# Patient Record
Sex: MALE | Race: BLACK OR AFRICAN AMERICAN | Employment: OTHER | URBAN - METROPOLITAN AREA
[De-identification: names, ages, dates, MRNs, and addresses within clinical notes are randomized per-mention and may not be internally consistent; named-entity substitution may affect disease eponyms.]

---

## 2019-09-06 DIAGNOSIS — D63.1 ANEMIA IN STAGE 4 CHRONIC KIDNEY DISEASE (HCC): ICD-10-CM

## 2019-09-06 DIAGNOSIS — N18.4 ANEMIA IN STAGE 4 CHRONIC KIDNEY DISEASE (HCC): ICD-10-CM

## 2019-09-06 DIAGNOSIS — N18.4 CHRONIC KIDNEY DISEASE, STAGE IV (SEVERE) (HCC): ICD-10-CM

## 2019-09-26 ENCOUNTER — HOSPITAL ENCOUNTER (OUTPATIENT)
Dept: INFUSION THERAPY | Age: 79
Setting detail: INFUSION SERIES
Discharge: HOME | End: 2019-09-26
Payer: MEDICARE

## 2019-09-26 VITALS
RESPIRATION RATE: 17 BRPM | OXYGEN SATURATION: 98 % | TEMPERATURE: 98.1 F | SYSTOLIC BLOOD PRESSURE: 168 MMHG | HEART RATE: 52 BPM | DIASTOLIC BLOOD PRESSURE: 62 MMHG

## 2019-09-26 DIAGNOSIS — D63.1 ANEMIA IN STAGE 4 CHRONIC KIDNEY DISEASE (HCC): Primary | ICD-10-CM

## 2019-09-26 DIAGNOSIS — N18.4 CHRONIC KIDNEY DISEASE, STAGE IV (SEVERE) (HCC): ICD-10-CM

## 2019-09-26 DIAGNOSIS — N18.4 ANEMIA IN STAGE 4 CHRONIC KIDNEY DISEASE (HCC): Primary | ICD-10-CM

## 2019-09-26 LAB
FERRITIN: 127.9 NG/ML (ref 30–400)
FOLATE: 11.01 NG/ML (ref 4.78–24.2)
HCT VFR BLD CALC: 21.9 % (ref 40.5–52.5)
HEMOGLOBIN: 7.1 G/DL (ref 13.5–17.5)
IRON SATURATION: 18 % (ref 20–50)
IRON: 56 UG/DL (ref 59–158)
MCH RBC QN AUTO: 32.2 PG (ref 26–34)
MCHC RBC AUTO-ENTMCNC: 32.6 G/DL (ref 31–36)
MCV RBC AUTO: 98.9 FL (ref 80–100)
PDW BLD-RTO: 15.3 % (ref 12.4–15.4)
PLATELET # BLD: 197 K/UL (ref 135–450)
PMV BLD AUTO: 8.1 FL (ref 5–10.5)
RBC # BLD: 2.21 M/UL (ref 4.2–5.9)
TOTAL IRON BINDING CAPACITY: 303 UG/DL (ref 260–445)
VITAMIN B-12: 353 PG/ML (ref 211–911)
WBC # BLD: 5.5 K/UL (ref 4–11)

## 2019-09-26 PROCEDURE — 6360000002 HC RX W HCPCS: Performed by: INTERNAL MEDICINE

## 2019-09-26 PROCEDURE — 83550 IRON BINDING TEST: CPT

## 2019-09-26 PROCEDURE — 96372 THER/PROPH/DIAG INJ SC/IM: CPT

## 2019-09-26 PROCEDURE — 83540 ASSAY OF IRON: CPT

## 2019-09-26 PROCEDURE — 82728 ASSAY OF FERRITIN: CPT

## 2019-09-26 PROCEDURE — 82746 ASSAY OF FOLIC ACID SERUM: CPT

## 2019-09-26 PROCEDURE — 82607 VITAMIN B-12: CPT

## 2019-09-26 PROCEDURE — 85027 COMPLETE CBC AUTOMATED: CPT

## 2019-09-26 RX ORDER — LOSARTAN POTASSIUM 50 MG/1
100 TABLET ORAL DAILY
COMMUNITY
End: 2019-12-20

## 2019-09-26 RX ORDER — AMLODIPINE BESYLATE 10 MG/1
10 TABLET ORAL EVERY EVENING
COMMUNITY
End: 2019-11-08

## 2019-09-26 RX ORDER — GLIPIZIDE 5 MG/1
5 TABLET ORAL
COMMUNITY
End: 2021-08-27

## 2019-09-26 RX ORDER — ALLOPURINOL 100 MG/1
100 TABLET ORAL DAILY
Status: ON HOLD | COMMUNITY
End: 2022-05-10 | Stop reason: HOSPADM

## 2019-09-26 RX ORDER — TAMSULOSIN HYDROCHLORIDE 0.4 MG/1
0.4 CAPSULE ORAL NIGHTLY
Status: ON HOLD | COMMUNITY
End: 2022-05-10 | Stop reason: HOSPADM

## 2019-09-26 RX ORDER — VITAMIN B COMPLEX
1 CAPSULE ORAL DAILY
COMMUNITY
End: 2019-11-08

## 2019-09-26 RX ADMIN — ERYTHROPOIETIN 20000 UNITS: 20000 INJECTION, SOLUTION INTRAVENOUS; SUBCUTANEOUS at 15:14

## 2019-09-26 SDOH — HEALTH STABILITY: MENTAL HEALTH: HOW OFTEN DO YOU HAVE A DRINK CONTAINING ALCOHOL?: 2-4 TIMES A MONTH

## 2019-09-26 NOTE — PROGRESS NOTES
Outpatient [address] [facility]    Peripheral Lab Draw    NAME:  Leeann Broussard  YOB: 1940  MEDICAL RECORD NUMBER:  [id number]  Episode Date:  9/26/2019    Blood Draw Site: Location:right arm  Site cleansed with Chloroprep Scrub for 30 seconds: Yes  Site cleansed with Alcohol pads: No:   Labs drawn with: 23 gauge             [x] Butterfly    [] Needle  Labs Obtained: Yes  Number of attempts: 1    Lab Test(s) Ordered: all of the initial labs incl. TIBC plus CBC were completed. Lab Draw Site:  Redness: No  Bruising: No   Edema: No  Pain: No     Response to treatment:  Well tolerated by patient.       Electronically signed by Jarett Vu RN on 9/26/2019 at 6:45 PM

## 2019-09-26 NOTE — PROGRESS NOTES
Outpatient [address] [facility]     Epogen Visit    NAME:  Fernando Ormond OF BIRTH:  1940  MEDICAL RECORD NUMBER:  [id number]  Episode Date:  9/26/2019            Pt here for the very first injection of procrit 20,000 units per Dr. Anaya Tolbert orders. Side effect sheet from [facility] printed and reviewed before the first injection was given. Here alone today. B/P elevated x 2 then it came down to 168/62. A lot of teaching done so pt is aware that he definetly needs that med before he comes to this appt. He said that his glucose this am was 148. Past history of DM for 28 yrs. history of prostate cancer and gets Lupron injections for that in [address] [place] [address] at a Urology group. The last Lupron was given to him 3 days ago. Patient arrived to [facility]   [] per wheelchair   [x] ambulatory     Is the patient experiencing any:  Fatigue:   []   None  []   Increase over baseline but not altering normal activities  []   Moderate of causing difficulty performing some activities  [x]   Severe or loss of ability to perform some activities  []   Bedridden or disabling    Dizziness or Lightheadedness:   [x]   None  []   No Interference  []   Interferes with functioning but not activities of daily living  []   Interferes with daily activies  []   Bedridden or disabling    Shortness of Breath:   []   None   [x]   Dyspneic on exertion   []   Dyspnea with normal activities  []   Dyspnea at rest    Edema: none    Tachycardia: NO    Heart Palpitations:  NO      Chest Pain: NO     BP (!) 172/55   Pulse 52   Temp 98.1 °F (36.7 °C) (Oral)   Resp 17   SpO2 98%     Hold ARANZA dose if B/P is greater than: 180 mm/Hg / took med before coming in for appt. But it still had to be checked x 3 before it was < 080 systolic. If Hgb remains less than 10 Increase dose by 25%. Do not increase dose more frequently than once every 4 weeks.   If Hgb 10-10.5 g/dl  Continue same dose  If Hgb 10.6-11 g/dl Decrease dose by 25%. A decrease in dose can be done as frequently as every week. If Hgb is greater than 11 g/dl Hold Epogen. May resume ARANZA when Hgb is less than 10 with a 25% reduction in the dose from the last administered dose or decrease with a 25% reduction in the dose from the last administered dose or decrease  frequency. Last visit date:  Not applicable      Current Lab Data:    Recent Labs     09/26/19  1420   WBC 5.5   HGB 7.1*   HCT 21.9*   MCV 98.9        Dose will be given as ordered at 20,000 units per MD orders. Epogen dosage: 20,000 units was administered slowly subcutaneously into the RIGHT upper arm. Dose verified by: Severiano Tony RN    Response to treatment:  Well tolerated by patient. Education:    Needs reinforcement    Scheduled to return for next dose of Epogen on October 10, 2019 .   No c/o's of pain with this injection of EPO. today   Electronically signed by Katie Lira RN on 9/26/2019 at 6:41 PM

## 2019-10-03 ENCOUNTER — APPOINTMENT (OUTPATIENT)
Dept: INFUSION THERAPY | Age: 79
End: 2019-10-03
Payer: MEDICARE

## 2019-10-10 ENCOUNTER — HOSPITAL ENCOUNTER (OUTPATIENT)
Dept: INFUSION THERAPY | Age: 79
Setting detail: INFUSION SERIES
Discharge: HOME | End: 2019-10-10
Payer: MEDICARE

## 2019-10-10 VITALS
HEART RATE: 77 BPM | RESPIRATION RATE: 18 BRPM | TEMPERATURE: 97.7 F | SYSTOLIC BLOOD PRESSURE: 143 MMHG | OXYGEN SATURATION: 94 % | DIASTOLIC BLOOD PRESSURE: 65 MMHG

## 2019-10-10 DIAGNOSIS — N18.4 CHRONIC KIDNEY DISEASE, STAGE IV (SEVERE) (HCC): ICD-10-CM

## 2019-10-10 DIAGNOSIS — N18.4 ANEMIA IN STAGE 4 CHRONIC KIDNEY DISEASE (HCC): Primary | ICD-10-CM

## 2019-10-10 DIAGNOSIS — D63.1 ANEMIA IN STAGE 4 CHRONIC KIDNEY DISEASE (HCC): Primary | ICD-10-CM

## 2019-10-10 LAB — HEMOGLOBIN: 8 G/DL (ref 13.5–17.5)

## 2019-10-10 PROCEDURE — 96372 THER/PROPH/DIAG INJ SC/IM: CPT

## 2019-10-10 PROCEDURE — 85018 HEMOGLOBIN: CPT

## 2019-10-10 PROCEDURE — 6360000002 HC RX W HCPCS: Performed by: INTERNAL MEDICINE

## 2019-10-10 RX ORDER — SODIUM CHLORIDE 0.9 % (FLUSH) 0.9 %
10 SYRINGE (ML) INJECTION PRN
Status: CANCELLED | OUTPATIENT
Start: 2019-10-24

## 2019-10-10 RX ORDER — METHYLPREDNISOLONE SODIUM SUCCINATE 125 MG/2ML
125 INJECTION, POWDER, LYOPHILIZED, FOR SOLUTION INTRAMUSCULAR; INTRAVENOUS ONCE
Status: CANCELLED | OUTPATIENT
Start: 2019-10-24

## 2019-10-10 RX ORDER — DIPHENHYDRAMINE HYDROCHLORIDE 50 MG/ML
50 INJECTION INTRAMUSCULAR; INTRAVENOUS ONCE
Status: CANCELLED | OUTPATIENT
Start: 2019-10-24

## 2019-10-10 RX ORDER — EPINEPHRINE 1 MG/ML
0.3 INJECTION, SOLUTION, CONCENTRATE INTRAVENOUS PRN
Status: CANCELLED | OUTPATIENT
Start: 2019-10-24

## 2019-10-10 RX ORDER — MEPERIDINE HYDROCHLORIDE 25 MG/ML
12.5 INJECTION INTRAMUSCULAR; INTRAVENOUS; SUBCUTANEOUS ONCE
Status: CANCELLED | OUTPATIENT
Start: 2019-10-24

## 2019-10-10 RX ORDER — SODIUM CHLORIDE 9 MG/ML
INJECTION, SOLUTION INTRAVENOUS CONTINUOUS
Status: CANCELLED | OUTPATIENT
Start: 2019-10-24

## 2019-10-10 RX ADMIN — EPOETIN ALFA-EPBX 20000 UNITS: 10000 INJECTION, SOLUTION INTRAVENOUS; SUBCUTANEOUS at 14:20

## 2019-10-10 NOTE — PROGRESS NOTES
Outpatient [address] [facility]    Peripheral Lab Draw    NAME:  Lilli Maxwell OF BIRTH:  1940  MEDICAL RECORD NUMBER:  [id number]  Episode Date:  10/10/2019    Blood Draw Site: Location:right arm  Site cleansed with Chloroprep Scrub for 30 seconds: Yes  Site cleansed with Alcohol pads: Yes  Labs drawn with: 23 gauge             [x] Butterfly    [] Needle  Labs Obtained: Yes  Number of attempts: 1    Lab Test(s) Ordered: HGB    Lab Draw Site:  Redness: No  Bruising: No   Edema: No  Pain: No     Response to treatment:  Well tolerated by patient. Electronically signed by Roxanne Zavala RN on 10/10/2019 at 1:50 PM      Outpatient [address] [facility]     Epogen Visit    NAME:  Lilli Maxwell OF BIRTH:  1940  MEDICAL RECORD NUMBER:  [id number]  Episode Date:  10/10/2019    Patient arrived to [facility]   [] per wheelchair   [x] ambulatory    Patient just saw Dr. Michael Jones couple of days ago. Kidney function has been worsening. He has 2 sisters with CKD and one of them is getting ready to have a transplant. Dr. Michael Jones told him to save his left arm. Iron levels a little low: iron sat was 18, iron level 56 and ferritin 127.9. Dr. Michael Jones has ordered Venofer 300 mg X 3 doses. Awaiting insurance verification. Explained to patient he would be getting iron infusion starting at next visit.      Is the patient experiencing any:  Fatigue:   []   None  [x]   Increase over baseline but not altering normal activities , states he tires easily  []   Moderate of causing difficulty performing some activities  []   Severe or loss of ability to perform some activities  []   Bedridden or disabling    Dizziness or Lightheadedness: Patient states he had some dizziness last week when he was taking Labatolol so med was stopped and he  has not had any dizziness since then.  []   None  [x]   No Interference  []   Interferes with functioning but not activities of daily living  []   Interferes with daily activies  []   Bedridden or disabling    Shortness of Breath: Denies any  [x]   None   []   Dyspneic on exertion   []   Dyspnea with normal activities  []   Dyspnea at rest    Edema: none Location of edema: nothing    Tachycardia: No    Heart Palpitations: No      Chest Pain: No     BP (!) 143/65   Pulse 77   Temp 97.7 °F (36.5 °C) (Oral)   Resp 18   SpO2 94%     Hold ARANZA dose if B/P is greater than: 180 mm/Hg     If Hgb remains less than 10 Increase dose by 25%. Do not increase dose more frequently than once every 4 weeks. If Hgb 10-10.5 g/dl  Continue same dose  If Hgb 10.6-11 g/dl Decrease dose by 25%. A decrease in dose can be done as frequently as every week. If Hgb is greater than 11 g/dl Hold Epogen. May resume ARANZA when Hgb is less than 10 with a 25% reduction in the dose from the last administered dose or decrease with a 25% reduction in the dose from the last administered dose or decrease  frequency. Last visit date: 9/26/19  Last Hgb: 7.1 g/dl   Last dose: 20,000 units     Current Lab Data:    Recent Labs     10/10/19  1325   HGB 8.0*     Dose will be the same    Epogen dosage: 20,000 units was administered slowly subcutaneously into the right upper arm. Dose verified by: Buster Paul RN    Response to treatment:  Well tolerated by patient. Education:    Indicates understanding    Scheduled to return for next dose of Epogen on October 24, 2019 and he will also get Venofer 300 mg at that time.      Electronically signed by Rey Little RN on 10/10/2019 at 6:30 pm.

## 2019-10-24 ENCOUNTER — HOSPITAL ENCOUNTER (OUTPATIENT)
Dept: INFUSION THERAPY | Age: 79
Setting detail: INFUSION SERIES
Discharge: HOME | End: 2019-10-24
Payer: MEDICARE

## 2019-10-24 VITALS
TEMPERATURE: 98.2 F | RESPIRATION RATE: 18 BRPM | DIASTOLIC BLOOD PRESSURE: 70 MMHG | HEART RATE: 58 BPM | SYSTOLIC BLOOD PRESSURE: 142 MMHG | OXYGEN SATURATION: 96 %

## 2019-10-24 DIAGNOSIS — N18.4 ANEMIA IN STAGE 4 CHRONIC KIDNEY DISEASE (HCC): Primary | ICD-10-CM

## 2019-10-24 DIAGNOSIS — D63.1 ANEMIA IN STAGE 4 CHRONIC KIDNEY DISEASE (HCC): Primary | ICD-10-CM

## 2019-10-24 DIAGNOSIS — N18.4 CHRONIC KIDNEY DISEASE, STAGE IV (SEVERE) (HCC): ICD-10-CM

## 2019-10-24 LAB — HEMOGLOBIN: 8.6 G/DL (ref 13.5–17.5)

## 2019-10-24 PROCEDURE — 96366 THER/PROPH/DIAG IV INF ADDON: CPT

## 2019-10-24 PROCEDURE — 96365 THER/PROPH/DIAG IV INF INIT: CPT

## 2019-10-24 PROCEDURE — 96372 THER/PROPH/DIAG INJ SC/IM: CPT

## 2019-10-24 PROCEDURE — 2580000003 HC RX 258: Performed by: INTERNAL MEDICINE

## 2019-10-24 PROCEDURE — 85018 HEMOGLOBIN: CPT

## 2019-10-24 PROCEDURE — 6360000002 HC RX W HCPCS: Performed by: INTERNAL MEDICINE

## 2019-10-24 RX ORDER — SODIUM CHLORIDE 9 MG/ML
INJECTION, SOLUTION INTRAVENOUS CONTINUOUS
Status: CANCELLED | OUTPATIENT
Start: 2019-11-07

## 2019-10-24 RX ORDER — SODIUM CHLORIDE 0.9 % (FLUSH) 0.9 %
10 SYRINGE (ML) INJECTION PRN
Status: CANCELLED | OUTPATIENT
Start: 2019-11-07

## 2019-10-24 RX ORDER — SODIUM CHLORIDE 0.9 % (FLUSH) 0.9 %
10 SYRINGE (ML) INJECTION PRN
Status: DISCONTINUED | OUTPATIENT
Start: 2019-10-24 | End: 2019-10-25 | Stop reason: HOSPADM

## 2019-10-24 RX ORDER — MEPERIDINE HYDROCHLORIDE 25 MG/ML
12.5 INJECTION INTRAMUSCULAR; INTRAVENOUS; SUBCUTANEOUS ONCE
Status: CANCELLED | OUTPATIENT
Start: 2019-11-07

## 2019-10-24 RX ORDER — EPINEPHRINE 1 MG/ML
0.3 INJECTION, SOLUTION, CONCENTRATE INTRAVENOUS PRN
Status: CANCELLED | OUTPATIENT
Start: 2019-11-07

## 2019-10-24 RX ORDER — ERGOCALCIFEROL (VITAMIN D2) 1250 MCG
50000 CAPSULE ORAL WEEKLY
COMMUNITY
End: 2020-06-19

## 2019-10-24 RX ORDER — DIPHENHYDRAMINE HYDROCHLORIDE 50 MG/ML
50 INJECTION INTRAMUSCULAR; INTRAVENOUS ONCE
Status: CANCELLED | OUTPATIENT
Start: 2019-11-07

## 2019-10-24 RX ORDER — METHYLPREDNISOLONE SODIUM SUCCINATE 125 MG/2ML
125 INJECTION, POWDER, LYOPHILIZED, FOR SOLUTION INTRAMUSCULAR; INTRAVENOUS ONCE
Status: CANCELLED | OUTPATIENT
Start: 2019-11-07

## 2019-10-24 RX ADMIN — IRON SUCROSE 300 MG: 20 INJECTION, SOLUTION INTRAVENOUS at 14:00

## 2019-10-24 RX ADMIN — Medication 10 ML: at 14:00

## 2019-10-24 RX ADMIN — Medication 10 ML: at 15:48

## 2019-10-24 RX ADMIN — Medication 10 ML: at 15:56

## 2019-10-24 RX ADMIN — EPOETIN ALFA-EPBX 25000 UNITS: 40000 INJECTION, SOLUTION INTRAVENOUS; SUBCUTANEOUS at 16:05

## 2019-10-24 ASSESSMENT — PAIN SCALES - GENERAL
PAINLEVEL_OUTOF10: 0

## 2019-10-24 NOTE — PROGRESS NOTES
[address] [facility] with lab draw and Venofer Visit     NAME:  Ermias Miller OF BIRTH:  1940  MEDICAL RECORD NUMBER:  [id number]  Episode Date:  10/24/2019    Patient arrived to [place] [address]   [] per wheelchair   [x] ambulatory     Peripheral Lab Draw    Blood Draw Site: Location: right anterior lateral proximal forearm  Site cleansed with Chloroprep Scrub for 30 seconds: Yes  Labs drawn with: 22 gauge angiocath with IV start       [] Butterfly    [] Needle  Labs Obtained: Yes  Number of attempts: 1    Lab Test(s) Ordered: STAT Hemoglobin    Lab Draw Site:  Redness: No  Bruising: No   Edema: No  Pain: No     Response to treatment:  Well tolerated by patient. Venofer Visit     Has the patient had a previous problem with Venofer Infusion? No  Any current infection or illness? No   Patient on antibiotics? No   History of Hypertension? Yes - took antihypertensive medications as prescribed     BP (!) 142/70   Pulse 58   Temp 98.2 °F (36.8 °C) (Oral)   Resp 18   SpO2 96%   No data found. Is the patient experiencing any:  Fatigue:   []   None  [x]   Increase over baseline but not altering normal activities - Patient reports that he gets tired more easily and needs to take rest/naps during the day.   []   Moderate of causing difficulty performing some activities  []   Severe or loss of ability to perform some activities  []   Bedridden or disabling     Dizziness or Lightheadedness:   []   None  [x]   No Interference - only with quick position changes and states that he knows to stand up slowly  []   Interferes with functioning but not activities of daily living  []   Interferes with daily activies  []   Bedridden or disabling    Shortness of Breath:   [x]   None   []   Dyspneic on exertion   []   Dyspnea with normal activities  []   Dyspnea at rest    Edema: Trace amount of edema noted in bilateral ankles     Tachycardia: No    Heart by:  Thania Albrecht RN    Response to treatment:  Well tolerated by patient. Education:    Verbalized understanding. Verbal and written discharge instructions reviewed with patient and he verbalized understanding. Written copy given via AVS. Requested for patient to bring copy of Advance Directives and pill bottles to next appointment    Scheduled to return for next dose of Epogen on November 8, 2019 .      Electronically signed by Vargas Teixeira RN on 10/24/2019 at 6:47 PM

## 2019-11-08 ENCOUNTER — HOSPITAL ENCOUNTER (OUTPATIENT)
Dept: INFUSION THERAPY | Age: 79
Setting detail: INFUSION SERIES
Discharge: HOME | End: 2019-11-08
Payer: MEDICARE

## 2019-11-08 VITALS
OXYGEN SATURATION: 98 % | RESPIRATION RATE: 17 BRPM | SYSTOLIC BLOOD PRESSURE: 116 MMHG | DIASTOLIC BLOOD PRESSURE: 73 MMHG | HEART RATE: 81 BPM | TEMPERATURE: 97.9 F

## 2019-11-08 DIAGNOSIS — D63.1 ANEMIA IN STAGE 4 CHRONIC KIDNEY DISEASE (HCC): Primary | ICD-10-CM

## 2019-11-08 DIAGNOSIS — N18.4 CHRONIC KIDNEY DISEASE, STAGE IV (SEVERE) (HCC): ICD-10-CM

## 2019-11-08 DIAGNOSIS — N18.4 ANEMIA IN STAGE 4 CHRONIC KIDNEY DISEASE (HCC): Primary | ICD-10-CM

## 2019-11-08 LAB — HEMOGLOBIN: 8.6 G/DL (ref 13.5–17.5)

## 2019-11-08 PROCEDURE — 6360000002 HC RX W HCPCS: Performed by: INTERNAL MEDICINE

## 2019-11-08 PROCEDURE — 2580000003 HC RX 258: Performed by: INTERNAL MEDICINE

## 2019-11-08 PROCEDURE — 85018 HEMOGLOBIN: CPT

## 2019-11-08 PROCEDURE — 96372 THER/PROPH/DIAG INJ SC/IM: CPT

## 2019-11-08 PROCEDURE — 96365 THER/PROPH/DIAG IV INF INIT: CPT

## 2019-11-08 PROCEDURE — 96366 THER/PROPH/DIAG IV INF ADDON: CPT

## 2019-11-08 RX ORDER — FUROSEMIDE 40 MG/1
80 TABLET ORAL 2 TIMES DAILY
Status: ON HOLD | COMMUNITY
End: 2022-05-10 | Stop reason: HOSPADM

## 2019-11-08 RX ORDER — METHYLPREDNISOLONE SODIUM SUCCINATE 125 MG/2ML
125 INJECTION, POWDER, LYOPHILIZED, FOR SOLUTION INTRAMUSCULAR; INTRAVENOUS ONCE
Status: CANCELLED | OUTPATIENT
Start: 2019-11-21

## 2019-11-08 RX ORDER — SODIUM CHLORIDE 0.9 % (FLUSH) 0.9 %
10 SYRINGE (ML) INJECTION PRN
Status: DISCONTINUED | OUTPATIENT
Start: 2019-11-08 | End: 2019-11-09 | Stop reason: HOSPADM

## 2019-11-08 RX ORDER — DIPHENHYDRAMINE HYDROCHLORIDE 50 MG/ML
50 INJECTION INTRAMUSCULAR; INTRAVENOUS ONCE
Status: CANCELLED | OUTPATIENT
Start: 2019-11-21

## 2019-11-08 RX ORDER — EPINEPHRINE 1 MG/ML
0.3 INJECTION, SOLUTION, CONCENTRATE INTRAVENOUS PRN
Status: CANCELLED | OUTPATIENT
Start: 2019-11-21

## 2019-11-08 RX ORDER — HYDRALAZINE HYDROCHLORIDE 50 MG/1
25 TABLET, FILM COATED ORAL 2 TIMES DAILY
Status: ON HOLD | COMMUNITY
End: 2022-04-21 | Stop reason: HOSPADM

## 2019-11-08 RX ORDER — SODIUM CHLORIDE 0.9 % (FLUSH) 0.9 %
10 SYRINGE (ML) INJECTION PRN
Status: CANCELLED | OUTPATIENT
Start: 2019-11-21

## 2019-11-08 RX ORDER — MEPERIDINE HYDROCHLORIDE 25 MG/ML
12.5 INJECTION INTRAMUSCULAR; INTRAVENOUS; SUBCUTANEOUS ONCE
Status: CANCELLED | OUTPATIENT
Start: 2019-11-21

## 2019-11-08 RX ORDER — SODIUM CHLORIDE 9 MG/ML
INJECTION, SOLUTION INTRAVENOUS CONTINUOUS
Status: CANCELLED | OUTPATIENT
Start: 2019-11-21

## 2019-11-08 RX ORDER — NIFEDIPINE 60 MG/1
60 TABLET, EXTENDED RELEASE ORAL 2 TIMES DAILY
Status: ON HOLD | COMMUNITY
End: 2022-04-21 | Stop reason: HOSPADM

## 2019-11-08 RX ADMIN — IRON SUCROSE 300 MG: 20 INJECTION, SOLUTION INTRAVENOUS at 13:00

## 2019-11-08 RX ADMIN — EPOETIN ALFA-EPBX 25000 UNITS: 10000 INJECTION, SOLUTION INTRAVENOUS; SUBCUTANEOUS at 13:46

## 2019-11-08 RX ADMIN — Medication 10 ML: at 15:06

## 2019-11-08 ASSESSMENT — PAIN SCALES - GENERAL: PAINLEVEL_OUTOF10: 0

## 2019-11-08 NOTE — PROGRESS NOTES
[address] [facility]     Epogen Visit with Peripheral Lab Draw    NAME:  Kathy Gary OF BIRTH:  1940  MEDICAL RECORD NUMBER:  [id number]  Episode Date:  11/8/2019    Patient arrived to [facility]   [] per wheelchair   [x] ambulatory    Peripheral Lab Draw    Blood Draw Site: Location:right arm  Site cleansed with Chloroprep Scrub for 30 seconds: Yes  Site cleansed with Alcohol pads: Yes  Labs drawn with: 22 gauge             [] Butterfly    [x] Needle  Labs Obtained: Yes  Number of attempts: 2    Lab Test(s) Ordered: HGB    Lab Draw Site:  Redness: No  Bruising: No   Edema: No  Pain: No       Epogen Visit     Is the patient experiencing any:  Fatigue:   []   None  [x]   Increase over baseline but not altering normal activities  []   Moderate of causing difficulty performing some activities  []   Severe or loss of ability to perform some activities  []   Bedridden or disabling    Dizziness or Lightheadedness:   [x]   None  []   No Interference  []   Interferes with functioning but not activities of daily living  []   Interferes with daily activies  []   Bedridden or disabling    Shortness of Breath:   []   None   [x]   Dyspneic on exertion only with steps  []   Dyspnea with normal activities  []   Dyspnea at rest    Edema: none Location of edema: Tachycardia: No    Heart Palpitations: No      Chest Pain: No     BP (!) 150/67   Pulse 96   Temp 97.9 °F (36.6 °C) (Oral)   Resp 17   SpO2 98%     Hold ARANZA dose if B/P is greater than: 180 mm/Hg     If Hgb remains less than 10 Increase dose by 25%. Do not increase dose more frequently than once every 4 weeks. If Hgb 10-10.5 g/dl  Continue same dose  If Hgb 10.6-11 g/dl Decrease dose by 25%. A decrease in dose can be done as frequently as every week. If Hgb is greater than 11 g/dl Hold Epogen.   May resume ARANZA when Hgb is less than 10 with a 25% reduction in the dose from the last administered dose or decrease with a 25% reduction in the dose from the last administered dose or decrease  frequency. Last visit date: 10/24/19     Last Hgb: 8.6 g/dl   Last dose: 64778 units    Current Lab Data:    Recent Labs     11/08/19  1255   HGB 8.6*     Dose will be the same    Epogen dosage: 43408 units was administered slowly subcutaneously into the right upper arm. Dose verified by:  Addie Martinez RN    Response to treatment:  Well tolerated by patient. Education:    Verbalized understanding    Scheduled to return for next dose of Epogen on November 22, 2019 . Electronically signed by Alejandro Samuels RN on 11/8/2019 at [address] [place] [address] Visit    NAME:  Erick Wu OF BIRTH:  1940  MEDICAL RECORD NUMBER:  [id number]  Episode Date:  11/8/2019    Patient arrived to [facility] 58   [] per wheelchair   [x] ambulatory     Has the patient had a previous problem with Venofer Infusion? No  Any current infection or illness? No   Patient on antibiotics? No   History of Hypertension?  Yes      BP (!) 150/67   Pulse 96   Temp 97.9 °F (36.6 °C) (Oral)   Resp 17   SpO2 98%   Patient Vitals for the past 2 hrs:   BP Temp Temp src Pulse Resp SpO2   11/08/19 1245 (!) 150/67 97.9 °F (36.6 °C) Oral 96 17 98 %       Is the patient experiencing any:  Fatigue:   []   None  [x]   Increase over baseline but not altering normal activities  []   Moderate of causing difficulty performing some activities  []   Severe or loss of ability to perform some activities  []   Bedridden or disabling     Dizziness or Lightheadedness:   [x]   None  []   No Interference  []   Interferes with functioning but not activities of daily living  []   Interferes with daily activies  []   Bedridden or disabling    Shortness of Breath:   []   None   [x]   Dyspneic on exertion only with steps  []   Dyspnea with normal activities  []   Dyspnea at rest    Edema: none     Tachycardia: No    Heart Palpitations: No      Chest Pain: No      BP (!) 150/67   Pulse 96   Temp 97.9 °F (36.6 °C) (Oral)   Resp 17   SpO2 98%   Patient Vitals for the past 2 hrs:   BP Temp Temp src Pulse Resp SpO2   11/08/19 1245 (!) 150/67 97.9 °F (36.6 °C) Oral 96 17 98 %       Current Lab Data:  Hemoglobin/Hematocrit:    Lab Results   Component Value Date    HGB 8.6 11/08/2019    HCT 21.9 09/26/2019     IRON:    Lab Results   Component Value Date    IRON 56 09/26/2019     Iron Saturation:    Lab Results   Component Value Date    LABIRON 18 09/26/2019     TIBC:    Lab Results   Component Value Date    TIBC 303 09/26/2019     FERRITIN:    Lab Results   Component Value Date    FERRITIN 127.9 09/26/2019       Dose Administered: 300 mg  Todays dose is number 2 out of 5 ordered for this patient. Response to treatment:  Well tolerated by patient. Education:    Verbalized understanding     Scheduled to return for next dose of Venofer on November 22, 2019.      Electronically signed by Denny Lawson RN on 11/8/2019 at 1:52 PM

## 2019-11-22 ENCOUNTER — HOSPITAL ENCOUNTER (OUTPATIENT)
Dept: INFUSION THERAPY | Age: 79
Setting detail: INFUSION SERIES
Discharge: HOME | End: 2019-11-22
Payer: MEDICARE

## 2019-11-22 VITALS
RESPIRATION RATE: 16 BRPM | OXYGEN SATURATION: 98 % | TEMPERATURE: 97.8 F | DIASTOLIC BLOOD PRESSURE: 73 MMHG | SYSTOLIC BLOOD PRESSURE: 161 MMHG | HEART RATE: 85 BPM

## 2019-11-22 DIAGNOSIS — N18.4 CHRONIC KIDNEY DISEASE, STAGE IV (SEVERE) (HCC): ICD-10-CM

## 2019-11-22 DIAGNOSIS — D63.1 ANEMIA IN STAGE 4 CHRONIC KIDNEY DISEASE (HCC): Primary | ICD-10-CM

## 2019-11-22 DIAGNOSIS — N18.4 ANEMIA IN STAGE 4 CHRONIC KIDNEY DISEASE (HCC): Primary | ICD-10-CM

## 2019-11-22 LAB — HEMOGLOBIN: 9.8 G/DL (ref 13.5–17.5)

## 2019-11-22 PROCEDURE — 2580000003 HC RX 258: Performed by: INTERNAL MEDICINE

## 2019-11-22 PROCEDURE — 85018 HEMOGLOBIN: CPT

## 2019-11-22 PROCEDURE — 96365 THER/PROPH/DIAG IV INF INIT: CPT

## 2019-11-22 PROCEDURE — 6360000002 HC RX W HCPCS: Performed by: INTERNAL MEDICINE

## 2019-11-22 PROCEDURE — 96372 THER/PROPH/DIAG INJ SC/IM: CPT

## 2019-11-22 PROCEDURE — 96366 THER/PROPH/DIAG IV INF ADDON: CPT

## 2019-11-22 RX ORDER — SODIUM CHLORIDE 0.9 % (FLUSH) 0.9 %
10 SYRINGE (ML) INJECTION PRN
Status: CANCELLED | OUTPATIENT
Start: 2019-11-22

## 2019-11-22 RX ORDER — SODIUM CHLORIDE 0.9 % (FLUSH) 0.9 %
10 SYRINGE (ML) INJECTION PRN
Status: DISCONTINUED | OUTPATIENT
Start: 2019-11-22 | End: 2019-11-23 | Stop reason: HOSPADM

## 2019-11-22 RX ORDER — METHYLPREDNISOLONE SODIUM SUCCINATE 125 MG/2ML
125 INJECTION, POWDER, LYOPHILIZED, FOR SOLUTION INTRAMUSCULAR; INTRAVENOUS ONCE
Status: CANCELLED | OUTPATIENT
Start: 2019-11-22

## 2019-11-22 RX ORDER — MEPERIDINE HYDROCHLORIDE 25 MG/ML
12.5 INJECTION INTRAMUSCULAR; INTRAVENOUS; SUBCUTANEOUS ONCE
Status: CANCELLED | OUTPATIENT
Start: 2019-11-22

## 2019-11-22 RX ORDER — SODIUM CHLORIDE 9 MG/ML
INJECTION, SOLUTION INTRAVENOUS CONTINUOUS
Status: CANCELLED | OUTPATIENT
Start: 2019-11-22

## 2019-11-22 RX ORDER — EPINEPHRINE 1 MG/ML
0.3 INJECTION, SOLUTION, CONCENTRATE INTRAVENOUS PRN
Status: CANCELLED | OUTPATIENT
Start: 2019-11-22

## 2019-11-22 RX ORDER — DIPHENHYDRAMINE HYDROCHLORIDE 50 MG/ML
50 INJECTION INTRAMUSCULAR; INTRAVENOUS ONCE
Status: CANCELLED | OUTPATIENT
Start: 2019-11-22

## 2019-11-22 RX ADMIN — EPOETIN ALFA-EPBX 31000 UNITS: 40000 INJECTION, SOLUTION INTRAVENOUS; SUBCUTANEOUS at 14:32

## 2019-11-22 RX ADMIN — IRON SUCROSE 300 MG: 20 INJECTION, SOLUTION INTRAVENOUS at 13:11

## 2019-11-22 RX ADMIN — Medication 30 ML: at 12:50

## 2019-11-22 RX ADMIN — Medication 10 ML: at 13:08

## 2019-11-22 NOTE — PROGRESS NOTES
[address] [facility]     Epogen Visit with Peripheral Lab Draw    NAME:  Cristina Marquisor OF BIRTH:  1940  MEDICAL RECORD NUMBER:  [id number]  Episode Date:  11/22/2019    Patient arrived to [facility]   [] per wheelchair   [x] ambulatory    Alert and oriented X4, here today for lab draw, epogen (retacrit) injection and last dose of venofer. Denies any side effects from last venofer infusion    Peripheral Lab Draw    Blood Draw Site: Location:right arm  Site cleansed with Chloroprep Scrub for 30 seconds: Yes  Site cleansed with Alcohol pads: Yes  Labs drawn with: 22 gauge  Angiocath           [] Butterfly    [] Needle  Labs Obtained: Yes  Number of attempts: 1    Lab Test(s) Ordered: Stat HGB    Lab Draw Site:  Redness: No  Bruising: No   Edema: No  Pain: No       Epogen Visit     Is the patient experiencing any:  Fatigue:   []   None  [x]   Increase over baseline but not altering normal activities  []   Moderate of causing difficulty performing some activities  []   Severe or loss of ability to perform some activities  []   Bedridden or disabling    Dizziness or Lightheadedness:   [x]   None  []   No Interference  []   Interferes with functioning but not activities of daily living  []   Interferes with daily activies  []   Bedridden or disabling    Shortness of Breath:   [x]   None   []   Dyspneic on exertion   []   Dyspnea with normal activities  []   Dyspnea at rest    Edema: none Location of edema: nothing    Tachycardia: No    Heart Palpitations: No      Chest Pain: No     BP (!) 150/72   Pulse 83   Temp 97.8 °F (36.6 °C) (Oral)   Resp 16   SpO2 98%     Hold ARANZA dose if B/P is greater than: 180 mm/Hg     If Hgb remains less than 10 Increase dose by 25%. Do not increase dose more frequently than once every 4 weeks. If Hgb 10-10.5 g/dl  Continue same dose  If Hgb 10.6-11 g/dl Decrease dose by 25%.   A decrease in dose can be done as frequently as every week. If Hgb is greater than 11 g/dl Hold Epogen. May resume ARANZA when Hgb is less than 10 with a 25% reduction in the dose from the last administered dose or decrease with a 25% reduction in the dose from the last administered dose or decrease  frequency. Last visit date: 11/8/19     Last Hgb: 8.6 g/dl   Last dose: 25,000 units    Current Lab Data:    Recent Labs     11/22/19  1250   HGB 9.8*     Dose will be increased, last increase 4 weeks and 1 day ago    Epogen dosage: 83028 units was administered slowly subcutaneously into the right upper arm. Dose verified by: AUBREY Freitas RN    Response to treatment:  Well tolerated by patient. Education:    Verbalized understanding    Scheduled to return for next dose of Epogen on December 6, 2019 . Electronically signed by Alto Gilford, RN on 11/22/2019 at 1:23 PM     Venofer Visit    NAME:  Ramona Sawyer OF BIRTH:  1940  MEDICAL RECORD NUMBER:  [id number]  Episode Date:  11/22/2019      Has the patient had a previous problem with Venofer Infusion? No  Any current infection or illness? No   Patient on antibiotics? No   History of Hypertension?  No     BP (!) 150/72   Pulse 83   Temp 97.8 °F (36.6 °C) (Oral)   Resp 16   SpO2 98%   Patient Vitals for the past 2 hrs:   BP Temp Temp src Pulse Resp SpO2   11/22/19 1230 (!) 150/72 97.8 °F (36.6 °C) Oral 83 16 98 %       Is the patient experiencing any:  Fatigue:   []   None  [x]   Increase over baseline but not altering normal activities  []   Moderate of causing difficulty performing some activities  []   Severe or loss of ability to perform some activities  []   Bedridden or disabling     Dizziness or Lightheadedness:   [x]   None  []   No Interference  []   Interferes with functioning but not activities of daily living  []   Interferes with daily activies  []   Bedridden or disabling    Shortness of Breath:   [x]   None   []   Dyspneic on exertion   []   Dyspnea with normal activities  []   Dyspnea at rest    Edema: none Location of edema: nothing    Tachycardia: No    Heart Palpitations: No      Chest Pain: No      BP (!) 150/72   Pulse 83   Temp 97.8 °F (36.6 °C) (Oral)   Resp 16   SpO2 98%   Patient Vitals for the past 2 hrs:   BP Temp Temp src Pulse Resp SpO2   11/22/19 1230 (!) 150/72 97.8 °F (36.6 °C) Oral 83 16 98 %       Current Lab Data:  Hemoglobin/Hematocrit:    Lab Results   Component Value Date    HGB 9.8 11/22/2019    HCT 21.9 09/26/2019     IRON:    Lab Results   Component Value Date    IRON 56 09/26/2019     Iron Saturation:    Lab Results   Component Value Date    LABIRON 18 09/26/2019     TIBC:    Lab Results   Component Value Date    TIBC 303 09/26/2019     FERRITIN:    Lab Results   Component Value Date    FERRITIN 127.9 09/26/2019       Dose Administered: 300 mg  Todays dose is number 3 out of 3 ordered for this patient. Response to treatment:  Well tolerated by patient. Education:    Verbalized understanding     Scheduled to return for next dose of Venofer on . ., N/A, today was last dose.      Electronically signed by Genia Denson RN on 11/22/2019 at 1:23 PM

## 2019-12-06 ENCOUNTER — HOSPITAL ENCOUNTER (OUTPATIENT)
Dept: INFUSION THERAPY | Age: 79
Setting detail: INFUSION SERIES
Discharge: HOME | End: 2019-12-06
Payer: MEDICARE

## 2019-12-06 VITALS
HEART RATE: 81 BPM | SYSTOLIC BLOOD PRESSURE: 135 MMHG | RESPIRATION RATE: 16 BRPM | DIASTOLIC BLOOD PRESSURE: 73 MMHG | TEMPERATURE: 98 F | OXYGEN SATURATION: 97 %

## 2019-12-06 DIAGNOSIS — N18.4 CHRONIC KIDNEY DISEASE, STAGE IV (SEVERE) (HCC): ICD-10-CM

## 2019-12-06 DIAGNOSIS — D63.1 ANEMIA IN STAGE 4 CHRONIC KIDNEY DISEASE (HCC): Primary | ICD-10-CM

## 2019-12-06 DIAGNOSIS — N18.4 ANEMIA IN STAGE 4 CHRONIC KIDNEY DISEASE (HCC): Primary | ICD-10-CM

## 2019-12-06 LAB — HEMOGLOBIN: 10.4 G/DL (ref 13.5–17.5)

## 2019-12-06 PROCEDURE — 85018 HEMOGLOBIN: CPT

## 2019-12-06 PROCEDURE — 6360000002 HC RX W HCPCS

## 2019-12-06 PROCEDURE — 6360000002 HC RX W HCPCS: Performed by: INTERNAL MEDICINE

## 2019-12-06 PROCEDURE — 96372 THER/PROPH/DIAG INJ SC/IM: CPT

## 2019-12-06 RX ADMIN — EPOETIN ALFA-EPBX 31000 UNITS: 40000 INJECTION, SOLUTION INTRAVENOUS; SUBCUTANEOUS at 13:00

## 2019-12-06 NOTE — PROGRESS NOTES
[address] [facility]     Epogen Visit with Peripheral Lab Draw    NAME:  Cristina Marquisor OF BIRTH:  1940  MEDICAL RECORD NUMBER:  [id number]  Episode Date:  12/6/2019    Patient arrived to [facility] 58   [] per wheelchair   [x] ambulatory    Alert and oriented x 4. States felt less tired after receiving 3 doses of Venofer. Last dose on 11/22/19. States now feels he is back to where he was prior to infusions. Peripheral Lab Draw    Blood Draw Site: Location:right arm  Site cleansed with Chloroprep Scrub for 30 seconds: Yes  Site cleansed with Alcohol pads: Yes  Labs drawn with: 23 gauge             [x] Butterfly    [] Needle  Labs Obtained: Yes  Number of attempts: 1    Lab Test(s) Ordered: Stat HGB    Lab Draw Site:  Redness: No  Bruising: No   Edema: No  Pain: No       Epogen Visit     Is the patient experiencing any:  Fatigue:   []   None  [x]   Increase over baseline but not altering normal activities. States had improved some after receiving Venofer but now back to where it was before  []   Moderate of causing difficulty performing some activities  []   Severe or loss of ability to perform some activities  []   Bedridden or disabling    Dizziness or Lightheadedness:   [x]   None  []   No Interference  []   Interferes with functioning but not activities of daily living  []   Interferes with daily activies  []   Bedridden or disabling    Shortness of Breath:   []   None   [x]   Dyspneic on exertion, with stairs   []   Dyspnea with normal activities  []   Dyspnea at rest    Edema: none Location of edema: nothing    Tachycardia: No    Heart Palpitations: No      Chest Pain: No     /73   Pulse 81   Temp 98 °F (36.7 °C) (Oral)   Resp 16   SpO2 97%     Hold ARANZA dose if B/P is greater than: 180 mm/Hg     If Hgb remains less than 10 Increase dose by 25%. Do not increase dose more frequently than once every 4 weeks.   If Hgb 10-10.5 g/dl  Continue same dose  If Hgb 10.6-11 g/dl Decrease dose by 25%. A decrease in dose can be done as frequently as every week. If Hgb is greater than 11 g/dl Hold Epogen. May resume ARANZA when Hgb is less than 10 with a 25% reduction in the dose from the last administered dose or decrease with a 25% reduction in the dose from the last administered dose or decrease  frequency. Last visit date: 11/22/19    Last Hgb: 9.8 g/dl   Last dose: 31,000 units    Current Lab Data:    Recent Labs     12/06/19  1230   HGB 10.4*     Dose will remain the same per order    Epogen dosage: 34110 units was administered slowly subcutaneously into the right upper arm. Bandaid applied    Dose verified by: ANTONINA Humphries RN    Response to treatment:  Well tolerated by patient. Education:    Verbalized understanding    Scheduled to return for next dose of Epogen on December 20, 2019 .      Electronically signed by Hari Mack RN on 12/6/2019 at 12:51 PM

## 2019-12-20 ENCOUNTER — HOSPITAL ENCOUNTER (OUTPATIENT)
Dept: INFUSION THERAPY | Age: 79
Setting detail: INFUSION SERIES
Discharge: HOME | End: 2019-12-20
Payer: MEDICARE

## 2019-12-20 VITALS
HEART RATE: 95 BPM | SYSTOLIC BLOOD PRESSURE: 138 MMHG | OXYGEN SATURATION: 97 % | RESPIRATION RATE: 17 BRPM | DIASTOLIC BLOOD PRESSURE: 73 MMHG | TEMPERATURE: 97.6 F

## 2019-12-20 DIAGNOSIS — N18.4 ANEMIA IN STAGE 4 CHRONIC KIDNEY DISEASE (HCC): Primary | ICD-10-CM

## 2019-12-20 DIAGNOSIS — D63.1 ANEMIA IN STAGE 4 CHRONIC KIDNEY DISEASE (HCC): Primary | ICD-10-CM

## 2019-12-20 DIAGNOSIS — N18.4 CHRONIC KIDNEY DISEASE, STAGE IV (SEVERE) (HCC): ICD-10-CM

## 2019-12-20 LAB — HEMOGLOBIN: 10.4 G/DL (ref 13.5–17.5)

## 2019-12-20 PROCEDURE — 6360000002 HC RX W HCPCS: Performed by: INTERNAL MEDICINE

## 2019-12-20 PROCEDURE — 85018 HEMOGLOBIN: CPT

## 2019-12-20 PROCEDURE — 96372 THER/PROPH/DIAG INJ SC/IM: CPT

## 2019-12-20 RX ADMIN — EPOETIN ALFA-EPBX 31000 UNITS: 40000 INJECTION, SOLUTION INTRAVENOUS; SUBCUTANEOUS at 14:44

## 2019-12-20 NOTE — PROGRESS NOTES
[facility] [facility]      Retacrit  Visit with Peripheral Lab Draw    NAME:  Niharika Causey  YOB: 1940  MEDICAL RECORD NUMBER:  [id number]  Episode Date:  12/20/2019    Patient arrived to [facility] 58   [] per wheelchair   [x] ambulatory    Peripheral Lab Draw--done at 114pm     Blood Draw Site: Location:right arm, antecubital space   Site cleansed with Chloroprep Scrub for 30 seconds: Yes  Site cleansed with Alcohol pads: No:   Labs drawn with:  23  gauge             [x] Butterfly    [] Needle  Labs Obtained: Yes  Number of attempts: 1    Lab Test(s) Ordered: stat Hbg     Lab Draw Site:  Redness: No  Bruising: No   Edema: No  Pain: No   Pt tolerated well. DSD to site       Retacrit Visit   Color fair, . Pt is a light skinned Afro american, palms of hands are pink, and mucus membranes are pink ; Pt pleasant and cooperative, is sl. Forgetful, Pt checks his blood sugars daily and they are stable. Is the patient experiencing any:  Fatigue:  Tires easily in afternoon, but  energy level is fair per pt . Energy level is increasing.                                         []   None  [x]   Increase over baseline but not altering normal activities  []   Moderate of causing difficulty performing some activities  []   Severe or loss of ability to perform some activities  []   Bedridden or disabling    Dizziness or Lightheadedness:  Slight     []   None  []   No Interference  [x]   Interferes with functioning but not activities of daily living  []   Interferes with daily activies  []   Bedridden or disabling    Shortness of Breath:   []   None   [x]   Dyspneic on exertion going up and down steps , denies sob with walking on flat surfaces or at rest .   []   Dyspnea with normal activities  []   Dyspnea at rest      Tachycardia: No    Heart Palpitations: No      Chest Pain: No    Hold ARANZA dose if B/P is greater than:  180 mm/Hg     If Hgb remains less than 10 Increase dose by 25%. Do not increase dose more frequently than once every 4 weeks. If Hgb 10-10.5 g/dl  Continue same dose  If Hgb 10.6-11 g/dl Decrease dose by 25%. A decrease in dose can be done as frequently as every week. If Hgb is greater than 11 g/dl Hold Retacrit . May resume ARANZA when Hgb is less than 10 with a 25% reduction in the dose from the last administered dose or decrease with a 25% reduction in the dose from the last administered dose or decrease  frequency. Last visit date: 12/6/19   Last Hgb:  10.4 g/dl   Last dose:  31.000  Units of Retacrit     Had to wait approx one hour for Hemoglobin results due to computer downtime in lab. Current Lab Data:today Hemoglobin is 10.4    Dose will stay the same     Retacrit  dosage:  31,000  units was administered slowly subcutaneously into the right upper arm. DSD to site   Dose verified by: Paula Freitas RN    Response to treatment:  Well tolerated by patient. Education:  Re possible s.e. from  Retacrit and when to call MD   Indicates understanding    Scheduled to return for next dose of  Retacrit  January 6, 2020 . ( Friday)    Pt to see Dr. Yaquelin Lee in office on 1/3/2020 .    Electronically signed by Jaylen Taylor RN on 12/20/2019 at 1700

## 2020-01-06 ENCOUNTER — HOSPITAL ENCOUNTER (OUTPATIENT)
Dept: INFUSION THERAPY | Age: 80
Setting detail: INFUSION SERIES
Discharge: HOME | End: 2020-01-06
Payer: MEDICARE

## 2020-01-06 VITALS
OXYGEN SATURATION: 97 % | SYSTOLIC BLOOD PRESSURE: 127 MMHG | RESPIRATION RATE: 17 BRPM | HEART RATE: 85 BPM | TEMPERATURE: 97.5 F | DIASTOLIC BLOOD PRESSURE: 71 MMHG

## 2020-01-06 DIAGNOSIS — N18.4 CHRONIC KIDNEY DISEASE, STAGE IV (SEVERE) (HCC): ICD-10-CM

## 2020-01-06 DIAGNOSIS — N18.4 ANEMIA IN STAGE 4 CHRONIC KIDNEY DISEASE (HCC): Primary | ICD-10-CM

## 2020-01-06 DIAGNOSIS — D63.1 ANEMIA IN STAGE 4 CHRONIC KIDNEY DISEASE (HCC): Primary | ICD-10-CM

## 2020-01-06 LAB
FERRITIN: 123.7 NG/ML (ref 30–400)
HEMOGLOBIN: 10.8 G/DL (ref 13.5–17.5)
IRON SATURATION: 28 % (ref 20–50)
IRON: 82 UG/DL (ref 59–158)
TOTAL IRON BINDING CAPACITY: 293 UG/DL (ref 260–445)

## 2020-01-06 PROCEDURE — 6360000002 HC RX W HCPCS: Performed by: INTERNAL MEDICINE

## 2020-01-06 PROCEDURE — 82728 ASSAY OF FERRITIN: CPT

## 2020-01-06 PROCEDURE — 85018 HEMOGLOBIN: CPT

## 2020-01-06 PROCEDURE — 83550 IRON BINDING TEST: CPT

## 2020-01-06 PROCEDURE — 83540 ASSAY OF IRON: CPT

## 2020-01-06 PROCEDURE — 96372 THER/PROPH/DIAG INJ SC/IM: CPT

## 2020-01-06 RX ADMIN — EPOETIN ALFA-EPBX 23000 UNITS: 40000 INJECTION, SOLUTION INTRAVENOUS; SUBCUTANEOUS at 13:48

## 2020-01-06 ASSESSMENT — PAIN SCALES - GENERAL: PAINLEVEL_OUTOF10: 0

## 2020-01-20 ENCOUNTER — HOSPITAL ENCOUNTER (OUTPATIENT)
Dept: INFUSION THERAPY | Age: 80
Setting detail: INFUSION SERIES
Discharge: HOME | End: 2020-01-20
Payer: MEDICARE

## 2020-01-20 VITALS
DIASTOLIC BLOOD PRESSURE: 75 MMHG | HEART RATE: 86 BPM | TEMPERATURE: 97.4 F | SYSTOLIC BLOOD PRESSURE: 151 MMHG | RESPIRATION RATE: 16 BRPM | OXYGEN SATURATION: 96 %

## 2020-01-20 DIAGNOSIS — D63.1 ANEMIA IN STAGE 4 CHRONIC KIDNEY DISEASE (HCC): Primary | ICD-10-CM

## 2020-01-20 DIAGNOSIS — N18.4 CHRONIC KIDNEY DISEASE, STAGE IV (SEVERE) (HCC): ICD-10-CM

## 2020-01-20 DIAGNOSIS — N18.4 ANEMIA IN STAGE 4 CHRONIC KIDNEY DISEASE (HCC): Primary | ICD-10-CM

## 2020-01-20 LAB — HEMOGLOBIN: 11 G/DL (ref 13.5–17.5)

## 2020-01-20 PROCEDURE — 85018 HEMOGLOBIN: CPT

## 2020-01-20 PROCEDURE — 6360000002 HC RX W HCPCS: Performed by: INTERNAL MEDICINE

## 2020-01-20 PROCEDURE — 96372 THER/PROPH/DIAG INJ SC/IM: CPT

## 2020-01-20 RX ADMIN — EPOETIN ALFA-EPBX 17000 UNITS: 10000 INJECTION, SOLUTION INTRAVENOUS; SUBCUTANEOUS at 13:18

## 2020-01-20 NOTE — PROGRESS NOTES
[address]     Epogen Visit with Peripheral Lab Draw    NAME:  Surinder Nguyễn  YOB: 1940  MEDICAL RECORD NUMBER:  [id number]  Episode Date:  1/20/2020    Patient arrived to [facility] 58   [] per wheelchair   [x] ambulatory    Peripheral Lab Draw    Blood Draw Site: Location:right arm  Site cleansed with Chloroprep Scrub for 30 seconds: Yes  Site cleansed with Alcohol pads: Yes  Labs drawn with: 23 gauge             [] Butterfly    [x] Needle  Labs Obtained: Yes  Number of attempts: 1    Lab Test(s) Ordered: STAT HGB    Lab Draw Site:  Redness: No  Bruising: No   Edema: No  Pain: No       Epogen Visit     Is the patient experiencing any:  Fatigue:   []   None  [x]   Increase over baseline but not altering normal activities  []   Moderate of causing difficulty performing some activities  []   Severe or loss of ability to perform some activities  []   Bedridden or disabling    Dizziness or Lightheadedness:   [x]   None  []   No Interference  []   Interferes with functioning but not activities of daily living  []   Interferes with daily activies  []   Bedridden or disabling    Shortness of Breath:   []   None   [x]   Dyspneic on exertion   []   Dyspnea with normal activities  []   Dyspnea at rest    Edema: none Location of edema: nothing    Tachycardia: No    Heart Palpitations: No      Chest Pain: No     BP (!) 151/75   Pulse 86   Temp 97.4 °F (36.3 °C) (Oral)   Resp 16   SpO2 96%     Hold ARANZA dose if B/P is greater than: 180 mm/Hg     If Hgb remains less than 10 Increase dose by 25%. Do not increase dose more frequently than once every 4 weeks. If Hgb 10-10.5 g/dl  Continue same dose  If Hgb 10.6-11 g/dl Decrease dose by 25%. A decrease in dose can be done as frequently as every week. If Hgb is greater than 11 g/dl Hold Epogen.   May resume ARANZA when Hgb is less than 10 with a 25% reduction in the dose from the last administered dose or decrease with a 25% reduction in the dose from the last administered dose or decrease  frequency. Last visit date: 1/6/20     Last Hgb: 10.8 g/dl   Last dose: 23,000 units    Current Lab Data:    Recent Labs     01/20/20  1235   HGB 11.0*     Dose will be decreased by 25% per order    Epogen dosage: 38036 units was administered slowly subcutaneously into the right upper arm. Dose verified by: AUBREY Samuels RN    Response to treatment:  Well tolerated by patient. Education:    Verbalized understanding. Instructed re possibly next visit  med will be held if HGB 11.1 or greater, will still check labs as ordered    Scheduled to return for next dose of Epogen on February 3, 2020 .      Electronically signed by Hari Mack RN on 1/20/2020 at 1:00 PM

## 2020-02-03 ENCOUNTER — HOSPITAL ENCOUNTER (OUTPATIENT)
Dept: INFUSION THERAPY | Age: 80
Setting detail: INFUSION SERIES
Discharge: HOME | End: 2020-02-03
Payer: MEDICARE

## 2020-02-03 VITALS
TEMPERATURE: 98.4 F | OXYGEN SATURATION: 96 % | RESPIRATION RATE: 16 BRPM | SYSTOLIC BLOOD PRESSURE: 143 MMHG | DIASTOLIC BLOOD PRESSURE: 75 MMHG | HEART RATE: 89 BPM

## 2020-02-03 DIAGNOSIS — N18.4 CHRONIC KIDNEY DISEASE, STAGE IV (SEVERE) (HCC): ICD-10-CM

## 2020-02-03 DIAGNOSIS — D63.1 ANEMIA IN STAGE 4 CHRONIC KIDNEY DISEASE (HCC): Primary | ICD-10-CM

## 2020-02-03 DIAGNOSIS — N18.4 ANEMIA IN STAGE 4 CHRONIC KIDNEY DISEASE (HCC): Primary | ICD-10-CM

## 2020-02-03 LAB — HEMOGLOBIN: 10.7 G/DL (ref 13.5–17.5)

## 2020-02-03 PROCEDURE — 85018 HEMOGLOBIN: CPT

## 2020-02-03 PROCEDURE — 6360000002 HC RX W HCPCS: Performed by: INTERNAL MEDICINE

## 2020-02-03 PROCEDURE — 96372 THER/PROPH/DIAG INJ SC/IM: CPT

## 2020-02-03 RX ADMIN — EPOETIN ALFA-EPBX 10000 UNITS: 10000 INJECTION, SOLUTION INTRAVENOUS; SUBCUTANEOUS at 13:21

## 2020-02-03 RX ADMIN — EPOETIN ALFA-EPBX 2750 UNITS: 3000 INJECTION, SOLUTION INTRAVENOUS; SUBCUTANEOUS at 13:21

## 2020-02-03 NOTE — PROGRESS NOTES
[facility]     Epogen Visit with Peripheral Lab Draw    NAME:  Andrew Sumner OF BIRTH:  1940  MEDICAL RECORD NUMBER:  [id number]  Episode Date:  2/3/2020    Patient arrived to [facility] 58   [] per wheelchair   [x] ambulatory    Alert and oriented X 4. Denies any new s/s. States feeling stronger with higher HGB results. Peripheral Lab Draw    Blood Draw Site: Location:right antecubital  Site cleansed with Chloroprep Scrub for 30 seconds: Yes  Site cleansed with Alcohol pads: Yes  Labs drawn with: 23 gauge             [x] Butterfly    [] Needle  Labs Obtained: Yes  Number of attempts: 1    Lab Test(s) Ordered: Stat HGB    Lab Draw Site:  Redness: No  Bruising: No   Edema: No  Pain: No       Epogen Visit     Is the patient experiencing any:  Fatigue:   []   None  [x]   Increase over baseline but not altering normal activities  []   Moderate of causing difficulty performing some activities  []   Severe or loss of ability to perform some activities  []   Bedridden or disabling    Dizziness or Lightheadedness:   [x]   None  []   No Interference  []   Interferes with functioning but not activities of daily living  []   Interferes with daily activies  []   Bedridden or disabling    Shortness of Breath:   []   None   [x]   Dyspneic on exertion   []   Dyspnea with normal activities  []   Dyspnea at rest    Edema: none Location of edema: nothing    Tachycardia: No    Heart Palpitations: No      Chest Pain: No     BP (!) 143/75   Pulse 89   Temp 98.4 °F (36.9 °C) (Oral)   Resp 16   SpO2 96%     Hold ARANZA dose if B/P is greater than: 180 mm/Hg     If Hgb remains less than 10 Increase dose by 25%. Do not increase dose more frequently than once every 4 weeks. If Hgb 10-10.5 g/dl  Continue same dose  If Hgb 10.6-11 g/dl Decrease dose by 25%. A decrease in dose can be done as frequently as every week. If Hgb is greater than 11 g/dl Hold Epogen.   May resume ARANZA when Hgb is less than 10 with a 25% reduction in the dose from the last administered dose or decrease with a 25% reduction in the dose from the last administered dose or decrease  frequency. Last visit date: 1/20/20     Last Hgb: 11.0 g/dl   Last dose: 17,000 units    Current Lab Data:    Recent Labs     02/03/20  1240   HGB 10.7*     Dose will be decreased by 25 % per order    Epogen dosage: 12,750 units was administered slowly subcutaneously into the right upper arm. Dose verified by: SADE Singletary RN    Response to treatment:  Well tolerated by patient. Education:    Verbalized understanding    Scheduled to return for next dose of Epogen on February 18, 2020 . (Patient due on 2/17/20 but has an MD appointment that day across [facility])     Electronically signed by Zuri Waite RN on 2/3/2020 at 4:00 PM

## 2020-02-18 ENCOUNTER — HOSPITAL ENCOUNTER (OUTPATIENT)
Dept: INFUSION THERAPY | Age: 80
Setting detail: INFUSION SERIES
Discharge: HOME | End: 2020-02-18
Payer: MEDICARE

## 2020-02-18 VITALS
HEART RATE: 88 BPM | SYSTOLIC BLOOD PRESSURE: 156 MMHG | RESPIRATION RATE: 17 BRPM | TEMPERATURE: 97.4 F | OXYGEN SATURATION: 98 % | WEIGHT: 198 LBS | DIASTOLIC BLOOD PRESSURE: 66 MMHG

## 2020-02-18 DIAGNOSIS — D63.1 ANEMIA IN STAGE 4 CHRONIC KIDNEY DISEASE (HCC): Primary | ICD-10-CM

## 2020-02-18 DIAGNOSIS — N18.4 CHRONIC KIDNEY DISEASE, STAGE IV (SEVERE) (HCC): ICD-10-CM

## 2020-02-18 DIAGNOSIS — N18.4 ANEMIA IN STAGE 4 CHRONIC KIDNEY DISEASE (HCC): Primary | ICD-10-CM

## 2020-02-18 LAB — HEMOGLOBIN: 9.8 G/DL (ref 13.5–17.5)

## 2020-02-18 PROCEDURE — 6360000002 HC RX W HCPCS: Performed by: INTERNAL MEDICINE

## 2020-02-18 PROCEDURE — 85018 HEMOGLOBIN: CPT

## 2020-02-18 PROCEDURE — 96372 THER/PROPH/DIAG INJ SC/IM: CPT

## 2020-02-18 RX ADMIN — EPOETIN ALFA-EPBX 16000 UNITS: 10000 INJECTION, SOLUTION INTRAVENOUS; SUBCUTANEOUS at 13:55

## 2020-02-18 NOTE — PROGRESS NOTES
[address] [facility]     Retacrit   Visit with Peripheral Lab Draw    NAME:  Marti Marc OF BIRTH:  1940  MEDICAL RECORD NUMBER:  [id number]  Episode Date:  2/18/2020    Patient arrived to [facility] 58   [] per wheelchair   [x] ambulatory  Color fair, mucus membranes pink. Pt on good spiirts. Pt had lab work done Sat. 2/15/2020 per order of Dr. Linn Baldwin . Pt .does not know name of  The lab he had his blood  drawn on Sat. Pt saw Dr. Linn Baldwin yesterday in office , he was told that his kidney fx. Has improved to 18 % . Pt was told that is iron level is low and he needs a few doses of Venofer , No orders received yet . Pt denies any change in home meds. Peripheral Lab Draw    Blood Draw Site: Location:right arm, antecubital space  Site cleansed with Chloroprep Scrub for 30 seconds: Yes  Site cleansed with Alcohol pads: No:   Labs drawn with: 23 gauge             [x] Butterfly    [] Needle  Labs Obtained: Yes. Number of attempts: 1    Lab Test(s) Ordered: stat Hbg obtained    Lab Draw Site:  Redness: No  Bruising: No   Edema: No  Pain: No       Retacrit  Visit     Is the patient experiencing any:  Fatigue:  States his energy level is good, has increased  , but he tires out by evening. Pt does not take naps.               []   None  [x]   Increase over baseline but not altering normal activities  []   Moderate of causing difficulty performing some activities  []   Severe or loss of ability to perform some activities  []   Bedridden or disabling    Dizziness or Lightheadedness:   [x]   None  []   No Interference  []   Interferes with functioning but not activities of daily living  []   Interferes with daily activies  []   Bedridden or disabling    Shortness of Breath:   []   None   [x]   Dyspneic on mod.  exertion with steps and long walk   []   Dyspnea with normal activities  []   Dyspnea at rest    Edema: none     Tachycardia: No    Heart Palpitations: No      Chest Pain: No     BP (!) 156/66   Pulse 88   Temp 97.4 °F (36.3 °C) (Oral)   Resp 17   Wt 198 lb (89.8 kg)   SpO2 98%     Hold ARANZA dose if B/P is greater than: 180    mm/Hg     If Hgb remains less than 10 Increase dose by 25%. Do not increase dose more frequently than once every 4 weeks. If Hgb 10-10.5 g/dl  Continue same dose  If Hgb 10.6-11 g/dl Decrease dose by 25%. A decrease in dose can be done as frequently as every week. If Hgb is greater than 11 g/dl Hold Retacrit. May resume ARANZA when Hgb is less than 10 with a 25% reduction in the dose from the last administered dose or decrease with a 25% reduction in the dose from the last administered dose or decrease  frequency. Last visit date: 2/3/2020    Last Hgb: 10.7 g/dl   Last dose: 12,750  units    Current Lab Data:    Recent Labs     02/18/20  1313   HGB 9.8*     Dose will be increased by 25 %    Retacrit dosage: 16,000  units was administered slowly subcutaneously into the right upper arm. Dose verified by:  Padmini Ramirez RN    Response to treatment:  Well tolerated by patient. DSD to site     Education:  Re possible s.e. from Retacrit and when to call MD   Indicates understanding    Scheduled to return for next dose of Epogen on March 3, 2020 . at 115pm.    Pt has to return to Dr. Heather Erazo. s office in 2 mos , in April. T.C to Dr. Azra Escamilla office at 1645pm.  , spoke to Cristel Villeda , medical assistant . She will send over an order from MD for 3 doses of Venofer the patient will fax over a recent office note from MD visit on 2/17/2020. Marquis Arriola  Also will fax over labs done on 2/15/2020 at [facility]--- pt had Ferritin level, PTH, Vitamin D, Renal and CBC done    Electronically signed by Carlos Alberto Lamar RN on 2/18/2020 at 5:13 PM

## 2020-05-06 ENCOUNTER — TELEPHONE (OUTPATIENT)
Dept: INFUSION THERAPY | Age: 80
End: 2020-05-06

## 2020-05-08 ENCOUNTER — HOSPITAL ENCOUNTER (OUTPATIENT)
Dept: INFUSION THERAPY | Age: 80
Setting detail: INFUSION SERIES
Discharge: HOME | End: 2020-05-08
Payer: MEDICARE

## 2020-05-08 VITALS
TEMPERATURE: 98.2 F | SYSTOLIC BLOOD PRESSURE: 132 MMHG | DIASTOLIC BLOOD PRESSURE: 62 MMHG | HEART RATE: 88 BPM | OXYGEN SATURATION: 98 % | RESPIRATION RATE: 16 BRPM

## 2020-05-08 DIAGNOSIS — N18.4 ANEMIA IN STAGE 4 CHRONIC KIDNEY DISEASE (HCC): Primary | ICD-10-CM

## 2020-05-08 DIAGNOSIS — N18.4 CHRONIC KIDNEY DISEASE, STAGE IV (SEVERE) (HCC): ICD-10-CM

## 2020-05-08 DIAGNOSIS — D63.1 ANEMIA IN STAGE 4 CHRONIC KIDNEY DISEASE (HCC): Primary | ICD-10-CM

## 2020-05-08 LAB
HEMOGLOBIN: 7.6 G/DL (ref 13.5–17.5)
IRON SATURATION: 25 % (ref 20–50)
IRON: 58 UG/DL (ref 59–158)
TOTAL IRON BINDING CAPACITY: 229 UG/DL (ref 260–445)

## 2020-05-08 PROCEDURE — 6360000002 HC RX W HCPCS: Performed by: INTERNAL MEDICINE

## 2020-05-08 PROCEDURE — 83550 IRON BINDING TEST: CPT

## 2020-05-08 PROCEDURE — 83540 ASSAY OF IRON: CPT

## 2020-05-08 PROCEDURE — 96372 THER/PROPH/DIAG INJ SC/IM: CPT

## 2020-05-08 PROCEDURE — 85018 HEMOGLOBIN: CPT

## 2020-05-08 RX ADMIN — EPOETIN ALFA-EPBX 20000 UNITS: 10000 INJECTION, SOLUTION INTRAVENOUS; SUBCUTANEOUS at 13:21

## 2020-05-08 ASSESSMENT — PAIN SCALES - GENERAL: PAINLEVEL_OUTOF10: 0

## 2020-05-08 NOTE — PROGRESS NOTES
weeks.  If Hgb 10-10.5 g/dl  Continue same dose  If Hgb 10.6-11 g/dl Decrease dose by 25%. A decrease in dose can be done as frequently as every week. If Hgb is greater than 11 g/dl Hold Epogen. May resume ARANZA when Hgb is less than 10 with a 25% reduction in the dose from the last administered dose or decrease with a 25% reduction in the dose from the last administered dose or decrease  frequency. Last visit date: 2/18/20    Last Hgb: 9.8 g/dl   Last dose: 16,000 units    Current Lab Data:    Recent Labs     05/08/20  1230   HGB 7.6*     Dose will be as ordered. New order starting today. Epogen dosage: 61012 units was administered slowly subcutaneously into the right upper arm. Dose verified by:  Wander Chambers RN    Response to treatment:  Well tolerated by patient. Education:    Verbalized understanding    Scheduled to return for next dose of Epogen on May 22, 2020 .      Electronically signed by Maranda Carvalho RN on 5/8/2020 at 1:20 PM

## 2020-05-15 NOTE — PROGRESS NOTES
[facility]     Epogen Visit with Peripheral Lab Draw    NAME:  Elyssa Elias OF BIRTH:  1940  MEDICAL RECORD NUMBER:  [id number]  Episode Date:  1/6/2020    Patient arrived to [facility] 58   [] per wheelchair   [x] ambulatory    Peripheral Lab Draw    Blood Draw Site: Location: right proximal forearm  Site cleansed with Chloroprep Scrub for 30 seconds: Yes  Labs drawn with: 23 gauge  [x] Butterfly    [] Needle  Labs Obtained: Yes  Number of attempts: 1    Lab Test(s) Ordered: Iron studies, Ferritin and STAT Hemoglobin    **Results sent to Dr Paddy Herrera via in-basket at 1428**    Lab Draw Site:  Redness: No  Bruising: No   Edema: No  Pain: No       Epogen Visit     Is the patient experiencing any:  Fatigue:   []   None  [x]   Increase over baseline but not altering normal activities - states that his energy level is good during the day but that he gets tired in the evening  []   Moderate of causing difficulty performing some activities  []   Severe or loss of ability to perform some activities  []   Bedridden or disabling    Dizziness or Lightheadedness:   [x]   None  []   No Interference  []   Interferes with functioning but not activities of daily living  []   Interferes with daily activies  []   Bedridden or disabling    Shortness of Breath:   []   None   [x]   Dyspneic on exertion - climbing stairs, lifting, walking long distances, etc.  []   Dyspnea with normal activities  []   Dyspnea at rest    Edema: +1 pitting bilateral ankles to mid calf    Tachycardia: No    Heart Palpitations: No      Chest Pain: No     /71   Pulse 85   Temp 97.5 °F (36.4 °C) (Oral)   Resp 17   SpO2 97%     Hold ARANZA dose if B/P is greater than: 180 mm/Hg     If Hgb remains less than 10 Increase dose by 25%. Do not increase dose more frequently than once every 4 weeks. If Hgb 10-10.5 g/dl  Continue same dose  If Hgb 10.6-11 g/dl Decrease dose by 25%.   A decrease in dose can be done as frequently as every week. If Hgb is greater than 11 g/dl Hold Epogen. May resume ARANZA when Hgb is less than 10 with a 25% reduction in the dose from the last administered dose or decrease with a 25% reduction in the dose from the last administered dose or decrease  frequency. Last visit date: 12/20/2019     Last Hgb: 10.4 g/dl   Last dose: 31,000 units    Current Lab Data:    Recent Labs     01/06/20  1252   HGB 10.8*     Dose will be decreased by 25% per therapy plan order    Epogen dosage: 23,000 units was administered slowly subcutaneously into the right upper arm. Dose verified by:  Padmini Ramirez RN    Response to treatment:  Well tolerated by patient. Education:    Verbalized understanding. Verbal and written instructions reviewed re: peritoneal dialysis. Written copy given via AVS    Scheduled to return for next dose of Epogen on January 20, 2020 .      Electronically signed by Crystal Lorenzo RN on 1/6/2020 at 2:24 PM [place]

## 2020-05-21 ENCOUNTER — TELEPHONE (OUTPATIENT)
Dept: INFUSION THERAPY | Age: 80
End: 2020-05-21

## 2020-05-22 ENCOUNTER — HOSPITAL ENCOUNTER (OUTPATIENT)
Dept: INFUSION THERAPY | Age: 80
Setting detail: INFUSION SERIES
Discharge: HOME | End: 2020-05-22
Payer: MEDICARE

## 2020-05-22 VITALS
HEART RATE: 89 BPM | SYSTOLIC BLOOD PRESSURE: 121 MMHG | TEMPERATURE: 97.4 F | OXYGEN SATURATION: 99 % | RESPIRATION RATE: 16 BRPM | DIASTOLIC BLOOD PRESSURE: 64 MMHG

## 2020-05-22 DIAGNOSIS — N18.4 CHRONIC KIDNEY DISEASE, STAGE IV (SEVERE) (HCC): ICD-10-CM

## 2020-05-22 DIAGNOSIS — N18.4 ANEMIA IN STAGE 4 CHRONIC KIDNEY DISEASE (HCC): Primary | ICD-10-CM

## 2020-05-22 DIAGNOSIS — D63.1 ANEMIA IN STAGE 4 CHRONIC KIDNEY DISEASE (HCC): Primary | ICD-10-CM

## 2020-05-22 LAB — HEMOGLOBIN: 8.4 G/DL (ref 13.5–17.5)

## 2020-05-22 PROCEDURE — 96372 THER/PROPH/DIAG INJ SC/IM: CPT

## 2020-05-22 PROCEDURE — 6360000002 HC RX W HCPCS: Performed by: INTERNAL MEDICINE

## 2020-05-22 PROCEDURE — 85018 HEMOGLOBIN: CPT

## 2020-05-22 RX ADMIN — EPOETIN ALFA-EPBX 20000 UNITS: 10000 INJECTION, SOLUTION INTRAVENOUS; SUBCUTANEOUS at 13:35

## 2020-05-22 ASSESSMENT — PAIN SCALES - GENERAL: PAINLEVEL_OUTOF10: 0

## 2020-05-22 NOTE — PROGRESS NOTES
[address] [facility]     Epogen Visit with Peripheral Lab Draw    NAME:  Vera Sales OF BIRTH:  1940  MEDICAL RECORD NUMBER:  [id number]  Episode Date:  5/22/2020    Patient arrived to [facility] 58   [] per wheelchair   [x] ambulatory    Alert and oriented X 4. Denies any s/s of infection. Afebrile. Wearing a face mask to prevent spread of COVID-19    Peripheral Lab Draw    Blood Draw Site: Location:right arm  Site cleansed with Chloroprep Scrub for 30 seconds: Yes  Site cleansed with Alcohol pads: Yes  Labs drawn with: 23 gauge             [x] Butterfly    [] Needle  Labs Obtained: Yes  Number of attempts: 1    Lab Test(s) Ordered: Stat HGB    Lab Draw Site:  Redness: No  Bruising: No   Edema: No  Pain: No       Epogen Visit     Is the patient experiencing any:  Fatigue:   []   None  [x]   Increase over baseline but not altering normal activities  []   Moderate of causing difficulty performing some activities  []   Severe or loss of ability to perform some activities  []   Bedridden or disabling    Dizziness or Lightheadedness:   [x]   None  []   No Interference  []   Interferes with functioning but not activities of daily living  []   Interferes with daily activies  []   Bedridden or disabling    Shortness of Breath:   []   None   [x]   Dyspneic on exertion   []   Dyspnea with normal activities  []   Dyspnea at rest    Edema: none Location of edema: nothing    Tachycardia: No    Heart Palpitations: No      Chest Pain: No     /64   Pulse 89   Temp 97.4 °F (36.3 °C) (Oral)   Resp 16   SpO2 99%     Hold ARANZA dose if B/P is greater than: 180 mm/Hg     If Hgb remains less than 10 Increase dose by 25%. Do not increase dose more frequently than once every 4 weeks. If Hgb 10-10.5 g/dl  Continue same dose  If Hgb 10.6-11 g/dl Decrease dose by 25%. A decrease in dose can be done as frequently as every week.   If Hgb is greater than 11 g/dl Hold Epogen. May resume ARANZA when Hgb is less than 10 with a 25% reduction in the dose from the last administered dose or decrease with a 25% reduction in the dose from the last administered dose or decrease  frequency. Last visit date: 5/8/20     Last Hgb: 7.6g/dl   Last dose: 20,000 units    Current Lab Data:    Recent Labs     05/22/20  1240   HGB 8.4*     Dose will remain the same per order    Epogen dosage: 20,000 units was administered slowly subcutaneously into the right upper arm. Dose verified by:  Rosalva Mohan RN    Response to treatment:  Well tolerated by patient. Education:    Verbalized understanding    Scheduled to return for next dose of Epogen on June 5, 2020 .      Electronically signed by Magdalene Babcock RN on 5/22/2020 at 1:24 PM

## 2020-06-05 ENCOUNTER — HOSPITAL ENCOUNTER (OUTPATIENT)
Dept: INFUSION THERAPY | Age: 80
Setting detail: INFUSION SERIES
Discharge: HOME | End: 2020-06-05
Payer: MEDICARE

## 2020-06-05 VITALS
TEMPERATURE: 98.3 F | SYSTOLIC BLOOD PRESSURE: 105 MMHG | DIASTOLIC BLOOD PRESSURE: 63 MMHG | HEART RATE: 88 BPM | OXYGEN SATURATION: 97 % | RESPIRATION RATE: 16 BRPM

## 2020-06-05 DIAGNOSIS — D63.1 ANEMIA IN STAGE 4 CHRONIC KIDNEY DISEASE (HCC): Primary | ICD-10-CM

## 2020-06-05 DIAGNOSIS — N18.4 CHRONIC KIDNEY DISEASE, STAGE IV (SEVERE) (HCC): ICD-10-CM

## 2020-06-05 DIAGNOSIS — N18.4 ANEMIA IN STAGE 4 CHRONIC KIDNEY DISEASE (HCC): Primary | ICD-10-CM

## 2020-06-05 LAB — HEMOGLOBIN: 8.6 G/DL (ref 13.5–17.5)

## 2020-06-05 PROCEDURE — 6360000002 HC RX W HCPCS: Performed by: INTERNAL MEDICINE

## 2020-06-05 PROCEDURE — 85018 HEMOGLOBIN: CPT

## 2020-06-05 PROCEDURE — 96372 THER/PROPH/DIAG INJ SC/IM: CPT

## 2020-06-05 RX ADMIN — EPOETIN ALFA-EPBX 20000 UNITS: 10000 INJECTION, SOLUTION INTRAVENOUS; SUBCUTANEOUS at 13:27

## 2020-06-05 ASSESSMENT — PAIN SCALES - GENERAL: PAINLEVEL_OUTOF10: 0

## 2020-06-05 NOTE — PROGRESS NOTES
[address] [facility]     Epogen Visit with Peripheral Lab Draw    NAME:  Vargas Jules OF BIRTH:  1940  MEDICAL RECORD NUMBER:  [id number]  Episode Date:  6/5/2020    Patient arrived to [facility] 58   [] per wheelchair   [x] ambulatory    Alert and oriented X 4. Denies any s/s of infection, afebrile, wearing a face mask to prevent the spread of COVID-19. Denies any side effects from last injection. Peripheral Lab Draw    Blood Draw Site: Location:right arm  Site cleansed with Chloroprep Scrub for 30 seconds: Yes  Site cleansed with Alcohol pads: Yes  Labs drawn with: 23 gauge             [x] Butterfly    [] Needle  Labs Obtained: Yes  Number of attempts: 1    Lab Test(s) Ordered: stat HGB    Lab Draw Site:  Redness: No  Bruising: No   Edema: No  Pain: No       Epogen Visit     Is the patient experiencing any:  Fatigue:   []   None  [x]   Increase over baseline but not altering normal activities  []   Moderate of causing difficulty performing some activities  []   Severe or loss of ability to perform some activities  []   Bedridden or disabling    Dizziness or Lightheadedness:   [x]   None  []   No Interference  []   Interferes with functioning but not activities of daily living  []   Interferes with daily activies  []   Bedridden or disabling    Shortness of Breath:   []   None   [x]   Dyspneic on exertion stairs  []   Dyspnea with normal activities  []   Dyspnea at rest    Edema: none Location of edema: nothing    Tachycardia: No    Heart Palpitations: No      Chest Pain: No     /63   Pulse 88   Temp 98.3 °F (36.8 °C) (Oral)   Resp 16   SpO2 97%     Hold ARANZA dose if B/P is greater than: 180 mm/Hg     If Hgb remains less than 10 Increase dose by 25%. Do not increase dose more frequently than once every 4 weeks. If Hgb 10-10.5 g/dl  Continue same dose  If Hgb 10.6-11 g/dl Decrease dose by 25%.   A decrease in dose can be done as frequently as every week. If Hgb is greater than 11 g/dl Hold Epogen. May resume ARANZA when Hgb is less than 10 with a 25% reduction in the dose from the last administered dose or decrease with a 25% reduction in the dose from the last administered dose or decrease  frequency. Last visit date: 5/22/20     Last Hgb: 8.4 g/dl   Last dose: 20,000 units    Current Lab Data:    Recent Labs     06/05/20  1245   HGB 8.6*     Dose will be remain the same    Epogen dosage: 20,000 units was administered slowly subcutaneously into the right upper arm. Dose verified by: HAJA Claire RN    Response to treatment:  Well tolerated by patient. Education:    Verbalized understanding    Scheduled to return for next dose of Epogen on June 19, 2020 .      Electronically signed by Magdalene Babcock RN on 6/5/2020 at 1:39 PM

## 2020-06-19 ENCOUNTER — HOSPITAL ENCOUNTER (OUTPATIENT)
Dept: INFUSION THERAPY | Age: 80
Setting detail: INFUSION SERIES
Discharge: HOME | End: 2020-06-19
Payer: MEDICARE

## 2020-06-19 VITALS
HEART RATE: 86 BPM | TEMPERATURE: 97.9 F | OXYGEN SATURATION: 97 % | SYSTOLIC BLOOD PRESSURE: 116 MMHG | DIASTOLIC BLOOD PRESSURE: 69 MMHG | RESPIRATION RATE: 16 BRPM

## 2020-06-19 DIAGNOSIS — D63.1 ANEMIA IN STAGE 4 CHRONIC KIDNEY DISEASE (HCC): Primary | ICD-10-CM

## 2020-06-19 DIAGNOSIS — N18.4 ANEMIA IN STAGE 4 CHRONIC KIDNEY DISEASE (HCC): Primary | ICD-10-CM

## 2020-06-19 DIAGNOSIS — N18.4 CHRONIC KIDNEY DISEASE, STAGE IV (SEVERE) (HCC): ICD-10-CM

## 2020-06-19 LAB — HEMOGLOBIN: 10.1 G/DL (ref 13.5–17.5)

## 2020-06-19 PROCEDURE — 85018 HEMOGLOBIN: CPT

## 2020-06-19 PROCEDURE — 6360000002 HC RX W HCPCS: Performed by: INTERNAL MEDICINE

## 2020-06-19 PROCEDURE — 96372 THER/PROPH/DIAG INJ SC/IM: CPT

## 2020-06-19 RX ADMIN — EPOETIN ALFA-EPBX 20000 UNITS: 10000 INJECTION, SOLUTION INTRAVENOUS; SUBCUTANEOUS at 13:04

## 2020-06-19 ASSESSMENT — PAIN SCALES - GENERAL: PAINLEVEL_OUTOF10: 0

## 2020-06-19 NOTE — PROGRESS NOTES
[facility]     Epogen Visit with Peripheral Lab Draw    NAME:  Ladan Valencia OF BIRTH:  1940  MEDICAL RECORD NUMBER:  [id number]  Episode Date:  6/19/2020    Patient arrived to [facility] 58   [] per wheelchair   [x] ambulatory    Peripheral Lab Draw    Blood Draw Site: Location:right arm  Site cleansed with Chloroprep Scrub for 30 seconds: Yes  Site cleansed with Alcohol pads: Yes  Labs drawn with: 25 gauge             [x] Butterfly    [] Needle  Labs Obtained: Yes  Number of attempts: 1    Lab Test(s) Ordered: HGB    Lab Draw Site:  Redness: No  Bruising: No   Edema: No  Pain: No       Epogen Visit     Is the patient experiencing any:  Fatigue:   []   None  [x]   Increase over baseline but not altering normal activities  []   Moderate of causing difficulty performing some activities  []   Severe or loss of ability to perform some activities  []   Bedridden or disabling    Dizziness or Lightheadedness:   [x]   None  []   No Interference  []   Interferes with functioning but not activities of daily living  []   Interferes with daily activies  []   Bedridden or disabling    Shortness of Breath:   []   None   [x]   Dyspneic on exertion   []   Dyspnea with normal activities  []   Dyspnea at rest    Edema: none     Tachycardia: No    Heart Palpitations: No      Chest Pain: No     /69   Pulse 86   Temp 97.9 °F (36.6 °C) (Oral)   Resp 16   SpO2 97%     Hold ARANZA dose if B/P is greater than: 180 mm/Hg     If Hgb remains less than 10 Increase dose by 25%. Do not increase dose more frequently than once every 4 weeks. If Hgb 10-10.5 g/dl  Continue same dose  If Hgb 10.6-11 g/dl Decrease dose by 25%. A decrease in dose can be done as frequently as every week. If Hgb is greater than 11 g/dl Hold Epogen.   May resume ARANZA when Hgb is less than 10 with a 25% reduction in the dose from the last administered dose or decrease with a 25% reduction in the dose from the last administered dose or decrease  frequency. Last visit date: 6/5/20     Last Hgb: 8.6 g/dl   Last dose: 39390 units    Current Lab Data:    Recent Labs     06/19/20  1227   HGB 10.1*     Dose will be the same    Epogen dosage: [id number] units was administered slowly subcutaneously into the right upper arm. Dose verified by:  Freddy Jade RN    Response to treatment:  Well tolerated by patient. Education:    Verbalized understanding and printed AVS given to patient    Scheduled to return for next dose of Epogen on July 2, 2020 .      Electronically signed by Rhiannon Pulido RN on 6/19/2020 at 1:09 PM

## 2020-07-02 ENCOUNTER — HOSPITAL ENCOUNTER (OUTPATIENT)
Dept: INFUSION THERAPY | Age: 80
Setting detail: INFUSION SERIES
Discharge: HOME | End: 2020-07-02
Payer: MEDICARE

## 2020-07-02 VITALS
RESPIRATION RATE: 16 BRPM | SYSTOLIC BLOOD PRESSURE: 119 MMHG | OXYGEN SATURATION: 98 % | TEMPERATURE: 97.9 F | DIASTOLIC BLOOD PRESSURE: 64 MMHG | HEART RATE: 84 BPM

## 2020-07-02 DIAGNOSIS — N18.4 ANEMIA IN STAGE 4 CHRONIC KIDNEY DISEASE (HCC): Primary | ICD-10-CM

## 2020-07-02 DIAGNOSIS — D63.1 ANEMIA IN STAGE 4 CHRONIC KIDNEY DISEASE (HCC): Primary | ICD-10-CM

## 2020-07-02 DIAGNOSIS — N18.4 CHRONIC KIDNEY DISEASE, STAGE IV (SEVERE) (HCC): ICD-10-CM

## 2020-07-02 LAB — HEMOGLOBIN: 10.2 G/DL (ref 13.5–17.5)

## 2020-07-02 PROCEDURE — 96372 THER/PROPH/DIAG INJ SC/IM: CPT

## 2020-07-02 PROCEDURE — 85018 HEMOGLOBIN: CPT

## 2020-07-02 PROCEDURE — 6360000002 HC RX W HCPCS: Performed by: INTERNAL MEDICINE

## 2020-07-02 RX ADMIN — EPOETIN ALFA-EPBX 20000 UNITS: 10000 INJECTION, SOLUTION INTRAVENOUS; SUBCUTANEOUS at 13:08

## 2020-07-02 NOTE — PROGRESS NOTES
[address] [facility]     Epogen Visit with Peripheral Lab Draw    NAME:  Brissa Baxter OF BIRTH:  1940  MEDICAL RECORD NUMBER:  [id number]  Episode Date:  7/2/2020    Patient arrived to [facility] 58   [] per wheelchair   [x] ambulatory    Peripheral Lab Draw    Blood Draw Site: Location:right arm  Site cleansed with Chloroprep Scrub for 30 seconds: Yes  Site cleansed with Alcohol pads: Yes  Labs drawn with: 25 gauge             [x] Butterfly    [] Needle  Labs Obtained: Yes  Number of attempts: 1    Lab Test(s) Ordered: HGB    Lab Draw Site:  Redness: No  Bruising: No   Edema: No  Pain: No       Epogen Visit     Is the patient experiencing any:  Fatigue:   []   None  [x]   Increase over baseline but not altering normal activities  []   Moderate of causing difficulty performing some activities  []   Severe or loss of ability to perform some activities  []   Bedridden or disabling    Dizziness or Lightheadedness:   []   None  [x]   No Interference  []   Interferes with functioning but not activities of daily living  []   Interferes with daily activies  []   Bedridden or disabling    Shortness of Breath:   []   None   [x]   Dyspneic on exertion   []   Dyspnea with normal activities  []   Dyspnea at rest    Edema: none    Tachycardia: No    Heart Palpitations: No      Chest Pain: No     /64   Pulse 84   Temp 97.9 °F (36.6 °C) (Oral)   Resp 16   SpO2 98%     Hold ARANZA dose if B/P is greater than: 180 mm/Hg     If Hgb remains less than 10 Increase dose by 25%. Do not increase dose more frequently than once every 4 weeks. If Hgb 10-10.5 g/dl  Continue same dose  If Hgb 10.6-11 g/dl Decrease dose by 25%. A decrease in dose can be done as frequently as every week. If Hgb is greater than 11 g/dl Hold Epogen.   May resume ARANZA when Hgb is less than 10 with a 25% reduction in the dose from the last administered dose or decrease with a 25% reduction in the dose from the last administered dose or decrease  frequency. Last visit date: 6/19/2020    Last Hgb: 10.1 g/dl   Last dose: 20722 units    Current Lab Data:    Recent Labs     07/02/20  1234   HGB 10.2*     Dose will be the same    Epogen dosage: 51907 units was administered slowly subcutaneously into the right upper arm. Dose verified by: Erendira Spain RN    Response to treatment:  Well tolerated by patient. Education:    Verbalized understanding and printed AVS given to patient    Scheduled to return for next dose of Epogen on July 17, 2020 .      Electronically signed by Denny Lawson RN on 7/2/2020 at 12:48 PM

## 2020-07-17 ENCOUNTER — HOSPITAL ENCOUNTER (OUTPATIENT)
Dept: INFUSION THERAPY | Age: 80
Setting detail: INFUSION SERIES
Discharge: HOME | End: 2020-07-17
Payer: MEDICARE

## 2020-07-17 DIAGNOSIS — N18.4 ANEMIA IN STAGE 4 CHRONIC KIDNEY DISEASE (HCC): Primary | ICD-10-CM

## 2020-07-17 DIAGNOSIS — D63.1 ANEMIA IN STAGE 4 CHRONIC KIDNEY DISEASE (HCC): Primary | ICD-10-CM

## 2020-07-17 DIAGNOSIS — N18.4 CHRONIC KIDNEY DISEASE, STAGE IV (SEVERE) (HCC): ICD-10-CM

## 2020-07-24 ENCOUNTER — HOSPITAL ENCOUNTER (OUTPATIENT)
Dept: INFUSION THERAPY | Age: 80
Setting detail: INFUSION SERIES
Discharge: HOME | End: 2020-07-24
Payer: MEDICARE

## 2020-07-24 VITALS
OXYGEN SATURATION: 97 % | RESPIRATION RATE: 16 BRPM | DIASTOLIC BLOOD PRESSURE: 70 MMHG | SYSTOLIC BLOOD PRESSURE: 128 MMHG | TEMPERATURE: 98.9 F | HEART RATE: 80 BPM

## 2020-07-24 DIAGNOSIS — N18.4 ANEMIA IN STAGE 4 CHRONIC KIDNEY DISEASE (HCC): Primary | ICD-10-CM

## 2020-07-24 DIAGNOSIS — D63.1 ANEMIA IN STAGE 4 CHRONIC KIDNEY DISEASE (HCC): Primary | ICD-10-CM

## 2020-07-24 DIAGNOSIS — N18.4 CHRONIC KIDNEY DISEASE, STAGE IV (SEVERE) (HCC): ICD-10-CM

## 2020-07-24 PROCEDURE — 96372 THER/PROPH/DIAG INJ SC/IM: CPT

## 2020-07-24 PROCEDURE — 6360000002 HC RX W HCPCS: Performed by: INTERNAL MEDICINE

## 2020-07-24 RX ADMIN — EPOETIN ALFA-EPBX 20000 UNITS: 10000 INJECTION, SOLUTION INTRAVENOUS; SUBCUTANEOUS at 15:21

## 2020-07-24 ASSESSMENT — PAIN SCALES - GENERAL: PAINLEVEL_OUTOF10: 0

## 2020-07-24 NOTE — PROGRESS NOTES
[address] [facility]     Epogen Visit with Peripheral Lab Draw    NAME:  Varinder Slade OF BIRTH:  1940  MEDICAL RECORD NUMBER:  [id number]  Episode Date:  7/24/2020    Patient arrived to [facility] 58   [] per wheelchair   [x] ambulatory    Epogen Visit     Is the patient experiencing any:  Fatigue:   []   None  [x]   Increase over baseline but not altering normal activities - patient reports that his energy level has improved and he feels he can do most of his activities without getting too tired. []   Moderate of causing difficulty performing some activities  []   Severe or loss of ability to perform some activities  []   Bedridden or disabling    Dizziness or Lightheadedness:   []   None  [x]   No Interference  []   Interferes with functioning but not activities of daily living  []   Interferes with daily activies  []   Bedridden or disabling    Shortness of Breath:   []   None   [x]   Dyspneic on exertion - reports that the only time he feel SOB is when he has to climb stairs. []   Dyspnea with normal activities  []   Dyspnea at rest    Edema: None noted. Tachycardia: No    Heart Palpitations: No      Chest Pain: No     /70   Pulse 80   Temp 98.9 °F (37.2 °C) (Oral)   Resp 16   SpO2 97%     Hold ARANZA dose if B/P is greater than: 180 mm/Hg     If Hgb remains less than 10 Increase dose by 25%. Do not increase dose more frequently than once every 4 weeks. If Hgb 10-10.5 g/dl  Continue same dose  If Hgb 10.6-11 g/dl Decrease dose by 25%. A decrease in dose can be done as frequently as every week. If Hgb is greater than 11 g/dl Hold Epogen. May resume ARANZA when Hgb is less than 10 with a 25% reduction in the dose from the last administered dose or decrease with a 25% reduction in the dose from the last administered dose or decrease  frequency.        Last visit date: 7/2/2020     Last Hgb: 10.2 g/dl   Last dose: 20,000 units    Current Lab Data:  (drawn at [employer] in the [email or web address] store on [address] on 7/23/2020 - full report scanned in Epic)  Hemoglobin - 10.0  Hematocrit - 30.7    Dose will remain the same     Epogen dosage: 20,000 units was administered slowly subcutaneously into the right upper arm. Dose verified by:  Hiram Amaral RN    Response to treatment:  Well tolerated by patient. Education:    Verbal and written discharge instructions reviewed with patient. Verbalized understanding. Written copy given via AVS    Scheduled to return for next dose of Epogen on August 7, 2020 .      Electronically signed by Mary Kay Silva RN on 7/24/2020 at 6:48 PM

## 2020-07-30 RX ORDER — DIPHENHYDRAMINE HYDROCHLORIDE 50 MG/ML
50 INJECTION INTRAMUSCULAR; INTRAVENOUS ONCE
Status: CANCELLED | OUTPATIENT
Start: 2020-08-07

## 2020-07-30 RX ORDER — MEPERIDINE HYDROCHLORIDE 25 MG/ML
12.5 INJECTION INTRAMUSCULAR; INTRAVENOUS; SUBCUTANEOUS ONCE
Status: CANCELLED | OUTPATIENT
Start: 2020-08-07

## 2020-07-30 RX ORDER — EPINEPHRINE 1 MG/ML
0.3 INJECTION, SOLUTION, CONCENTRATE INTRAVENOUS PRN
Status: CANCELLED | OUTPATIENT
Start: 2020-08-07

## 2020-07-30 RX ORDER — METHYLPREDNISOLONE SODIUM SUCCINATE 125 MG/2ML
125 INJECTION, POWDER, LYOPHILIZED, FOR SOLUTION INTRAMUSCULAR; INTRAVENOUS ONCE
Status: CANCELLED | OUTPATIENT
Start: 2020-08-07

## 2020-07-30 RX ORDER — SODIUM CHLORIDE 9 MG/ML
INJECTION, SOLUTION INTRAVENOUS CONTINUOUS
Status: CANCELLED | OUTPATIENT
Start: 2020-08-07

## 2020-07-31 ENCOUNTER — APPOINTMENT (OUTPATIENT)
Dept: INFUSION THERAPY | Age: 80
End: 2020-07-31
Payer: MEDICARE

## 2020-08-07 ENCOUNTER — HOSPITAL ENCOUNTER (OUTPATIENT)
Dept: INFUSION THERAPY | Age: 80
Setting detail: INFUSION SERIES
Discharge: HOME | End: 2020-08-07
Payer: MEDICARE

## 2020-08-07 VITALS
SYSTOLIC BLOOD PRESSURE: 119 MMHG | RESPIRATION RATE: 16 BRPM | TEMPERATURE: 97.9 F | HEART RATE: 75 BPM | OXYGEN SATURATION: 97 % | DIASTOLIC BLOOD PRESSURE: 67 MMHG

## 2020-08-07 DIAGNOSIS — N18.4 CHRONIC KIDNEY DISEASE, STAGE IV (SEVERE) (HCC): ICD-10-CM

## 2020-08-07 DIAGNOSIS — N18.4 ANEMIA IN STAGE 4 CHRONIC KIDNEY DISEASE (HCC): Primary | ICD-10-CM

## 2020-08-07 DIAGNOSIS — D63.1 ANEMIA IN STAGE 4 CHRONIC KIDNEY DISEASE (HCC): Primary | ICD-10-CM

## 2020-08-07 LAB — HEMOGLOBIN: 9.8 G/DL (ref 13.5–17.5)

## 2020-08-07 PROCEDURE — 6360000002 HC RX W HCPCS: Performed by: INTERNAL MEDICINE

## 2020-08-07 PROCEDURE — 96372 THER/PROPH/DIAG INJ SC/IM: CPT

## 2020-08-07 PROCEDURE — 85018 HEMOGLOBIN: CPT

## 2020-08-07 PROCEDURE — 6360000002 HC RX W HCPCS

## 2020-08-07 PROCEDURE — 96374 THER/PROPH/DIAG INJ IV PUSH: CPT

## 2020-08-07 PROCEDURE — 2580000003 HC RX 258: Performed by: INTERNAL MEDICINE

## 2020-08-07 RX ORDER — METHYLPREDNISOLONE SODIUM SUCCINATE 125 MG/2ML
125 INJECTION, POWDER, LYOPHILIZED, FOR SOLUTION INTRAMUSCULAR; INTRAVENOUS ONCE
Status: CANCELLED | OUTPATIENT
Start: 2020-08-21

## 2020-08-07 RX ORDER — DIPHENHYDRAMINE HYDROCHLORIDE 50 MG/ML
50 INJECTION INTRAMUSCULAR; INTRAVENOUS ONCE
Status: CANCELLED | OUTPATIENT
Start: 2020-08-21

## 2020-08-07 RX ORDER — MEPERIDINE HYDROCHLORIDE 25 MG/ML
12.5 INJECTION INTRAMUSCULAR; INTRAVENOUS; SUBCUTANEOUS ONCE
Status: CANCELLED | OUTPATIENT
Start: 2020-08-21

## 2020-08-07 RX ORDER — SODIUM CHLORIDE 0.9 % (FLUSH) 0.9 %
10 SYRINGE (ML) INJECTION PRN
Status: CANCELLED | OUTPATIENT
Start: 2020-08-21

## 2020-08-07 RX ORDER — SODIUM CHLORIDE 9 MG/ML
INJECTION, SOLUTION INTRAVENOUS CONTINUOUS
Status: CANCELLED | OUTPATIENT
Start: 2020-08-21

## 2020-08-07 RX ORDER — EPINEPHRINE 1 MG/ML
0.3 INJECTION, SOLUTION, CONCENTRATE INTRAVENOUS PRN
Status: CANCELLED | OUTPATIENT
Start: 2020-08-21

## 2020-08-07 RX ORDER — SODIUM CHLORIDE 0.9 % (FLUSH) 0.9 %
10 SYRINGE (ML) INJECTION PRN
Status: DISCONTINUED | OUTPATIENT
Start: 2020-08-07 | End: 2020-08-08 | Stop reason: HOSPADM

## 2020-08-07 RX ADMIN — IRON SUCROSE 200 MG: 20 INJECTION, SOLUTION INTRAVENOUS at 12:43

## 2020-08-07 RX ADMIN — Medication 10 ML: at 12:28

## 2020-08-07 RX ADMIN — EPOETIN ALFA-EPBX 25000 UNITS: 40000 INJECTION, SOLUTION INTRAVENOUS; SUBCUTANEOUS at 13:03

## 2020-08-07 ASSESSMENT — PAIN SCALES - GENERAL: PAINLEVEL_OUTOF10: 0

## 2020-08-07 NOTE — PROGRESS NOTES
[facility] [facility]     Epogen Visit with Peripheral Lab Draw    NAME:  Ermias Miller OF BIRTH:  1940  MEDICAL RECORD NUMBER:  [id number]  Episode Date:  8/7/2020    Patient arrived to [facility] 58   [] per wheelchair   [x] ambulatory    Peripheral Lab Draw    Blood Draw Site: Location:right arm  Site cleansed with Chloroprep Scrub for 30 seconds: Yes  Site cleansed with Alcohol pads: Yes  Labs drawn with: 22 gauge             [] Butterfly    [x] Needle  Labs Obtained: Yes  Number of attempts: 1    Lab Test(s) Ordered:  HGB    Lab Draw Site:  Redness: No  Bruising: No   Edema: No  Pain: No       Epogen Visit     Is the patient experiencing any:  Fatigue:   []   None  [x]   Increase over baseline but not altering normal activities  []   Moderate of causing difficulty performing some activities  []   Severe or loss of ability to perform some activities  []   Bedridden or disabling    Dizziness or Lightheadedness:   [x]   None  []   No Interference  []   Interferes with functioning but not activities of daily living  []   Interferes with daily activies  []   Bedridden or disabling    Shortness of Breath:   []   None   [x]   Dyspneic on exertion   []   Dyspnea with normal activities  []   Dyspnea at rest    Edema: none     Tachycardia: No    Heart Palpitations: No      Chest Pain: No     /67   Pulse 75   Temp 97.9 °F (36.6 °C) (Oral)   Resp 16   SpO2 97%     Hold ARANZA dose if B/P is greater than: 180 mm/Hg     If Hgb remains less than 10 Increase dose by 25%. Do not increase dose more frequently than once every 4 weeks. If Hgb 10-10.5 g/dl  Continue same dose  If Hgb 10.6-11 g/dl Decrease dose by 25%. A decrease in dose can be done as frequently as every week. If Hgb is greater than 11 g/dl Hold Epogen.   May resume ARANZA when Hgb is less than 10 with a 25% reduction in the dose from the last administered dose or decrease with a 25% reduction in the dose from the last administered dose or decrease  frequency. Last visit date: 7/24/2020     Last Hgb: 10.0 g/dl   Last dose: 76576 units    Current Lab Data:    Recent Labs     08/07/20  1230   HGB 9.8*     Dose will be increased    Epogen dosage: 26955 units was administered slowly subcutaneously into the right upper arm. Dose verified by:  AUBREY May RN    Response to treatment:  Well tolerated by patient. Education:    Verbalized understanding    Scheduled to return for next dose of Epogen on August 21, 2020 . Electronically signed by Maddie Olvera RN on 8/7/2020 at 1:16 PM  Outpatient [place] [address]     Venofer Visit    NAME:  Mary Day OF BIRTH:  1940  MEDICAL RECORD NUMBER:  [id number]  Episode Date:  8/7/2020    Patient arrived to [facility] 58   [] per wheelchair    [x] ambulatory     Has the patient had a previous problem with Venofer Infusion? No  Any current infection or illness? No   Patient on antibiotics? No   History of Hypertension?  Yes      /67   Pulse 75   Temp 97.9 °F (36.6 °C) (Oral)   Resp 16   SpO2 97%   Patient Vitals for the past 2 hrs:   BP Temp Temp src Pulse Resp SpO2   08/07/20 1236 119/67 97.9 °F (36.6 °C) Oral 75 16 97 %       Is the patient experiencing any:  Fatigue:   []   None  []   Increase over baseline but not altering normal activities  [x]   Moderate of causing difficulty performing some activities  []   Severe or loss of ability to perform some activities  []   Bedridden or disabling     Dizziness or Lightheadedness:   [x]   None  []   No Interference  []   Interferes with functioning but not activities of daily living  []   Interferes with daily activies  []   Bedridden or disabling    Shortness of Breath:   []   None   [x]   Dyspneic on exertion   []   Dyspnea with normal activities  []   Dyspnea at rest    Edema: none     Tachycardia: No    Heart Palpitations: No      Chest Pain: No      /67   Pulse 75   Temp 97.9 °F (36.6 °C) (Oral)   Resp 16   SpO2 97%   Patient Vitals for the past 2 hrs:   BP Temp Temp src Pulse Resp SpO2   08/07/20 1236 119/67 97.9 °F (36.6 °C) Oral 75 16 97 %       Current Lab Data:  Hemoglobin/Hematocrit:    Lab Results   Component Value Date    HGB 9.8 08/07/2020    HCT 21.9 09/26/2019     IRON:    Lab Results   Component Value Date    IRON 58 05/08/2020     Iron Saturation:    Lab Results   Component Value Date    LABIRON 25 05/08/2020     TIBC:    Lab Results   Component Value Date    TIBC 229 05/08/2020     FERRITIN:  147 drawn on 7/23/2020 at [address] [facility]    Dose Administered: 200 mg  Todays dose is number 1 out of 3 ordered for this patient. Response to treatment:  Well tolerated by patient. Education:    Verbalized understanding and printed AVS given to patient    Scheduled to return for next dose of Venofer on August 21, 2020.      Electronically signed by Min Epps RN on 8/7/2020 at 1:16 PM

## 2020-08-21 ENCOUNTER — HOSPITAL ENCOUNTER (OUTPATIENT)
Dept: INFUSION THERAPY | Age: 80
Setting detail: INFUSION SERIES
Discharge: HOME | End: 2020-08-21
Payer: MEDICARE

## 2020-08-21 VITALS
OXYGEN SATURATION: 96 % | SYSTOLIC BLOOD PRESSURE: 138 MMHG | DIASTOLIC BLOOD PRESSURE: 66 MMHG | HEART RATE: 82 BPM | RESPIRATION RATE: 16 BRPM | TEMPERATURE: 98 F

## 2020-08-21 DIAGNOSIS — N18.4 ANEMIA IN STAGE 4 CHRONIC KIDNEY DISEASE (HCC): Primary | ICD-10-CM

## 2020-08-21 DIAGNOSIS — D63.1 ANEMIA IN STAGE 4 CHRONIC KIDNEY DISEASE (HCC): Primary | ICD-10-CM

## 2020-08-21 DIAGNOSIS — N18.4 CHRONIC KIDNEY DISEASE, STAGE IV (SEVERE) (HCC): ICD-10-CM

## 2020-08-21 LAB — HEMOGLOBIN: 10.1 G/DL (ref 13.5–17.5)

## 2020-08-21 PROCEDURE — 6360000002 HC RX W HCPCS

## 2020-08-21 PROCEDURE — 96374 THER/PROPH/DIAG INJ IV PUSH: CPT

## 2020-08-21 PROCEDURE — 96372 THER/PROPH/DIAG INJ SC/IM: CPT

## 2020-08-21 PROCEDURE — 6360000002 HC RX W HCPCS: Performed by: INTERNAL MEDICINE

## 2020-08-21 PROCEDURE — 85018 HEMOGLOBIN: CPT

## 2020-08-21 PROCEDURE — 2580000003 HC RX 258: Performed by: INTERNAL MEDICINE

## 2020-08-21 RX ORDER — DIPHENHYDRAMINE HYDROCHLORIDE 50 MG/ML
50 INJECTION INTRAMUSCULAR; INTRAVENOUS ONCE
Status: CANCELLED | OUTPATIENT
Start: 2020-09-04

## 2020-08-21 RX ORDER — SODIUM CHLORIDE 0.9 % (FLUSH) 0.9 %
10 SYRINGE (ML) INJECTION PRN
Status: CANCELLED | OUTPATIENT
Start: 2020-09-04

## 2020-08-21 RX ORDER — METHYLPREDNISOLONE SODIUM SUCCINATE 125 MG/2ML
125 INJECTION, POWDER, LYOPHILIZED, FOR SOLUTION INTRAMUSCULAR; INTRAVENOUS ONCE
Status: CANCELLED | OUTPATIENT
Start: 2020-09-04

## 2020-08-21 RX ORDER — SODIUM CHLORIDE 9 MG/ML
INJECTION, SOLUTION INTRAVENOUS CONTINUOUS
Status: CANCELLED | OUTPATIENT
Start: 2020-09-04

## 2020-08-21 RX ORDER — SODIUM CHLORIDE 0.9 % (FLUSH) 0.9 %
10 SYRINGE (ML) INJECTION PRN
Status: DISCONTINUED | OUTPATIENT
Start: 2020-08-21 | End: 2020-08-22 | Stop reason: HOSPADM

## 2020-08-21 RX ORDER — MEPERIDINE HYDROCHLORIDE 25 MG/ML
12.5 INJECTION INTRAMUSCULAR; INTRAVENOUS; SUBCUTANEOUS ONCE
Status: CANCELLED | OUTPATIENT
Start: 2020-09-04

## 2020-08-21 RX ORDER — EPINEPHRINE 1 MG/ML
0.3 INJECTION, SOLUTION, CONCENTRATE INTRAVENOUS PRN
Status: CANCELLED | OUTPATIENT
Start: 2020-09-04

## 2020-08-21 RX ADMIN — Medication 10 ML: at 12:35

## 2020-08-21 RX ADMIN — Medication 10 ML: at 12:37

## 2020-08-21 RX ADMIN — IRON SUCROSE 200 MG: 20 INJECTION, SOLUTION INTRAVENOUS at 12:50

## 2020-08-21 RX ADMIN — EPOETIN ALFA-EPBX 25000 UNITS: 10000 INJECTION, SOLUTION INTRAVENOUS; SUBCUTANEOUS at 13:18

## 2020-08-21 ASSESSMENT — PAIN SCALES - GENERAL: PAINLEVEL_OUTOF10: 0

## 2020-08-21 NOTE — PROGRESS NOTES
Outpatient [address] [facility]     Venofer Visit    NAME:  Mauricio Lewis OF BIRTH:  1940  MEDICAL RECORD NUMBER:  [id number]  Episode Date:  8/21/2020    Patient arrived to [facility] 58   [] per wheelchair   [x] ambulatory     Alert and oriented X 4. Denies any side effects from last dose of Venofer. States doesn't feel any different than prior to dose. Instructed re effectiveness. . No new s/s of anemia. Denies new s/s of infection or increase in respiratory distress. Afebrile. Wearing face mask to prevent the spread of COVID-19. Has the patient had a previous problem with Venofer Infusion? No  Any current infection or illness? No   Patient on antibiotics? No   History of Hypertension?  No     /66   Pulse 83   Temp 98 °F (36.7 °C) (Oral)   Resp 16   SpO2 96%   Patient Vitals for the past 2 hrs:   BP Temp Temp src Pulse Resp SpO2   08/21/20 1226 128/66 98 °F (36.7 °C) Oral 83 16 96 %       Is the patient experiencing any:  Fatigue:   []   None  [x]   Increase over baseline but not altering normal activities  []   Moderate of causing difficulty performing some activities  []   Severe or loss of ability to perform some activities  []   Bedridden or disabling     Dizziness or Lightheadedness:   [x]   None  []   No Interference  []   Interferes with functioning but not activities of daily living  []   Interferes with daily activies  []   Bedridden or disabling    Shortness of Breath:   [x]   None   []   Dyspneic on exertion   []   Dyspnea with normal activities  []   Dyspnea at rest    Edema: none Location of edema: nothing    Tachycardia: No    Heart Palpitations: No      Chest Pain: No      /66   Pulse 83   Temp 98 °F (36.7 °C) (Oral)   Resp 16   SpO2 96%   Patient Vitals for the past 2 hrs:   BP Temp Temp src Pulse Resp SpO2   08/21/20 1226 128/66 98 °F (36.7 °C) Oral 83 16 96 %       Current Lab Data:  Hemoglobin/Hematocrit:    Lab Results Component Value Date    HGB 10.1 08/21/2020    HCT 21.9 09/26/2019     IRON:    Lab Results   Component Value Date    IRON 58 05/08/2020     Iron Saturation:    Lab Results   Component Value Date    LABIRON 25 05/08/2020     TIBC:    Lab Results   Component Value Date    TIBC 229 05/08/2020     FERRITIN:    Lab Results   Component Value Date    FERRITIN 123.7 01/06/2020       Dose Administered: 200 mg IVP over 15 minutes  Todays dose is number 2 out of 3 ordered for this patient. (Given while awaiting lab results for epogen dose)    Response to treatment:  Well tolerated by patient. Education:    Verbalized understanding     Scheduled to return for next dose of Venofer on September 4, 2020.      Electronically signed by Genia Denson RN on 8/21/2020 at 1:10 PM    Epogen Visit with Peripheral Lab Draw    Peripheral Lab Draw    Blood Draw Site: Location:right ahand  Site cleansed with Chloroprep Scrub for 30 seconds: Yes  Site cleansed with Alcohol pads: Yes  Labs drawn with: 22 gauge    Angiocath         [] Butterfly    [] Needle  Labs Obtained: Yes  Number of attempts: 1    Lab Test(s) Ordered: Stat HGB    Lab Draw Site:  Redness: No  Bruising: No   Edema: No  Pain: No       Epogen Visit     Is the patient experiencing any:  Fatigue:   []   None  [x]   Increase over baseline but not altering normal activities  []   Moderate of causing difficulty performing some activities  []   Severe or loss of ability to perform some activities  []   Bedridden or disabling    Dizziness or Lightheadedness:   [x]   None  []   No Interference  []   Interferes with functioning but not activities of daily living  []   Interferes with daily activies  []   Bedridden or disabling    Shortness of Breath:   [x]   None   []   Dyspneic on exertion   []   Dyspnea with normal activities  []   Dyspnea at rest    Edema: none Location of edema: nothing    Tachycardia: No    Heart Palpitations: No      Chest Pain: No     /66   Pulse 83 Temp 98 °F (36.7 °C) (Oral)   Resp 16   SpO2 96%     Hold ARANZA dose if B/P is greater than: 180 mm/Hg     If Hgb remains less than 10 Increase dose by 25%. Do not increase dose more frequently than once every 4 weeks. If Hgb 10-10.5 g/dl  Continue same dose  If Hgb 10.6-11 g/dl Decrease dose by 25%. A decrease in dose can be done as frequently as every week. If Hgb is greater than 11 g/dl Hold Epogen. May resume ARANZA when Hgb is less than 10 with a 25% reduction in the dose from the last administered dose or decrease with a 25% reduction in the dose from the last administered dose or decrease  frequency. Last visit date: 8/7/20     Last Hgb: 9.8 g/dl   Last dose: 25,000 units    Current Lab Data:    Recent Labs     08/21/20  1235   HGB 10.1*     Dose will remain the same    Epogen dosage: 25,000 units was administered slowly subcutaneously into the right upper arm. Dose verified by: ANTONINA Humphries RN    Response to treatment:  Well tolerated by patient. Education:    Verbalized understanding    Scheduled to return for next dose of Epogen on September 4, 2020 .      Electronically signed by Samantha Herrmann RN on 8/21/2020 at 12:44 PM

## 2020-09-04 ENCOUNTER — HOSPITAL ENCOUNTER (OUTPATIENT)
Dept: INFUSION THERAPY | Age: 80
Setting detail: INFUSION SERIES
Discharge: HOME | End: 2020-09-04
Payer: MEDICARE

## 2020-09-04 VITALS
DIASTOLIC BLOOD PRESSURE: 68 MMHG | HEART RATE: 81 BPM | OXYGEN SATURATION: 97 % | RESPIRATION RATE: 16 BRPM | SYSTOLIC BLOOD PRESSURE: 126 MMHG | TEMPERATURE: 97.1 F

## 2020-09-04 DIAGNOSIS — N18.4 ANEMIA IN STAGE 4 CHRONIC KIDNEY DISEASE (HCC): Primary | ICD-10-CM

## 2020-09-04 DIAGNOSIS — D63.1 ANEMIA IN STAGE 4 CHRONIC KIDNEY DISEASE (HCC): Primary | ICD-10-CM

## 2020-09-04 DIAGNOSIS — N18.4 CHRONIC KIDNEY DISEASE, STAGE IV (SEVERE) (HCC): ICD-10-CM

## 2020-09-04 LAB — HEMOGLOBIN: 10.1 G/DL (ref 13.5–17.5)

## 2020-09-04 PROCEDURE — 96372 THER/PROPH/DIAG INJ SC/IM: CPT

## 2020-09-04 PROCEDURE — 85018 HEMOGLOBIN: CPT

## 2020-09-04 PROCEDURE — 6360000002 HC RX W HCPCS: Performed by: INTERNAL MEDICINE

## 2020-09-04 PROCEDURE — 96374 THER/PROPH/DIAG INJ IV PUSH: CPT

## 2020-09-04 PROCEDURE — 2580000003 HC RX 258: Performed by: INTERNAL MEDICINE

## 2020-09-04 PROCEDURE — 6360000002 HC RX W HCPCS

## 2020-09-04 RX ORDER — DIPHENHYDRAMINE HYDROCHLORIDE 50 MG/ML
50 INJECTION INTRAMUSCULAR; INTRAVENOUS ONCE
Status: CANCELLED | OUTPATIENT
Start: 2020-09-04

## 2020-09-04 RX ORDER — SODIUM CHLORIDE 0.9 % (FLUSH) 0.9 %
10 SYRINGE (ML) INJECTION PRN
Status: CANCELLED | OUTPATIENT
Start: 2020-09-04

## 2020-09-04 RX ORDER — SODIUM CHLORIDE 9 MG/ML
INJECTION, SOLUTION INTRAVENOUS CONTINUOUS
Status: CANCELLED | OUTPATIENT
Start: 2020-09-04

## 2020-09-04 RX ORDER — EPINEPHRINE 1 MG/ML
0.3 INJECTION, SOLUTION, CONCENTRATE INTRAVENOUS PRN
Status: CANCELLED | OUTPATIENT
Start: 2020-09-04

## 2020-09-04 RX ORDER — SODIUM CHLORIDE 0.9 % (FLUSH) 0.9 %
10 SYRINGE (ML) INJECTION PRN
Status: DISCONTINUED | OUTPATIENT
Start: 2020-09-04 | End: 2020-09-05 | Stop reason: HOSPADM

## 2020-09-04 RX ORDER — MEPERIDINE HYDROCHLORIDE 25 MG/ML
12.5 INJECTION INTRAMUSCULAR; INTRAVENOUS; SUBCUTANEOUS ONCE
Status: CANCELLED | OUTPATIENT
Start: 2020-09-04

## 2020-09-04 RX ORDER — METHYLPREDNISOLONE SODIUM SUCCINATE 125 MG/2ML
125 INJECTION, POWDER, LYOPHILIZED, FOR SOLUTION INTRAMUSCULAR; INTRAVENOUS ONCE
Status: CANCELLED | OUTPATIENT
Start: 2020-09-04

## 2020-09-04 RX ADMIN — SODIUM CHLORIDE, PRESERVATIVE FREE 10 ML: 5 INJECTION INTRAVENOUS at 13:00

## 2020-09-04 RX ADMIN — EPOETIN ALFA-EPBX 25000 UNITS: 40000 INJECTION, SOLUTION INTRAVENOUS; SUBCUTANEOUS at 13:20

## 2020-09-04 RX ADMIN — SODIUM CHLORIDE, PRESERVATIVE FREE 10 ML: 5 INJECTION INTRAVENOUS at 12:37

## 2020-09-04 RX ADMIN — IRON SUCROSE 200 MG: 20 INJECTION, SOLUTION INTRAVENOUS at 12:41

## 2020-09-04 ASSESSMENT — PAIN SCALES - GENERAL: PAINLEVEL_OUTOF10: 0

## 2020-09-04 NOTE — PROGRESS NOTES
[facility]     Epogen Visit with Peripheral Lab Draw    NAME:  Viviane Huang OF BIRTH:  1940  MEDICAL RECORD NUMBER:  [id number]  Episode Date:  9/4/2020    Patient arrived to [facility] 58   [] per wheelchair   [x] ambulatory    Alert and oriented X 4. Denies any side effects from last infusion. Denies any new s/s of anemia. States is feeling better than he has been. Has increased energy. Denies new s/s of infection or increase in respiratory distress. Afebrile. Wearing face mask to prevent the spread of COVID-19. Peripheral Lab Draw    Blood Draw Site: Location:right dorsal wrist  Site cleansed with Chloroprep Scrub for 30 seconds: Yes  Site cleansed with Alcohol pads: Yes  Labs drawn with: 22 gauge  Angiocath           [] Butterfly    [] Needle  Labs Obtained: Yes  Number of attempts: 2, Both per HAJA Jewell RN    Lab Test(s) Ordered: Stat HGB    Lab Draw Site:  Redness: No  Bruising: No   Edema: No  Pain: No       Epogen Visit     Is the patient experiencing any:  Fatigue: States is decreasing   []   None  [x]   Increase over baseline but not altering normal activities  []   Moderate of causing difficulty performing some activities  []   Severe or loss of ability to perform some activities  []   Bedridden or disabling    Dizziness or Lightheadedness:   [x]   None  []   No Interference  []   Interferes with functioning but not activities of daily living  []   Interferes with daily activies  []   Bedridden or disabling    Shortness of Breath:   [x]   None   []   Dyspneic on exertion   []   Dyspnea with normal activities  []   Dyspnea at rest    Edema: none Location of edema: nothing    Tachycardia: No    Heart Palpitations: No      Chest Pain: No     /68   Pulse 81   Temp 97.1 °F (36.2 °C) (Oral)   Resp 16   SpO2 97%     Hold ARANZA dose if B/P is greater than: 180 mm/Hg     If Hgb remains less than 10 Increase dose by 25%.   Do not increase dose more frequently than once every 4 weeks. If Hgb 10-10.5 g/dl  Continue same dose  If Hgb 10.6-11 g/dl Decrease dose by 25%. A decrease in dose can be done as frequently as every week. If Hgb is greater than 11 g/dl Hold Epogen. May resume ARANZA when Hgb is less than 10 with a 25% reduction in the dose from the last administered dose or decrease with a 25% reduction in the dose from the last administered dose or decrease  frequency. Last visit date: 8/21/20     Last Hgb:  10.1 g/dl   Last dose: 25,000 units    Current Lab Data:    Recent Labs     09/04/20  1238   HGB 10.1*     Dose will remain the same    Epogen dosage: 25,000 units was administered slowly subcutaneously into the right upper arm. Dose verified by: HAJA Claire RN    Response to treatment:  Well tolerated by patient. Education:    Verbalized understanding    Scheduled to return for next dose of Epogen on September 18, 2020 . Electronically signed by Marv Cabezas RN on 9/4/2020 at 3:37 PM  Outpatient [id number] [place] [address]     Venofer Visit      Has the patient had a previous problem with Venofer Infusion? No  Any current infection or illness? No   Patient on antibiotics? No   History of Hypertension? No     /68   Pulse 81   Temp 97.1 °F (36.2 °C) (Oral)   Resp 16   SpO2 97%   No data found.     Is the patient experiencing any:  Fatigue: Decreasing  []   None  [x]   Increase over baseline but not altering normal activities  []   Moderate of causing difficulty performing some activities  []   Severe or loss of ability to perform some activities  []   Bedridden or disabling     Dizziness or Lightheadedness:   [x]   None  []   No Interference  []   Interferes with functioning but not activities of daily living  []   Interferes with daily activies  []   Bedridden or disabling    Shortness of Breath:   [x]   None   []   Dyspneic on exertion   []   Dyspnea with normal activities  []   Dyspnea at rest    Edema: none Location of edema: nothing    Tachycardia: No    Heart Palpitations: No      Chest Pain: No      /68   Pulse 81   Temp 97.1 °F (36.2 °C) (Oral)   Resp 16   SpO2 97%   No data found. Current Lab Data:  Hemoglobin/Hematocrit:    Lab Results   Component Value Date    HGB 10.1 09/04/2020    HCT 21.9 09/26/2019     IRON:    Lab Results   Component Value Date    IRON 58 05/08/2020     Iron Saturation:    Lab Results   Component Value Date    LABIRON 25 05/08/2020     TIBC:    Lab Results   Component Value Date    TIBC 229 05/08/2020     FERRITIN:    Lab Results   Component Value Date    FERRITIN 123.7 01/06/2020       Dose Administered: 200 mg  Todays dose is number 3 out of 3 ordered for this patient. Given slow IVP over 10 minutes. While waiting for HGB results    Response to treatment:  Well tolerated by patient. Education:    Verbalized understanding     Scheduled to return for next dose of Venofer on . ., N/A, today is the last dose. Will return on 9/18/20 for epogen and will recheck Iron studies.  .     Electronically signed by Edward Harrell RN on 9/4/2020 at 3:37 PM

## 2020-09-18 ENCOUNTER — HOSPITAL ENCOUNTER (OUTPATIENT)
Dept: INFUSION THERAPY | Age: 80
Setting detail: INFUSION SERIES
Discharge: HOME | End: 2020-09-18
Payer: MEDICARE

## 2020-09-18 VITALS
TEMPERATURE: 98.3 F | OXYGEN SATURATION: 97 % | SYSTOLIC BLOOD PRESSURE: 135 MMHG | HEART RATE: 82 BPM | DIASTOLIC BLOOD PRESSURE: 63 MMHG | RESPIRATION RATE: 16 BRPM

## 2020-09-18 DIAGNOSIS — N18.4 ANEMIA IN STAGE 4 CHRONIC KIDNEY DISEASE (HCC): Primary | ICD-10-CM

## 2020-09-18 DIAGNOSIS — N18.4 CHRONIC KIDNEY DISEASE, STAGE IV (SEVERE) (HCC): ICD-10-CM

## 2020-09-18 DIAGNOSIS — D63.1 ANEMIA IN STAGE 4 CHRONIC KIDNEY DISEASE (HCC): Primary | ICD-10-CM

## 2020-09-18 LAB
FERRITIN: 283.7 NG/ML (ref 30–400)
HEMOGLOBIN: 10.3 G/DL (ref 13.5–17.5)
IRON SATURATION: 34 % (ref 20–50)
IRON: 69 UG/DL (ref 59–158)
TOTAL IRON BINDING CAPACITY: 203 UG/DL (ref 260–445)

## 2020-09-18 PROCEDURE — 82728 ASSAY OF FERRITIN: CPT

## 2020-09-18 PROCEDURE — 83540 ASSAY OF IRON: CPT

## 2020-09-18 PROCEDURE — 6360000002 HC RX W HCPCS

## 2020-09-18 PROCEDURE — 85018 HEMOGLOBIN: CPT

## 2020-09-18 PROCEDURE — 6360000002 HC RX W HCPCS: Performed by: INTERNAL MEDICINE

## 2020-09-18 PROCEDURE — 96372 THER/PROPH/DIAG INJ SC/IM: CPT

## 2020-09-18 PROCEDURE — 83550 IRON BINDING TEST: CPT

## 2020-09-18 RX ADMIN — EPOETIN ALFA-EPBX 25000 UNITS: 40000 INJECTION, SOLUTION INTRAVENOUS; SUBCUTANEOUS at 13:13

## 2020-09-18 NOTE — PROGRESS NOTES
[address] [facility]     Epogen Visit with Peripheral Lab Draw    NAME:  Humberto Spain OF BIRTH:  1940  MEDICAL RECORD NUMBER:  [id number]  Episode Date:  9/18/2020    Patient arrived to [facility] 58   [] per wheelchair   [x] ambulatory     Alert and oriented X 4. Denies new s/s of anemia. Denies side effects from last injection. Saw Dr. Salvador Massey last Monday and states his numbers are stable and will return in November. He states MD  re-evaluates each time if he goes above \"16\" he will start dialysis. States he has remained at 16 for several months. Denies new s/s of infection or increase in respiratory distress. Afebrile. Wearing face mask to prevent the spread of COVID-19    Peripheral Lab Draw    Blood Draw Site: Location:right arm antecubital  Site cleansed with Chloroprep Scrub for 30 seconds: Yes  Site cleansed with Alcohol pads: Yes  Labs drawn with: 23 gauge             [x] Butterfly    [] Needle  Labs Obtained: Yes  Number of attempts: 1    Lab Test(s) Ordered: Stat HGB and Iron, TIBC, Ferritin.     Lab Draw Site:  Redness: No  Bruising: No   Edema: No  Pain: No       Epogen Visit     Is the patient experiencing any:  Fatigue:  []   None  [x]   Increase over baseline but not altering normal activities  []   Moderate of causing difficulty performing some activities  []   Severe or loss of ability to perform some activities  []   Bedridden or disabling    Dizziness or Lightheadedness:   []   None  [x]   No Interference Occasional when first rises in AM or if gets up too fast from a seated position  []   Interferes with functioning but not activities of daily living  []   Interferes with daily activies  []   Bedridden or disabling    Shortness of Breath:   [x]   None   []   Dyspneic on exertion   []   Dyspnea with normal activities  []   Dyspnea at rest    Edema: none Location of edema: nothing    Tachycardia: No    Heart Palpitations: No      Chest Pain: No     /63   Pulse 82   Temp 98.3 °F (36.8 °C) (Oral)   Resp 16   SpO2 97%     Hold ARANZA dose if B/P is greater than: 180 mm/Hg     If Hgb remains less than 10 Increase dose by 25%. Do not increase dose more frequently than once every 4 weeks. If Hgb 10-10.5 g/dl  Continue same dose  If Hgb 10.6-11 g/dl Decrease dose by 25%. A decrease in dose can be done as frequently as every week. If Hgb is greater than 11 g/dl Hold Epogen. May resume ARANZA when Hgb is less than 10 with a 25% reduction in the dose from the last administered dose or decrease with a 25% reduction in the dose from the last administered dose or decrease  frequency. Last visit date: 9/4/20     Last Hgb: 10.1 g/dl   Last dose: 25,000 units    Current Lab Data:    Recent Labs     09/18/20  1254   HGB 10.3*     Dose will remain the same    Epogen dosage: 32418 units was administered slowly subcutaneously into the right upper arm. Dose verified by: SADE Singletary RN    Response to treatment:  Well tolerated by patient. Education:    Verbalized understanding    Scheduled to return for next dose of Epogen on October 2, 2020 .      Electronically signed by Hari Mack RN on 9/18/2020 at 1:08 PM

## 2020-10-02 ENCOUNTER — HOSPITAL ENCOUNTER (OUTPATIENT)
Dept: INFUSION THERAPY | Age: 80
Setting detail: INFUSION SERIES
Discharge: HOME | End: 2020-10-02
Payer: MEDICARE

## 2020-10-02 VITALS
SYSTOLIC BLOOD PRESSURE: 129 MMHG | HEART RATE: 77 BPM | RESPIRATION RATE: 16 BRPM | OXYGEN SATURATION: 97 % | TEMPERATURE: 98 F | DIASTOLIC BLOOD PRESSURE: 63 MMHG

## 2020-10-02 DIAGNOSIS — N18.4 CHRONIC KIDNEY DISEASE, STAGE IV (SEVERE) (HCC): ICD-10-CM

## 2020-10-02 DIAGNOSIS — N18.4 ANEMIA IN STAGE 4 CHRONIC KIDNEY DISEASE (HCC): Primary | ICD-10-CM

## 2020-10-02 DIAGNOSIS — D63.1 ANEMIA IN STAGE 4 CHRONIC KIDNEY DISEASE (HCC): Primary | ICD-10-CM

## 2020-10-02 LAB — HEMOGLOBIN: 10.2 G/DL (ref 13.5–17.5)

## 2020-10-02 PROCEDURE — 85018 HEMOGLOBIN: CPT

## 2020-10-02 PROCEDURE — 96372 THER/PROPH/DIAG INJ SC/IM: CPT

## 2020-10-02 PROCEDURE — 6360000002 HC RX W HCPCS: Performed by: INTERNAL MEDICINE

## 2020-10-02 RX ADMIN — EPOETIN ALFA-EPBX 25000 UNITS: 10000 INJECTION, SOLUTION INTRAVENOUS; SUBCUTANEOUS at 13:21

## 2020-10-02 NOTE — PROGRESS NOTES
[address] [facility]     Epogen Visit with Peripheral Lab Draw    NAME:  Ronni Carpenter OF BIRTH:  1940  MEDICAL RECORD NUMBER:  [id number]  Episode Date:  10/2/2020    Patient arrived to [facility] 58   [] per wheelchair   [x] ambulatory    Alert and oriented X 4. Denies any new s/s of anemia or side effects from last injection. Denies new s/s of infection or increase in respiratory distress. Afebrile. Wearing face mask to prevent the spread of COVID-19. States received his seasonal flu shot this year from [facility]    Peripheral Lab Draw    Blood Draw Site: Location:right arm  Site cleansed with Chloroprep Scrub for 30 seconds: Yes  Site cleansed with Alcohol pads: Yes  Labs drawn with: 23 gauge             [x] Butterfly    [] Needle  Labs Obtained: Yes  Number of attempts: 1    Lab Test(s) Ordered: Stat Hgb    Lab Draw Site:  Redness: No  Bruising: No   Edema: No  Pain: No       Epogen Visit     Is the patient experiencing any:  Fatigue:   []   None  [x]   Increase over baseline but not altering normal activities  []   Moderate of causing difficulty performing some activities  []   Severe or loss of ability to perform some activities  []   Bedridden or disabling    Dizziness or Lightheadedness:   [x]   None  []   No Interference  []   Interferes with functioning but not activities of daily living  []   Interferes with daily activies  []   Bedridden or disabling    Shortness of Breath:   [x]   None   []   Dyspneic on exertion   []   Dyspnea with normal activities  []   Dyspnea at rest    Edema: none Location of edema: nothing    Tachycardia: No    Heart Palpitations: No      Chest Pain: No     /63   Pulse 77   Temp 98 °F (36.7 °C) (Oral)   Resp 16   SpO2 97%     Hold ARANZA dose if B/P is greater than: 180 mm/Hg     If Hgb remains less than 10 Increase dose by 25%. Do not increase dose more frequently than once every 4 weeks.   If Hgb 10-10.5 g/dl Continue same dose  If Hgb 10.6-11 g/dl Decrease dose by 25%. A decrease in dose can be done as frequently as every week. If Hgb is greater than 11 g/dl Hold Epogen. May resume ARANZA when Hgb is less than 10 with a 25% reduction in the dose from the last administered dose or decrease with a 25% reduction in the dose from the last administered dose or decrease  frequency. Last visit date: 9/18/20     Last Hgb: 10.3 g/dl   Last dose: 25,000 units    Current Lab Data:    Recent Labs     10/02/20  1245   HGB 10.2*     Dose will remain the same    Epogen dosage: 25,000 units was administered slowly subcutaneously into the right upper arm. Dose verified by: BEBETO Hernandez RN    Response to treatment:  Well tolerated by patient. Education:    Verbalized understanding    Scheduled to return for next dose of Epogen on October 16, 2020 .      Electronically signed by Arely Zapata RN on 10/2/2020 at 1:33 PM

## 2020-10-16 ENCOUNTER — HOSPITAL ENCOUNTER (OUTPATIENT)
Dept: INFUSION THERAPY | Age: 80
Setting detail: INFUSION SERIES
Discharge: HOME | End: 2020-10-16
Payer: MEDICARE

## 2020-10-16 VITALS
OXYGEN SATURATION: 97 % | HEART RATE: 80 BPM | SYSTOLIC BLOOD PRESSURE: 144 MMHG | DIASTOLIC BLOOD PRESSURE: 73 MMHG | TEMPERATURE: 97.5 F | RESPIRATION RATE: 16 BRPM

## 2020-10-16 DIAGNOSIS — N18.4 ANEMIA IN STAGE 4 CHRONIC KIDNEY DISEASE (HCC): Primary | ICD-10-CM

## 2020-10-16 DIAGNOSIS — N18.4 CHRONIC KIDNEY DISEASE, STAGE IV (SEVERE) (HCC): ICD-10-CM

## 2020-10-16 DIAGNOSIS — D63.1 ANEMIA IN STAGE 4 CHRONIC KIDNEY DISEASE (HCC): Primary | ICD-10-CM

## 2020-10-16 LAB — HEMOGLOBIN: 10.8 G/DL (ref 13.5–17.5)

## 2020-10-16 PROCEDURE — 6360000002 HC RX W HCPCS: Performed by: INTERNAL MEDICINE

## 2020-10-16 PROCEDURE — 85018 HEMOGLOBIN: CPT

## 2020-10-16 PROCEDURE — 96372 THER/PROPH/DIAG INJ SC/IM: CPT

## 2020-10-16 RX ADMIN — EPOETIN ALFA-EPBX 19000 UNITS: 10000 INJECTION, SOLUTION INTRAVENOUS; SUBCUTANEOUS at 14:15

## 2020-10-16 ASSESSMENT — PAIN SCALES - GENERAL: PAINLEVEL_OUTOF10: 0

## 2020-10-16 NOTE — PROGRESS NOTES
[address] [facility]     Epogen Visit with Peripheral Lab Draw    NAME:  Geno Whitehead OF BIRTH:  1940  MEDICAL RECORD NUMBER:  [id number]  Episode Date:  10/16/2020    Patient arrived to [facility] 58   [] per wheelchair   [x] ambulatory    Peripheral Lab Draw    Blood Draw Site: Location:right arm  Site cleansed with Chloroprep Scrub for 30 seconds: Yes  Site cleansed with Alcohol pads: Yes  Labs drawn with: 25 gauge             [x] Butterfly    [] Needle  Labs Obtained: Yes  Number of attempts: 2    Lab Test(s) Ordered: HGB    Lab Draw Site:  Redness: No  Bruising: No   Edema: No  Pain: No       Epogen Visit     Is the patient experiencing any:  Fatigue:   [x]   None  []   Increase over baseline but not altering normal activities  []   Moderate of causing difficulty performing some activities  []   Severe or loss of ability to perform some activities  []   Bedridden or disabling    Dizziness or Lightheadedness:   [x]   None  []   No Interference  []   Interferes with functioning but not activities of daily living  []   Interferes with daily activies  []   Bedridden or disabling    Shortness of Breath:   []   None   [x]   Dyspneic on exertion up and down steps  []   Dyspnea with normal activities  []   Dyspnea at rest    Edema: none     Tachycardia: No    Heart Palpitations: No      Chest Pain: No     BP (!) 144/73   Pulse 80   Temp 97.5 °F (36.4 °C) (Oral)   Resp 16   SpO2 97%     Hold ARANZA dose if B/P is greater than: 180 mm/Hg     If Hgb remains less than 10 Increase dose by 25%. Do not increase dose more frequently than once every 4 weeks. If Hgb 10-10.5 g/dl  Continue same dose  If Hgb 10.6-11 g/dl Decrease dose by 25%. A decrease in dose can be done as frequently as every week. If Hgb is greater than 11 g/dl Hold Epogen.   May resume ARANZA when Hgb is less than 10 with a 25% reduction in the dose from the last administered dose or decrease with a 25% reduction in the dose from the last administered dose or decrease  frequency. Last visit date: 10/2/20     Last Hgb: 10.2 g/dl   Last dose: 90818 units    Current Lab Data:    Recent Labs     10/16/20  1330   HGB 10.8*     Dose will be decreased    Epogen dosage: 56003 units was administered slowly subcutaneously into the right upper arm. Dose verified by: Gilberto Vincent RN    Response to treatment:  Well tolerated by patient. Education:    Verbalized understanding    Scheduled to return for next dose of Epogen on October 30, 2020 .      Electronically signed by Rima Cummings RN on 10/16/2020 at 2:23 PM

## 2020-10-30 ENCOUNTER — HOSPITAL ENCOUNTER (OUTPATIENT)
Dept: INFUSION THERAPY | Age: 80
Setting detail: INFUSION SERIES
Discharge: HOME | End: 2020-10-30
Payer: MEDICARE

## 2020-10-30 VITALS
DIASTOLIC BLOOD PRESSURE: 64 MMHG | WEIGHT: 191.8 LBS | TEMPERATURE: 97.8 F | OXYGEN SATURATION: 97 % | RESPIRATION RATE: 18 BRPM | SYSTOLIC BLOOD PRESSURE: 135 MMHG | HEART RATE: 84 BPM

## 2020-10-30 DIAGNOSIS — N18.4 CHRONIC KIDNEY DISEASE, STAGE IV (SEVERE) (HCC): ICD-10-CM

## 2020-10-30 DIAGNOSIS — D63.1 ANEMIA IN STAGE 4 CHRONIC KIDNEY DISEASE (HCC): Primary | ICD-10-CM

## 2020-10-30 DIAGNOSIS — N18.4 ANEMIA IN STAGE 4 CHRONIC KIDNEY DISEASE (HCC): Primary | ICD-10-CM

## 2020-10-30 LAB — HEMOGLOBIN: 10.4 G/DL (ref 13.5–17.5)

## 2020-10-30 PROCEDURE — 96372 THER/PROPH/DIAG INJ SC/IM: CPT

## 2020-10-30 PROCEDURE — 6360000002 HC RX W HCPCS: Performed by: INTERNAL MEDICINE

## 2020-10-30 PROCEDURE — 85018 HEMOGLOBIN: CPT

## 2020-10-30 PROCEDURE — 6360000002 HC RX W HCPCS

## 2020-10-30 RX ADMIN — EPOETIN ALFA-EPBX 19000 UNITS: 10000 INJECTION, SOLUTION INTRAVENOUS; SUBCUTANEOUS at 13:10

## 2020-10-30 NOTE — PROGRESS NOTES
[address] \[facility]\""     Epogen Visit with Peripheral Lab Draw    NAME:  Juliana Blair  YOB: 1940  MEDICAL RECORD NUMBER:  [id number]  Episode Date:  10/30/2020    Patient arrived to [facility] 58   [] per wheelchair   [x] ambulatory    Peripheral Lab Draw    Blood Draw Site: Location:right arm  Site cleansed with Chloroprep Scrub for 30 seconds: Yes  Site cleansed with Alcohol pads: No:   Labs drawn with: 23 gauge             [x] Butterfly    [] Needle  Labs Obtained: Yes  Number of attempts: 1    Lab Test(s) Ordered: HGB    Lab Draw Site:  Redness: No  Bruising: No   Edema: No  Pain: No       Epogen Visit     Is the patient experiencing any:  Fatigue:   [x]   None  []   Increase over baseline but not altering normal activities  []   Moderate of causing difficulty performing some activities  []   Severe or loss of ability to perform some activities  []   Bedridden or disabling    Dizziness or Lightheadedness:   [x]   None  []   No Interference  []   Interferes with functioning but not activities of daily living  []   Interferes with daily activies  []   Bedridden or disabling    Shortness of Breath:   []   None   [x]   Dyspneic on exertion   []   Dyspnea with normal activities  []   Dyspnea at rest    Edema: none Location of edema: nothing    Tachycardia: No    Heart Palpitations: No      Chest Pain: No     /64   Pulse 84   Temp 97.8 °F (36.6 °C) (Oral)   Resp 18   Wt 191 lb 12.8 oz (87 kg)   SpO2 97%     Hold ARANZA dose if B/P is greater than: 180 mm/Hg     If Hgb remains less than 10 Increase dose by 25%. Do not increase dose more frequently than once every 4 weeks. If Hgb 10-10.5 g/dl  Continue same dose  If Hgb 10.6-11 g/dl Decrease dose by 25%. A decrease in dose can be done as frequently as every week. If Hgb is greater than 11 g/dl Hold Epogen.   May resume ARANZA when Hgb is less than 10 with a 25% reduction in the dose from the last administered dose or decrease with a 25% reduction in the dose from the last administered dose or decrease  frequency. Last visit date: 10/16/2020     Last Hgb: 10.8 g/dl   Last dose: 19,000 units    Current Lab Data:    Recent Labs     10/30/20  1230   HGB 10.4*     Dose will be the same as last time    Epogen dosage: 19,000 units was administered slowly subcutaneously into the right upper arm. Dose verified by: Chalino Dobbs RN    Response to treatment:  Well tolerated by patient. Education:    Indicates understanding    Scheduled to return for next dose of Epogen on November 13, 2020    Patient to see Dr. Sophy Birch .      Electronically signed by Tamika Maguire RN on 10/30/2020 at 12:50 PM

## 2020-11-20 ENCOUNTER — HOSPITAL ENCOUNTER (OUTPATIENT)
Dept: INFUSION THERAPY | Age: 80
Setting detail: INFUSION SERIES
Discharge: HOME | End: 2020-11-20
Payer: MEDICARE

## 2020-11-20 VITALS
HEIGHT: 71 IN | OXYGEN SATURATION: 97 % | WEIGHT: 192.02 LBS | HEART RATE: 91 BPM | TEMPERATURE: 97.7 F | SYSTOLIC BLOOD PRESSURE: 146 MMHG | RESPIRATION RATE: 18 BRPM | DIASTOLIC BLOOD PRESSURE: 84 MMHG | BODY MASS INDEX: 26.88 KG/M2

## 2020-11-20 DIAGNOSIS — N18.4 CHRONIC KIDNEY DISEASE, STAGE IV (SEVERE) (HCC): ICD-10-CM

## 2020-11-20 DIAGNOSIS — N18.4 ANEMIA IN STAGE 4 CHRONIC KIDNEY DISEASE (HCC): Primary | ICD-10-CM

## 2020-11-20 DIAGNOSIS — D63.1 ANEMIA IN STAGE 4 CHRONIC KIDNEY DISEASE (HCC): Primary | ICD-10-CM

## 2020-11-20 LAB — HEMOGLOBIN: 9.5 G/DL (ref 13.5–17.5)

## 2020-11-20 PROCEDURE — 85018 HEMOGLOBIN: CPT

## 2020-11-20 PROCEDURE — 96372 THER/PROPH/DIAG INJ SC/IM: CPT

## 2020-11-20 PROCEDURE — 6360000002 HC RX W HCPCS

## 2020-11-20 RX ADMIN — EPOETIN ALFA-EPBX 20000 UNITS: 10000 INJECTION, SOLUTION INTRAVENOUS; SUBCUTANEOUS at 13:50

## 2020-11-20 RX ADMIN — EPOETIN ALFA-EPBX 4000 UNITS: 4000 INJECTION, SOLUTION INTRAVENOUS; SUBCUTANEOUS at 13:51

## 2020-11-20 ASSESSMENT — PAIN SCALES - GENERAL: PAINLEVEL_OUTOF10: 0

## 2020-11-20 NOTE — PROGRESS NOTES
[address] [facility]     Epogen Visit with Peripheral Lab Draw    NAME:  Geno Whitehead OF BIRTH:  1940  MEDICAL RECORD NUMBER:  [id number]  Episode Date:  11/20/2020    Patient arrived to [facility] 58   [] per wheelchair   [x] ambulatory    Peripheral Lab Draw    Blood Draw Site: Location: right upper forearm  Site cleansed with Chloroprep Scrub for 30 seconds: Yes  Labs drawn with: 23 gauge             [x] Butterfly    [] Needle  Labs Obtained: Yes  Number of attempts: 1    Lab Test(s) Ordered: STAT Hemoglobin    Lab Draw Site:  Redness: No  Bruising: No   Edema: No  Pain: No       Epogen Visit     Is the patient experiencing any:  Fatigue:   []   None  [x]   Increase over baseline but not altering normal activities - Patient reports that he is not tired during the day but by the evening, \"I am ready for bed\"  []   Moderate of causing difficulty performing some activities  []   Severe or loss of ability to perform some activities  []   Bedridden or disabling    Dizziness or Lightheadedness:   [x]   None  []   No Interference  []   Interferes with functioning but not activities of daily living  []   Interferes with daily activies  []   Bedridden or disabling    Shortness of Breath:   []   None   [x]   Dyspneic on exertion - when climbing stairs   []   Dyspnea with normal activities  []   Dyspnea at rest    Edema: None noted    Tachycardia: No    Heart Palpitations: No      Chest Pain: No     BP (!) 146/84   Pulse 91   Temp 97.7 °F (36.5 °C) (Oral)   Resp 18   Ht 5' 11\" (1.803 m)   Wt 192 lb 0.3 oz (87.1 kg)   SpO2 97%   BMI 26.78 kg/m²     Hold ARANZA dose if B/P is greater than: 180 mm/Hg     If Hgb remains less than 10 Increase dose by 25%. Do not increase dose more frequently than once every 4 weeks. If Hgb 10-10.5 g/dl  Continue same dose  If Hgb 10.6-11 g/dl Decrease dose by 25%. A decrease in dose can be done as frequently as every week.   If Hgb is greater than 11 g/dl Hold Epogen. May resume ARANZA when Hgb is less than 10 with a 25% reduction in the dose from the last administered dose or decrease with a 25% reduction in the dose from the last administered dose or decrease  frequency. Last visit date: 10/30/2020     Last Hgb: 10.4 g/dl   Last dose: 19,000 units    Current Lab Data:    Recent Labs     11/20/20  1255   HGB 9.5*     Dose will be increased by 25% per therapy plan    Epogen dosage: 24,000 units was administered per 2 separate injections (1 injection of 20,000 units and 1 injection of 4,000 units) slowly subcutaneously into the right upper arm. Dose verified by:  Jeny Solis RN    Response to treatment:  Well tolerated by patient. Education:    Verbal and written discharge instructions reviewed with patient. Verbalized understanding. Written copy given via AVS    Scheduled to return for next dose of Epogen on December 4, 2020 .      Electronically signed by Tammy Devine RN on 11/20/2020 at 4:23 PM

## 2020-12-04 ENCOUNTER — HOSPITAL ENCOUNTER (OUTPATIENT)
Dept: INFUSION THERAPY | Age: 80
Setting detail: INFUSION SERIES
Discharge: HOME | End: 2020-12-04
Payer: MEDICARE

## 2020-12-04 VITALS
SYSTOLIC BLOOD PRESSURE: 151 MMHG | HEART RATE: 80 BPM | OXYGEN SATURATION: 98 % | RESPIRATION RATE: 18 BRPM | TEMPERATURE: 97.9 F | DIASTOLIC BLOOD PRESSURE: 67 MMHG

## 2020-12-04 DIAGNOSIS — D63.1 ANEMIA IN STAGE 4 CHRONIC KIDNEY DISEASE (HCC): Primary | ICD-10-CM

## 2020-12-04 DIAGNOSIS — N18.4 CHRONIC KIDNEY DISEASE, STAGE IV (SEVERE) (HCC): ICD-10-CM

## 2020-12-04 DIAGNOSIS — N18.4 ANEMIA IN STAGE 4 CHRONIC KIDNEY DISEASE (HCC): Primary | ICD-10-CM

## 2020-12-04 LAB
FERRITIN: 228.2 NG/ML (ref 30–400)
IRON SATURATION: 32 % (ref 20–50)
IRON: 69 UG/DL (ref 59–158)
TOTAL IRON BINDING CAPACITY: 218 UG/DL (ref 260–445)

## 2020-12-04 PROCEDURE — 96372 THER/PROPH/DIAG INJ SC/IM: CPT

## 2020-12-04 PROCEDURE — 82728 ASSAY OF FERRITIN: CPT

## 2020-12-04 PROCEDURE — 83550 IRON BINDING TEST: CPT

## 2020-12-04 PROCEDURE — 6360000002 HC RX W HCPCS: Performed by: INTERNAL MEDICINE

## 2020-12-04 PROCEDURE — 83540 ASSAY OF IRON: CPT

## 2020-12-04 RX ADMIN — EPOETIN ALFA-EPBX 4000 UNITS: 4000 INJECTION, SOLUTION INTRAVENOUS; SUBCUTANEOUS at 13:30

## 2020-12-04 RX ADMIN — EPOETIN ALFA-EPBX 10000 UNITS: 10000 INJECTION, SOLUTION INTRAVENOUS; SUBCUTANEOUS at 13:30

## 2020-12-04 NOTE — PROGRESS NOTES
Outpatient [address] [facility]     Epogen Visit with Peripheral Lab Draw    NAME:  Rocío Cummings  YOB: 1940  MEDICAL RECORD NUMBER:  [id number]  Episode Date:  12/4/2020    Patient arrived to [facility] 58   [] per wheelchair   [x] ambulatory    Peripheral Lab Draw    Blood Draw Site: Location:right arm  Site cleansed with Chloroprep Scrub for 30 seconds: Yes  Site cleansed with Alcohol pads: No:   Labs drawn with: 23 gauge             [x] Butterfly    [] Needle  Labs Obtained: Yes  Number of attempts: 1    Lab Test(s) Ordered: Iron studies, ferritin    Lab Draw Site:  Redness: No  Bruising: No   Edema: No  Pain: No       Epogen Visit     Is the patient experiencing any:  Fatigue:   []   None  [x]   Increase over baseline but not altering normal activities  []   Moderate of causing difficulty performing some activities  []   Severe or loss of ability to perform some activities  []   Bedridden or disabling    Dizziness or Lightheadedness:   [x]   None  []   No Interference  []   Interferes with functioning but not activities of daily living  []   Interferes with daily activies  []   Bedridden or disabling    Shortness of Breath:   []   None   [x]   Dyspneic on exertion   []   Dyspnea with normal activities  []   Dyspnea at rest    Edema: none Location of edema: nothing    Tachycardia: No    Heart Palpitations: No      Chest Pain: No     BP (!) 151/67   Pulse 80   Temp 97.9 °F (36.6 °C) (Oral)   Resp 18   SpO2 98%     Hold ARANZA dose if B/P is greater than: 180 mm/Hg     If Hgb remains less than 10 Increase dose by 25%. Do not increase dose more frequently than once every 4 weeks. If Hgb 10-10.5 g/dl  Continue same dose  If Hgb 10.6-11 g/dl Decrease dose by 25%. A decrease in dose can be done as frequently as every week. If Hgb is greater than 11 g/dl Hold Epogen.   May resume ARANZA when Hgb is less than 10 with a 25% reduction in the dose from the last administered dose or decrease with a 25% reduction in the dose from the last administered dose or decrease  frequency. Last visit date: 11/20/2020   Last Hgb: 9.5 g/dl   Last dose: 24,000 units units    Current Lab Data:    HGB 9.9 on 12/2/2020  Dose will be remain the same    Epogen dosage: 24,000 units divided into 2 syringes was administered slowly subcutaneously into right upper arm. Patient preferred both injections to be given in same arm. Dose verified by:  Rasta Head RN    Response to treatment:  Well tolerated by patient. Education:    Indicates understanding    Patient is seeing Dr. Paddy Herrera next week and had labs done 12/2/2020 at [facility]. Scheduled to return for next dose of Epogen on December 18, 2020 .      Electronically signed by Loraine Barron RN on 12/4/2020 at 1:15 PM

## 2020-12-18 ENCOUNTER — HOSPITAL ENCOUNTER (OUTPATIENT)
Dept: INFUSION THERAPY | Age: 80
Setting detail: INFUSION SERIES
Discharge: HOME | End: 2020-12-18
Payer: MEDICARE

## 2020-12-18 VITALS
DIASTOLIC BLOOD PRESSURE: 68 MMHG | OXYGEN SATURATION: 98 % | HEART RATE: 81 BPM | SYSTOLIC BLOOD PRESSURE: 146 MMHG | RESPIRATION RATE: 16 BRPM | TEMPERATURE: 97.5 F

## 2020-12-18 DIAGNOSIS — D63.1 ANEMIA IN STAGE 4 CHRONIC KIDNEY DISEASE (HCC): Primary | ICD-10-CM

## 2020-12-18 DIAGNOSIS — N18.4 ANEMIA IN STAGE 4 CHRONIC KIDNEY DISEASE (HCC): Primary | ICD-10-CM

## 2020-12-18 DIAGNOSIS — N18.4 CHRONIC KIDNEY DISEASE, STAGE IV (SEVERE) (HCC): ICD-10-CM

## 2020-12-18 LAB — HEMOGLOBIN: 9.7 G/DL (ref 13.5–17.5)

## 2020-12-18 PROCEDURE — 6360000002 HC RX W HCPCS: Performed by: INTERNAL MEDICINE

## 2020-12-18 PROCEDURE — 85018 HEMOGLOBIN: CPT

## 2020-12-18 PROCEDURE — 96372 THER/PROPH/DIAG INJ SC/IM: CPT

## 2020-12-18 RX ADMIN — EPOETIN ALFA-EPBX 30000 UNITS: 10000 INJECTION, SOLUTION INTRAVENOUS; SUBCUTANEOUS at 13:23

## 2020-12-18 NOTE — PROGRESS NOTES
[address] [facility]     Epogen Visit with Peripheral Lab Draw    NAME:  Vargas Jules OF BIRTH:  1940  MEDICAL RECORD NUMBER:  [id number]  Episode Date:  12/18/2020    Patient arrived to [facility] 58   [] per wheelchair   [x] ambulatory  Alert and oriented X 4. Denies any side effects from last injection. Denies any new s/s of anemia. Denies new s/s of infection or increase in respiratory distress. Afebrile. Wearing face mask to prevent the spread of COVID-19. Peripheral Lab Draw    Blood Draw Site: Location:right arm, antecubital  Site cleansed with Chloroprep Scrub for 30 seconds: Yes  Site cleansed with Alcohol pads: Yes  Labs drawn with: 23 gauge             [x] Butterfly    [] Needle  Labs Obtained: Yes  Number of attempts: 1    Lab Test(s) Ordered: stat  HGB    Lab Draw Site:  Redness: No  Bruising: No   Edema: No  Pain: No       Epogen Visit     Is the patient experiencing any:  Fatigue:   []   None  [x]   Increase over baseline but not altering normal activities  []   Moderate of causing difficulty performing some activities  []   Severe or loss of ability to perform some activities  []   Bedridden or disabling    Dizziness or Lightheadedness: states had 2 times last week in the evening had an episode of dizziness but resolved in about 5 minutes. Instructed to monitor and report to MD as can be a side effect of medications taken earlier in the day. Instructed re safety in fall prevention.    []   None  [x]   No Interference  []   Interferes with functioning but not activities of daily living  []   Interferes with daily activies  []   Bedridden or disabling    Shortness of Breath:   []   None   [x]   Dyspneic on exertion   []   Dyspnea with normal activities  []   Dyspnea at rest Edema: trace Location of edema: left leg states it is unusual for him to have it and may have bumped it. Denies pain no marks on skin noted. Instructed to monitor and to call MD if edema increases or if accompanied by pain, redness    Tachycardia: No    Heart Palpitations: No      Chest Pain: No     BP (!) 146/68   Pulse 81   Temp 97.5 °F (36.4 °C) (Infrared)   Resp 16   SpO2 98%     Hold ARANZA dose if B/P is greater than: 180 mm/Hg     If Hgb remains less than 10 Increase dose by 25%. Do not increase dose more frequently than once every 4 weeks. If Hgb 10-10.5 g/dl  Continue same dose  If Hgb 10.6-11 g/dl Decrease dose by 25%. A decrease in dose can be done as frequently as every week. If Hgb is greater than 11 g/dl Hold Epogen. May resume ARANZA when Hgb is less than 10 with a 25% reduction in the dose from the last administered dose or decrease with a 25% reduction in the dose from the last administered dose or decrease  frequency. Last visit date: 12/4/20     Last Hgb: 9.9 g/dl   Last dose: 24,000 units    Current Lab Data:    Recent Labs     12/18/20  1237   HGB 9.7*     Dose will be increased    Epogen dosage: 90907 units was divided into 2 15,000 unit doses and administered slowly subcutaneously into the right upper arm . Dose verified by: HAJA Claire RN    Response to treatment:  Well tolerated by patient. Education:    Verbalized understanding    Scheduled to return for next dose of Epogen on January 4, 2021 .      Electronically signed by Madonna Cortez RN on 12/18/2020 at 12:59 PM

## 2021-01-04 ENCOUNTER — HOSPITAL ENCOUNTER (OUTPATIENT)
Dept: INFUSION THERAPY | Age: 81
Setting detail: INFUSION SERIES
Discharge: HOME | End: 2021-01-04
Payer: MEDICARE

## 2021-01-04 VITALS
HEART RATE: 80 BPM | RESPIRATION RATE: 16 BRPM | TEMPERATURE: 97.3 F | DIASTOLIC BLOOD PRESSURE: 65 MMHG | OXYGEN SATURATION: 97 % | SYSTOLIC BLOOD PRESSURE: 147 MMHG

## 2021-01-04 DIAGNOSIS — N18.4 ANEMIA IN STAGE 4 CHRONIC KIDNEY DISEASE (HCC): Primary | ICD-10-CM

## 2021-01-04 DIAGNOSIS — D63.1 ANEMIA IN STAGE 4 CHRONIC KIDNEY DISEASE (HCC): Primary | ICD-10-CM

## 2021-01-04 DIAGNOSIS — N18.4 CHRONIC KIDNEY DISEASE, STAGE IV (SEVERE) (HCC): ICD-10-CM

## 2021-01-04 LAB — HEMOGLOBIN: 9.2 G/DL (ref 13.5–17.5)

## 2021-01-04 PROCEDURE — 96372 THER/PROPH/DIAG INJ SC/IM: CPT

## 2021-01-04 PROCEDURE — 6360000002 HC RX W HCPCS: Performed by: INTERNAL MEDICINE

## 2021-01-04 PROCEDURE — 85018 HEMOGLOBIN: CPT

## 2021-01-04 RX ADMIN — EPOETIN ALFA-EPBX 20000 UNITS: 10000 INJECTION, SOLUTION INTRAVENOUS; SUBCUTANEOUS at 13:39

## 2021-01-04 RX ADMIN — EPOETIN ALFA-EPBX 10000 UNITS: 10000 INJECTION, SOLUTION INTRAVENOUS; SUBCUTANEOUS at 13:39

## 2021-01-04 NOTE — PROGRESS NOTES
Tachycardia: No    Heart Palpitations: No      Chest Pain: No     BP (!) 147/65   Pulse 80   Temp 97.3 °F (36.3 °C) (Infrared)   Resp 16   SpO2 97%     Hold ARANZA dose if B/P is greater than: 180 mm/Hg     If Hgb remains less than 10 Increase dose by 25%. Do not increase dose more frequently than once every 4 weeks. If Hgb 10-10.5 g/dl  Continue same dose  If Hgb 10.6-11 g/dl Decrease dose by 25%. A decrease in dose can be done as frequently as every week. If Hgb is greater than 11 g/dl Hold Epogen. May resume ARANZA when Hgb is less than 10 with a 25% reduction in the dose from the last administered dose or decrease with a 25% reduction in the dose from the last administered dose or decrease  frequency. Last visit date: 12/18/20     Last Hgb: 9.7 g/dl   Last dose: 26489 units    Current Lab Data:    No results for input(s): WBC, HGB, HCT, MCV, PLT in the last 72 hours. Dose will  Remain the same    Epogen dosage: 30,000 units was administered slowly subcutaneously into the right upper arm. (divided into 2 injections of 11730 units (1.5 ml) each injection   Dose verified by:  Tresa Perez RN    Response to treatment:  Well tolerated by patient. Education:    Verbalized understanding    Scheduled to return for next dose of Epogen on January 15, 2021. Patient wants to get appointments back on Fridays and has an appointment with Dr. aPddy Herrera on 1/18/21. Patient will bring orders with him next visit to draw extra labs.      Electronically signed by Tracy Gracia RN on 1/4/2021 at 12:57 PM

## 2021-01-15 ENCOUNTER — HOSPITAL ENCOUNTER (OUTPATIENT)
Dept: INFUSION THERAPY | Age: 81
Setting detail: INFUSION SERIES
Discharge: HOME | End: 2021-01-15
Payer: MEDICARE

## 2021-01-15 VITALS
TEMPERATURE: 97.1 F | SYSTOLIC BLOOD PRESSURE: 150 MMHG | RESPIRATION RATE: 16 BRPM | HEART RATE: 59 BPM | DIASTOLIC BLOOD PRESSURE: 69 MMHG | OXYGEN SATURATION: 98 %

## 2021-01-15 DIAGNOSIS — N18.4 CHRONIC KIDNEY DISEASE, STAGE IV (SEVERE) (HCC): ICD-10-CM

## 2021-01-15 DIAGNOSIS — N18.4 ANEMIA IN STAGE 4 CHRONIC KIDNEY DISEASE (HCC): Primary | ICD-10-CM

## 2021-01-15 DIAGNOSIS — D63.1 ANEMIA IN STAGE 4 CHRONIC KIDNEY DISEASE (HCC): Primary | ICD-10-CM

## 2021-01-15 LAB
ALBUMIN SERPL-MCNC: 4.3 G/DL (ref 3.4–5)
ANION GAP SERPL CALCULATED.3IONS-SCNC: 12 MMOL/L (ref 3–16)
BUN BLDV-MCNC: 68 MG/DL (ref 7–20)
CALCIUM SERPL-MCNC: 8.2 MG/DL (ref 8.3–10.6)
CHLORIDE BLD-SCNC: 101 MMOL/L (ref 99–110)
CO2: 21 MMOL/L (ref 21–32)
CREAT SERPL-MCNC: 4.3 MG/DL (ref 0.8–1.3)
GFR AFRICAN AMERICAN: 16
GFR NON-AFRICAN AMERICAN: 13
GLUCOSE BLD-MCNC: 104 MG/DL (ref 70–99)
HCT VFR BLD CALC: 28.8 % (ref 40.5–52.5)
HEMOGLOBIN: 9.4 G/DL (ref 13.5–17.5)
MCH RBC QN AUTO: 32.9 PG (ref 26–34)
MCHC RBC AUTO-ENTMCNC: 32.6 G/DL (ref 31–36)
MCV RBC AUTO: 100.9 FL (ref 80–100)
PARATHYROID HORMONE INTACT: 341.8 PG/ML (ref 14–72)
PDW BLD-RTO: 17.5 % (ref 12.4–15.4)
PHOSPHORUS: 4.6 MG/DL (ref 2.5–4.9)
PLATELET # BLD: 205 K/UL (ref 135–450)
PMV BLD AUTO: 7.2 FL (ref 5–10.5)
POTASSIUM SERPL-SCNC: 5 MMOL/L (ref 3.5–5.1)
RBC # BLD: 2.86 M/UL (ref 4.2–5.9)
SODIUM BLD-SCNC: 134 MMOL/L (ref 136–145)
WBC # BLD: 3.8 K/UL (ref 4–11)

## 2021-01-15 PROCEDURE — 6360000002 HC RX W HCPCS: Performed by: INTERNAL MEDICINE

## 2021-01-15 PROCEDURE — 96372 THER/PROPH/DIAG INJ SC/IM: CPT

## 2021-01-15 PROCEDURE — 80069 RENAL FUNCTION PANEL: CPT

## 2021-01-15 PROCEDURE — 83970 ASSAY OF PARATHORMONE: CPT

## 2021-01-15 PROCEDURE — 85027 COMPLETE CBC AUTOMATED: CPT

## 2021-01-15 RX ADMIN — EPOETIN ALFA-EPBX 40000 UNITS: 40000 INJECTION, SOLUTION INTRAVENOUS; SUBCUTANEOUS at 13:41

## 2021-01-15 NOTE — PROGRESS NOTES
[address] [facility]     Epogen Visit with Peripheral Lab Draw    NAME:  Marti Marc OF BIRTH:  1940  MEDICAL RECORD NUMBER:  [id number]  Episode Date:  1/15/2021    Patient arrived to [facility] 58   [] per wheelchair   [x] ambulatory    Alert and oriented x 4, denies any new s/s of anemia. No side effects from last injection. Denies new s/s of infection or increase in respiratory distress. Afebrile. Wearing face mask to prevent the spread of COVID-19    Peripheral Lab Draw    Blood Draw Site: Location:right arm, antecubital  Site cleansed with Chloroprep Scrub for 30 seconds: Yes  Site cleansed with Alcohol pads: Yes  Labs drawn with: 23 gauge             [x] Butterfly    [] Needle  Labs Obtained: Yes  Number of attempts: 1    Lab Test(s) Ordered: Stat CBC, routine renal and PTH    Lab Draw Site:  Redness: No  Bruising: No   Edema: No  Pain: No       Epogen Visit     Is the patient experiencing any:  Fatigue:   []   None  [x]   Increase over baseline but not altering normal activities  []   Moderate of causing difficulty performing some activities  []   Severe or loss of ability to perform some activities  []   Bedridden or disabling    Dizziness or Lightheadedness:   [x]   None  []   No Interference  []   Interferes with functioning but not activities of daily living  []   Interferes with daily activies  []   Bedridden or disabling    Shortness of Breath:   []   None   [x]   Dyspneic on exertion with steps   []   Dyspnea with normal activities  []   Dyspnea at rest    Edema: Trace Location of edema: left leg states has improved    Tachycardia: No    Heart Palpitations: No      Chest Pain: No     BP (!) 150/69   Pulse 59   Temp 97.1 °F (36.2 °C) (Oral)   Resp 16   SpO2 98%     Hold ARANZA dose if B/P is greater than: 180 mm/Hg     If Hgb remains less than 10 Increase dose by 25%. Do not increase dose more frequently than once every 4 weeks. If Hgb 10-10.5 g/dl  Continue same dose  If Hgb 10.6-11 g/dl Decrease dose by 25%. A decrease in dose can be done as frequently as every week. If Hgb is greater than 11 g/dl Hold Epogen. May resume ARANZA when Hgb is less than 10 with a 25% reduction in the dose from the last administered dose or decrease with a 25% reduction in the dose from the last administered dose or decrease  frequency. Last visit date: 1/4/21    Last Hgb: 9.2 g/dl   Last dose:30,000 units    Current Lab Data:    Recent Labs     01/15/21  1300   WBC 3.8*   HGB 9.4*   HCT 28.8*   .9*        Dose will be increased    Epogen dosage: 40,000 units was administered slowly subcutaneously into the right upper arm. Dose verified by: HAJA Claire RN    Response to treatment:  Well tolerated by patient. Education:    Verbalized understanding    Scheduled to return for next dose of Epogen on January 29, 2021 .     Has an appointment with Dr. Hi Rico on 1/18/21     Electronically signed by Susie Haque RN on 1/15/2021 at 1:51 PM

## 2021-01-29 ENCOUNTER — HOSPITAL ENCOUNTER (OUTPATIENT)
Dept: INFUSION THERAPY | Age: 81
Setting detail: INFUSION SERIES
Discharge: HOME | End: 2021-01-29
Payer: MEDICARE

## 2021-01-29 VITALS
DIASTOLIC BLOOD PRESSURE: 61 MMHG | HEART RATE: 78 BPM | RESPIRATION RATE: 16 BRPM | OXYGEN SATURATION: 96 % | SYSTOLIC BLOOD PRESSURE: 138 MMHG | TEMPERATURE: 98.2 F

## 2021-01-29 DIAGNOSIS — D63.1 ANEMIA IN STAGE 4 CHRONIC KIDNEY DISEASE (HCC): Primary | ICD-10-CM

## 2021-01-29 DIAGNOSIS — N18.4 ANEMIA IN STAGE 4 CHRONIC KIDNEY DISEASE (HCC): Primary | ICD-10-CM

## 2021-01-29 DIAGNOSIS — N18.4 CHRONIC KIDNEY DISEASE, STAGE IV (SEVERE) (HCC): ICD-10-CM

## 2021-01-29 LAB — HEMOGLOBIN: 9.4 G/DL (ref 13.5–17.5)

## 2021-01-29 PROCEDURE — 96372 THER/PROPH/DIAG INJ SC/IM: CPT

## 2021-01-29 PROCEDURE — 85018 HEMOGLOBIN: CPT

## 2021-01-29 PROCEDURE — 6360000002 HC RX W HCPCS: Performed by: INTERNAL MEDICINE

## 2021-01-29 RX ADMIN — EPOETIN ALFA-EPBX 40000 UNITS: 40000 INJECTION, SOLUTION INTRAVENOUS; SUBCUTANEOUS at 13:16

## 2021-01-29 ASSESSMENT — PAIN SCALES - GENERAL: PAINLEVEL_OUTOF10: 0

## 2021-01-29 NOTE — PROGRESS NOTES
[address] [facility]     Epogen Visit with Peripheral Lab Draw    NAME:  Kathy Gary OF BIRTH:  1940  MEDICAL RECORD NUMBER:  [id number]  Episode Date:  1/29/2021    Patient arrived to [facility] 58   [] per wheelchair   [x] ambulatory    Peripheral Lab Draw    Blood Draw Site: Location:right arm  Site cleansed with Chloroprep Scrub for 30 seconds: Yes  Site cleansed with Alcohol pads: Yes  Labs drawn with: 25 gauge             [x] Butterfly    [] Needle  Labs Obtained: Yes  Number of attempts: 1    Lab Test(s) Ordered: HGB    Lab Draw Site:  Redness: No  Bruising: No   Edema: No  Pain: No       Epogen Visit     Patient saw Dr Demetrice Chan last week and said he was doing OK and didn't need dialysis for awhile and wants labs done before next visit in 6 weeks. Is the patient experiencing any:  Fatigue:   []   None  [x]   Increase over baseline but not altering normal activities  []   Moderate of causing difficulty performing some activities  []   Severe or loss of ability to perform some activities  []   Bedridden or disabling    Dizziness or Lightheadedness:   [x]   None  []   No Interference  []   Interferes with functioning but not activities of daily living  []   Interferes with daily activies  []   Bedridden or disabling    Shortness of Breath:   []   None   [x]   Dyspneic on exertion (just going up & down steps)  []   Dyspnea with normal activities  []   Dyspnea at rest    Edema: 1+ Location of edema: both ankles    Tachycardia: No    Heart Palpitations: No      Chest Pain: No     /61   Pulse 78   Temp 98.2 °F (36.8 °C) (Oral)   Resp 16   SpO2 96%     Hold ARANZA dose if B/P is greater than: 180 mm/Hg     If Hgb remains less than 10 Increase dose by 25%. Do not increase dose more frequently than once every 4 weeks.   If Hgb 10-10.5 g/dl  Continue same dose If Hgb 10.6-11 g/dl Decrease dose by 25%. A decrease in dose can be done as frequently as every week. If Hgb is greater than 11 g/dl Hold Epogen. May resume ARANZA when Hgb is less than 10 with a 25% reduction in the dose from the last administered dose or decrease with a 25% reduction in the dose from the last administered dose or decrease  frequency. Last visit date: 1/15/21     Last Hgb: 9.4 g/dl   Last dose: 74221 units    Current Lab Data:    Recent Labs     01/29/21  1255   HGB 9.4*     Dose will be the same    Epogen dosage: 92039 units was administered slowly subcutaneously into the right upper arm. Dose verified by:  Chhaya Campbell RN    Response to treatment:  Well tolerated by patient. Education:    Verbalized understanding and printed AVS given to patient    Scheduled to return for next dose of Epogen on February 12, 2021 .      Electronically signed by Cosmo Perry RN on 1/29/2021 at 1:23 PM

## 2021-02-12 ENCOUNTER — HOSPITAL ENCOUNTER (OUTPATIENT)
Dept: INFUSION THERAPY | Age: 81
Setting detail: INFUSION SERIES
Discharge: HOME | End: 2021-02-12
Payer: MEDICARE

## 2021-02-12 VITALS
DIASTOLIC BLOOD PRESSURE: 65 MMHG | RESPIRATION RATE: 17 BRPM | TEMPERATURE: 97.9 F | OXYGEN SATURATION: 97 % | HEART RATE: 78 BPM | SYSTOLIC BLOOD PRESSURE: 143 MMHG

## 2021-02-12 DIAGNOSIS — N18.4 ANEMIA IN STAGE 4 CHRONIC KIDNEY DISEASE (HCC): Primary | ICD-10-CM

## 2021-02-12 DIAGNOSIS — N18.4 CHRONIC KIDNEY DISEASE, STAGE IV (SEVERE) (HCC): ICD-10-CM

## 2021-02-12 DIAGNOSIS — D63.1 ANEMIA IN STAGE 4 CHRONIC KIDNEY DISEASE (HCC): Primary | ICD-10-CM

## 2021-02-12 LAB — HEMOGLOBIN: 9.5 G/DL (ref 13.5–17.5)

## 2021-02-12 PROCEDURE — 6360000002 HC RX W HCPCS: Performed by: INTERNAL MEDICINE

## 2021-02-12 PROCEDURE — 96372 THER/PROPH/DIAG INJ SC/IM: CPT

## 2021-02-12 PROCEDURE — 85018 HEMOGLOBIN: CPT

## 2021-02-12 RX ADMIN — EPOETIN ALFA-EPBX 40000 UNITS: 40000 INJECTION, SOLUTION INTRAVENOUS; SUBCUTANEOUS at 13:06

## 2021-02-12 NOTE — DISCHARGE INSTR - COC
Continuity of Care Form    Patient Name: Antonette Mustafa   :  1940  MRN:  [id number]    Admit date:  2021  Discharge date:  ***    Code Status Order: No Order   Advance Directives:     Admitting Physician:  No admitting provider for patient encounter. PCP: Darius Bobo MD    Discharging Nurse: [facility] Unit/Room#: No information available for this encounter. Discharging Unit Phone Number: ***    Emergency Contact:   Extended Emergency Contact Information  Primary Emergency Contact: Maryam Sanchez  Address: [address] Phone: [phone]  Work Phone: [phone]  Relation: Spouse    Past Surgical History:  History reviewed. No pertinent surgical history. Immunization History: There is no immunization history on file for this patient.     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    Chronic kidney disease, stage IV (severe) (HCC) N18.4       Isolation/Infection:   Isolation          No Isolation        Patient Infection Status     None to display          Nurse Assessment:  Last Vital Signs: BP (!) 143/65   Pulse 78   Temp 97.9 °F (36.6 °C) (Oral)   Resp 17   SpO2 97%     Last documented pain score (0-10 scale):    Last Weight:   Wt Readings from Last 1 Encounters:   20 192 lb 0.3 oz (87.1 kg)     Mental Status:  {IP PT MENTAL STATUS:91839}    IV Access:  { JUAN CARLOS IV ACCESS:017309353}    Nursing Mobility/ADLs:  Walking   {CHP DME ILCO:575865722}  Transfer  {CHP DME DFRJ:935168308}  Bathing  {CHP DME XGBC:708619923}  Dressing  {CHP DME BMJU:671227275}  Toileting  {CHP DME FVMC:570173951}  Feeding  {CHP DME RIFX:628678028}  Med Admin  {CHP DME WTYH:015378359}  Med Delivery   { JUAN CARLOS MED Delivery:815007200}    Wound Care Documentation and Therapy:        Elimination:  Continence:   · Bowel: {YES / PO:75055} · Bladder: {YES / AR:52934}  Urinary Catheter: {Urinary Catheter:313075066}   Colostomy/Ileostomy/Ileal Conduit: {YES / LE:29429}       Date of Last BM: ***  No intake or output data in the 24 hours ending 21 1256  No intake/output data recorded.     Safety Concerns:     [address] JUAN CARLOS Safety Concerns:595740389}    Impairments/Disabilities:      [address] [facility] Impairments/Disabilities:928128515}    Nutrition Therapy:  Current Nutrition Therapy:   [address] [facility] Diet List:218522038}    Routes of Feeding: {CHP DME Other Feedings:381321675}  Liquids: {Slp liquid thickness:94341}  Daily Fluid Restriction: {CHP DME Yes amt example:671614117}  Last Modified Barium Swallow with Video (Video Swallowing Test): {Done Not Done GGXM:491623908}    Treatments at the Time of Hospital Discharge:   Respiratory Treatments: ***  Oxygen Therapy:  {Therapy; copd oxygen:98059}  Ventilator:    { CC Vent PJAZ:661983604}    Rehab Therapies: {THERAPEUTIC INTERVENTION:2258787504}  Weight Bearing Status/Restrictions: [facility] Weight Bearin}  Other Medical Equipment (for information only, NOT a DME order):  {EQUIPMENT:615625901}  Other Treatments: ***    Patient's personal belongings (please select all that are sent with patient):  {P DME Belongings:880513220}    RN SIGNATURE:  {Esignature:509489997}    CASE MANAGEMENT/SOCIAL WORK SECTION    Inpatient Status Date: ***    Readmission Risk Assessment Score:  Readmission Risk              Risk of Unplanned Readmission:        0           Discharging to Facility/ Agency   · Name:   · Address:  · Phone:  · Fax:    Dialysis Facility (if applicable)   · Name:  · Address:  · Dialysis Schedule:  · Phone:  · Fax:    / signature: {Esignature:627727039}    PHYSICIAN SECTION    Prognosis: {Prognosis:0707416927}    Condition at Discharge: [address] Patient Condition:134755615}    Rehab Potential (if transferring to Rehab): {Prognosis:7264101890} Recommended Labs or Other Treatments After Discharge: ***    Physician Certification: I certify the above information and transfer of Cr Martinez  is necessary for the continuing treatment of the diagnosis listed and that he requires {Admit to Appropriate Level of Care:84857} for {GREATER/LESS:902447354} 30 days.      Update Admission H&P: {CHP DME Changes in KUORD:785330313}    PHYSICIAN SIGNATURE:  {Esignature:609734579}

## 2021-02-12 NOTE — PROGRESS NOTES
[address] [facility]     Epogen Visit with Peripheral Lab Draw    NAME:  Fred Vela  YOB: 1940  MEDICAL RECORD NUMBER:  [id number]  Episode Date:  2/12/2021    Patient arrived to [facility] 58   [] per wheelchair   [x] ambulatory    Peripheral Lab Draw    Blood Draw Site: Location:right arm  Site cleansed with Chloroprep Scrub for 30 seconds: Yes  Site cleansed with Alcohol pads: Yes  Labs drawn with: 25 gauge             [x] Butterfly    [] Needle  Labs Obtained: Yes  Number of attempts: 1    Lab Test(s) Ordered: HGB    Lab Draw Site:  Redness: No  Bruising: No   Edema: No  Pain: No       Epogen Visit     Is the patient experiencing any:  Fatigue:   [x]   None  []   Increase over baseline but not altering normal activities  []   Moderate of causing difficulty performing some activities  []   Severe or loss of ability to perform some activities  []   Bedridden or disabling    Dizziness or Lightheadedness:   [x]   None  []   No Interference  []   Interferes with functioning but not activities of daily living  []   Interferes with daily activies  []   Bedridden or disabling    Shortness of Breath:   [x]   None   []   Dyspneic on exertion   []   Dyspnea with normal activities  []   Dyspnea at rest    Edema: Trace Location of edema: both ankles    Tachycardia: No    Heart Palpitations: No      Chest Pain: No     BP (!) 143/65   Pulse 78   Temp 97.9 °F (36.6 °C) (Oral)   Resp 17   SpO2 97%     Hold ARANZA dose if B/P is greater than: 180 mm/Hg     If Hgb remains less than 10 Increase dose by 25%. Do not increase dose more frequently than once every 4 weeks. If Hgb 10-10.5 g/dl  Continue same dose  If Hgb 10.6-11 g/dl Decrease dose by 25%. A decrease in dose can be done as frequently as every week. If Hgb is greater than 11 g/dl Hold Epogen. May resume ARANZA when Hgb is less than 10 with a 25% reduction in the dose from the last administered dose or decrease with a 25% reduction in the dose from the last administered dose or decrease  frequency. Last visit date: 1/29/21     Last Hgb: 9.4 g/dl   Last dose: 57931 units    Current Lab Data:    Recent Labs     02/12/21  1225   HGB 9.5*     Dose will be the same. Booker Cross RN talked with DR Paddy Herrera and he said to continue same dose and draw all labs in 2 weeks including iron sutdies and he will see him on 3/9/21. Epogen dosage: 16648 units was administered slowly subcutaneously into the right upper arm. Dose verified by:  Juan Miguel Saldaña RN    Response to treatment:  Well tolerated by patient. Education:    Verbalized understanding and printed AVS given to patient    Scheduled to return for next dose of Epogen on February 26, 2021 .      Electronically signed by Jorge Mendenhall RN on 2/12/2021 at 1:09 PM

## 2021-02-26 ENCOUNTER — HOSPITAL ENCOUNTER (OUTPATIENT)
Dept: INFUSION THERAPY | Age: 81
Setting detail: INFUSION SERIES
Discharge: HOME | End: 2021-02-26
Payer: MEDICARE

## 2021-02-26 VITALS
SYSTOLIC BLOOD PRESSURE: 135 MMHG | OXYGEN SATURATION: 96 % | DIASTOLIC BLOOD PRESSURE: 74 MMHG | HEART RATE: 82 BPM | RESPIRATION RATE: 18 BRPM | TEMPERATURE: 98 F

## 2021-02-26 DIAGNOSIS — N18.4 ANEMIA IN STAGE 4 CHRONIC KIDNEY DISEASE (HCC): Primary | ICD-10-CM

## 2021-02-26 DIAGNOSIS — D63.1 ANEMIA IN STAGE 4 CHRONIC KIDNEY DISEASE (HCC): Primary | ICD-10-CM

## 2021-02-26 DIAGNOSIS — N18.4 CHRONIC KIDNEY DISEASE, STAGE IV (SEVERE) (HCC): ICD-10-CM

## 2021-02-26 LAB
ALBUMIN SERPL-MCNC: 4 G/DL (ref 3.4–5)
ANION GAP SERPL CALCULATED.3IONS-SCNC: 14 MMOL/L (ref 3–16)
BUN BLDV-MCNC: 68 MG/DL (ref 7–20)
CALCIUM SERPL-MCNC: 7.7 MG/DL (ref 8.3–10.6)
CHLORIDE BLD-SCNC: 106 MMOL/L (ref 99–110)
CO2: 19 MMOL/L (ref 21–32)
CREAT SERPL-MCNC: 4.5 MG/DL (ref 0.8–1.3)
FERRITIN: 188 NG/ML (ref 30–400)
GFR AFRICAN AMERICAN: 15
GFR NON-AFRICAN AMERICAN: 13
GLUCOSE BLD-MCNC: 138 MG/DL (ref 70–99)
HCT VFR BLD CALC: 29.3 % (ref 40.5–52.5)
HEMOGLOBIN: 9.3 G/DL (ref 13.5–17.5)
IRON SATURATION: 28 % (ref 20–50)
IRON: 62 UG/DL (ref 59–158)
MCH RBC QN AUTO: 32.4 PG (ref 26–34)
MCHC RBC AUTO-ENTMCNC: 31.7 G/DL (ref 31–36)
MCV RBC AUTO: 102.2 FL (ref 80–100)
PDW BLD-RTO: 17.6 % (ref 12.4–15.4)
PHOSPHORUS: 4.4 MG/DL (ref 2.5–4.9)
PLATELET # BLD: 195 K/UL (ref 135–450)
PMV BLD AUTO: 7.4 FL (ref 5–10.5)
POTASSIUM SERPL-SCNC: 5.1 MMOL/L (ref 3.5–5.1)
RBC # BLD: 2.87 M/UL (ref 4.2–5.9)
SODIUM BLD-SCNC: 139 MMOL/L (ref 136–145)
TOTAL IRON BINDING CAPACITY: 219 UG/DL (ref 260–445)
VITAMIN D 25-HYDROXY: 34 NG/ML
WBC # BLD: 3.6 K/UL (ref 4–11)

## 2021-02-26 PROCEDURE — 96372 THER/PROPH/DIAG INJ SC/IM: CPT

## 2021-02-26 PROCEDURE — 6360000002 HC RX W HCPCS: Performed by: INTERNAL MEDICINE

## 2021-02-26 PROCEDURE — 83550 IRON BINDING TEST: CPT

## 2021-02-26 PROCEDURE — 80069 RENAL FUNCTION PANEL: CPT

## 2021-02-26 PROCEDURE — 85027 COMPLETE CBC AUTOMATED: CPT

## 2021-02-26 PROCEDURE — 82728 ASSAY OF FERRITIN: CPT

## 2021-02-26 PROCEDURE — 83540 ASSAY OF IRON: CPT

## 2021-02-26 PROCEDURE — 82306 VITAMIN D 25 HYDROXY: CPT

## 2021-02-26 RX ADMIN — EPOETIN ALFA-EPBX 40000 UNITS: 40000 INJECTION, SOLUTION INTRAVENOUS; SUBCUTANEOUS at 13:40

## 2021-02-26 NOTE — PROGRESS NOTES
[address] [facility]     Epogen Visit with Peripheral Lab Draw    NAME:  Elizabeth Hess  YOB: 1940  MEDICAL RECORD NUMBER:  [id number]  Episode Date:  2/26/2021    Patient arrived to [facility] 58   [] per wheelchair   [x] ambulatory    Peripheral Lab Draw    Blood Draw Site: Location:right antecubital  Site cleansed with Chloroprep Scrub for 30 seconds: Yes  Labs drawn with: 23 gauge             [x] Butterfly    [] Needle  Labs Obtained: Yes  Number of attempts: 1    Lab Test(s) Ordered: CBC, RENAL, VIT D, IRON, TIBC, FERRITIN    Lab Draw Site:  Redness: No  Bruising: No   Edema: No  Pain: No       Epogen Visit     Is the patient experiencing any:  Fatigue:   []   None  [x]   Increase over baseline but not altering normal activities  []   Moderate of causing difficulty performing some activities  []   Severe or loss of ability to perform some activities  []   Bedridden or disabling    Dizziness or Lightheadedness:   []   None  [x]   No Interference  []   Interferes with functioning but not activities of daily living  []   Interferes with daily activies  []   Bedridden or disabling    Shortness of Breath:   []   None   [x]   Dyspneic on exertion   []   Dyspnea with normal activities  []   Dyspnea at rest    Edema: Trace Location of edema: left leg and right leg    Tachycardia: No    Heart Palpitations: No      Chest Pain: No     /74   Pulse 82   Temp 98 °F (36.7 °C) (Oral)   Resp 18   SpO2 96%     Hold ARANZA dose if B/P is greater than: 180 mm/Hg     If Hgb remains less than 10 Increase dose by 25%. Do not increase dose more frequently than once every 4 weeks. If Hgb 10-10.5 g/dl  Continue same dose  If Hgb 10.6-11 g/dl Decrease dose by 25%. A decrease in dose can be done as frequently as every week. If Hgb is greater than 11 g/dl Hold Epogen. May resume ARANZA when Hgb is less than 10 with a 25% reduction in the dose from the last administered dose or decrease with a 25% reduction in the dose from the last administered dose or decrease  frequency. Last visit date: 2/12/21     Last Hgb: 9.5 g/dl   Last dose: 40,000 units    Current Lab Data:    Recent Labs     02/26/21  1300   WBC 3.6*   HGB 9.3*   HCT 29.3*   .2*        Dose will be the same since his dose is up to 40,000 units. Epogen dosage: 40,000 units was administered slowly subcutaneously into the right upper arm. Dose verified by: Delfina Bradley RN    Response to treatment:  Well tolerated by patient. Education:    Indicates understanding    Scheduled to return for next dose of Epogen on March 12, 2021    Patient is scheduled to see Dr. Heather Erazo on 3/9/21 so he was given a copy of epo flow sheet to take to Dr. Heather Erazo.  .     Electronically signed by Layla Alexander RN on 2/26/2021 at 1:22 PM

## 2021-03-12 ENCOUNTER — HOSPITAL ENCOUNTER (OUTPATIENT)
Dept: INFUSION THERAPY | Age: 81
Setting detail: INFUSION SERIES
Discharge: HOME | End: 2021-03-12
Payer: MEDICARE

## 2021-03-12 VITALS
HEIGHT: 71 IN | WEIGHT: 202 LBS | TEMPERATURE: 98.1 F | HEART RATE: 77 BPM | RESPIRATION RATE: 17 BRPM | BODY MASS INDEX: 28.28 KG/M2 | OXYGEN SATURATION: 95 %

## 2021-03-12 DIAGNOSIS — N18.4 CHRONIC KIDNEY DISEASE, STAGE IV (SEVERE) (HCC): ICD-10-CM

## 2021-03-12 DIAGNOSIS — N18.4 ANEMIA IN STAGE 4 CHRONIC KIDNEY DISEASE (HCC): Primary | ICD-10-CM

## 2021-03-12 DIAGNOSIS — D63.1 ANEMIA IN STAGE 4 CHRONIC KIDNEY DISEASE (HCC): Primary | ICD-10-CM

## 2021-03-12 LAB — HEMOGLOBIN: 9.5 G/DL (ref 13.5–17.5)

## 2021-03-12 PROCEDURE — 6360000002 HC RX W HCPCS: Performed by: INTERNAL MEDICINE

## 2021-03-12 PROCEDURE — 85018 HEMOGLOBIN: CPT

## 2021-03-12 PROCEDURE — 96372 THER/PROPH/DIAG INJ SC/IM: CPT

## 2021-03-12 RX ORDER — AMLODIPINE BESYLATE 5 MG/1
5 TABLET ORAL DAILY
COMMUNITY
End: 2021-08-27

## 2021-03-12 RX ORDER — LABETALOL 200 MG/1
200 TABLET, FILM COATED ORAL DAILY
COMMUNITY
End: 2021-08-27

## 2021-03-12 RX ADMIN — EPOETIN ALFA-EPBX 40000 UNITS: 40000 INJECTION, SOLUTION INTRAVENOUS; SUBCUTANEOUS at 13:31

## 2021-03-12 NOTE — PROGRESS NOTES
[address] [facility]     Epogen Visit with Peripheral Lab Draw    NAME:  Colby Clarke OF BIRTH:  1940  MEDICAL RECORD NUMBER:  [id number]  Episode Date:  3/12/2021    Patient arrived to [facility] 58   [] per wheelchair   [x] ambulatory    Peripheral Lab Draw    Blood Draw Site: Location:right arm  Site cleansed with Chloroprep Scrub for 30 seconds: Yes  Site cleansed with Alcohol pads: Yes  Labs drawn with: 25 gauge             [x] Butterfly    [] Needle  Labs Obtained: Yes  Number of attempts: 1    Lab Test(s) Ordered: HGB    Lab Draw Site:  Redness: No  Bruising: No   Edema: No  Pain: No       Epogen Visit     Is the patient experiencing any:  Fatigue: no  [x]   None  []   Increase over baseline but not altering normal activities  []   Moderate of causing difficulty performing some activities  []   Severe or loss of ability to perform some activities  []   Bedridden or disabling    Dizziness or Lightheadedness: yes  []   None  [x]   No Interference  []   Interferes with functioning but not activities of daily living  []   Interferes with daily activies  []   Bedridden or disabling    Shortness of Breath: no  [x]   None   []   Dyspneic on exertion   []   Dyspnea with normal activities  []   Dyspnea at rest    Edema: 1+ Location of edema: left leg and right leg    Tachycardia: No    Heart Palpitations: No      Chest Pain: No     Pulse 77   Temp 98.1 °F (36.7 °C) (Oral)   Resp 17   Ht 5' 11\" (1.803 m)   Wt 202 lb (91.6 kg)   SpO2 95%   BMI 28.17 kg/m²     Hold ARANZA dose if B/P is greater than: 180 mm/Hg     If Hgb remains less than 10 Increase dose by 25%. Do not increase dose more frequently than once every 4 weeks. If Hgb 10-10.5 g/dl  Continue same dose  If Hgb 10.6-11 g/dl Decrease dose by 25%. A decrease in dose can be done as frequently as every week. If Hgb is greater than 11 g/dl Hold Epogen.   May resume ARANZA when Hgb is less than 10 with a 25% reduction in the dose from the last administered dose or decrease with a 25% reduction in the dose from the last administered dose or decrease  frequency. Last visit date: 2/26/21     Last Hgb: 9.3 g/dl   Last dose: 27828 units    Current Lab Data:hgb 9.5    No results for input(s): WBC, HGB, HCT, MCV, PLT in the last 72 hours. Dose will be same    Epogen dosage: 87972 units was administered slowly subcutaneously into the right upper arm. Dose verified by:  Remington Waddell RN    Response to treatment:  Well tolerated by patient. Education:    Indicates understanding    Scheduled to return for next dose of Epogen on March 26, 2021 .      Electronically signed by Severiano Gold RN on 3/12/2021 at 12:41 PM

## 2021-03-26 ENCOUNTER — HOSPITAL ENCOUNTER (OUTPATIENT)
Dept: INFUSION THERAPY | Age: 81
Setting detail: INFUSION SERIES
Discharge: HOME | End: 2021-03-26
Payer: MEDICARE

## 2021-03-26 VITALS
RESPIRATION RATE: 17 BRPM | SYSTOLIC BLOOD PRESSURE: 159 MMHG | WEIGHT: 198 LBS | TEMPERATURE: 97.7 F | BODY MASS INDEX: 27.62 KG/M2 | HEART RATE: 75 BPM | DIASTOLIC BLOOD PRESSURE: 71 MMHG

## 2021-03-26 DIAGNOSIS — N18.4 CHRONIC KIDNEY DISEASE, STAGE IV (SEVERE) (HCC): ICD-10-CM

## 2021-03-26 DIAGNOSIS — D63.1 ANEMIA IN STAGE 4 CHRONIC KIDNEY DISEASE (HCC): Primary | ICD-10-CM

## 2021-03-26 DIAGNOSIS — N18.4 ANEMIA IN STAGE 4 CHRONIC KIDNEY DISEASE (HCC): Primary | ICD-10-CM

## 2021-03-26 LAB — HEMOGLOBIN: 9.9 G/DL (ref 13.5–17.5)

## 2021-03-26 PROCEDURE — 96372 THER/PROPH/DIAG INJ SC/IM: CPT

## 2021-03-26 PROCEDURE — 6360000002 HC RX W HCPCS: Performed by: INTERNAL MEDICINE

## 2021-03-26 PROCEDURE — 85018 HEMOGLOBIN: CPT

## 2021-03-26 RX ADMIN — EPOETIN ALFA-EPBX 40000 UNITS: 40000 INJECTION, SOLUTION INTRAVENOUS; SUBCUTANEOUS at 13:49

## 2021-03-26 ASSESSMENT — PAIN SCALES - GENERAL: PAINLEVEL_OUTOF10: 0

## 2021-03-26 NOTE — PROGRESS NOTES
[address] [facility]     Retacrit Visit with Peripheral Lab Draw    NAME:  Humberto Spain OF BIRTH:  1940  MEDICAL RECORD NUMBER:  [id number]  Episode Date:  3/26/2021    [facility] to [facility]   [] per wheelchair   [x] ambulatory    Peripheral Lab Draw    Blood Draw Site: Location:  Right ACF  Site cleansed with Chloroprep Scrub for 30 seconds: yes. Site cleansed with Alcohol pads: no  Labs drawn with: 25 gauge             [x] Butterfly    [] Needle    Labs Obtained: yes   Number of attempts: 1    Lab Test(s) Ordered: stat hgb    Lab Draw Site:  Redness:  No  Bruising: YES   Edema: No  Pain:  Slightly from the  bruise       Is the patient experiencing any:  Fatigue:   []   None  []   Increase over baseline but not altering normal activities  [x]   Moderate of causing difficulty performing some activities  []   Severe or loss of ability to perform some activities  []   Bedridden or disabling    Dizziness or Lightheadedness:   [x]   None  []   No Interference  []   Interferes with functioning but not activities of daily living  []   Interferes with daily activies  []   Bedridden or disabling    Shortness of Breath:   []   None   [x]   Dyspneic on exertion   []   Dyspnea with normal activities  []   Dyspnea at rest    Edema: none    Tachycardia:  No.    Heart Palpitations:  No.      Chest Pain: No.     BP (!) 159/71   Pulse 75   Temp 97.7 °F (36.5 °C) (Infrared)   Resp 17   Wt 198 lb (89.8 kg)   BMI 27.62 kg/m²     Hold ARANZA dose if B/P is greater than: 180 mm/Hg     If Hgb remains less than 10 Increase dose by 25%. Do not increase dose more frequently than once every 4 weeks. If Hgb 10-10.5 g/dl  Continue same dose  If Hgb 10.6-11 g/dl Decrease dose by 25%. A decrease in dose can be done as frequently as every week. If Hgb is greater than 11 g/dl Hold Epogen.   May resume ARANZA when Hgb is less than 10 with a 25% reduction in the dose from the last administered dose or decrease with a 25% reduction in the dose from the last administered dose or decrease  frequency. Last visit date:  03/02/21    Last Hgb:  9.5  g/dl   Last dose: 40,000  Units of retacrit injection. Dose will be kept the same at 40,000 units SQ    Epogen dosage:   40,000 units was administered slowly subcutaneously into the RIGHT upper arm. Dose verified by: Wilmar Bowser RN RN    Response to treatment:  Well tolerated by patient.      Education:    Indicates understanding    Scheduled to return for next dose of Epogen on 04/09/21 at 1 pm and then 04/23/21 at 1 pm.    Electronically signed by Yared Salomon RN on 3/26/2021 at 2:52 PM

## 2021-05-07 ENCOUNTER — HOSPITAL ENCOUNTER (OUTPATIENT)
Dept: INFUSION THERAPY | Age: 81
Setting detail: INFUSION SERIES
Discharge: HOME | End: 2021-05-07
Payer: MEDICARE

## 2021-05-07 VITALS
SYSTOLIC BLOOD PRESSURE: 121 MMHG | OXYGEN SATURATION: 98 % | RESPIRATION RATE: 17 BRPM | HEART RATE: 77 BPM | TEMPERATURE: 97.5 F | DIASTOLIC BLOOD PRESSURE: 62 MMHG

## 2021-05-07 DIAGNOSIS — D63.1 ANEMIA IN STAGE 4 CHRONIC KIDNEY DISEASE (HCC): Primary | ICD-10-CM

## 2021-05-07 DIAGNOSIS — N18.4 CHRONIC KIDNEY DISEASE, STAGE IV (SEVERE) (HCC): ICD-10-CM

## 2021-05-07 DIAGNOSIS — N18.4 ANEMIA IN STAGE 4 CHRONIC KIDNEY DISEASE (HCC): Primary | ICD-10-CM

## 2021-05-07 LAB
FERRITIN: 302.7 NG/ML (ref 30–400)
HEMOGLOBIN: 8.1 G/DL (ref 13.5–17.5)
IRON SATURATION: 33 % (ref 20–50)
IRON: 71 UG/DL (ref 59–158)
TOTAL IRON BINDING CAPACITY: 217 UG/DL (ref 260–445)

## 2021-05-07 PROCEDURE — 6360000002 HC RX W HCPCS: Performed by: INTERNAL MEDICINE

## 2021-05-07 PROCEDURE — 83550 IRON BINDING TEST: CPT

## 2021-05-07 PROCEDURE — 85018 HEMOGLOBIN: CPT

## 2021-05-07 PROCEDURE — 96372 THER/PROPH/DIAG INJ SC/IM: CPT

## 2021-05-07 PROCEDURE — 83540 ASSAY OF IRON: CPT

## 2021-05-07 PROCEDURE — 82728 ASSAY OF FERRITIN: CPT

## 2021-05-07 RX ADMIN — EPOETIN ALFA-EPBX 40000 UNITS: 40000 INJECTION, SOLUTION INTRAVENOUS; SUBCUTANEOUS at 11:55

## 2021-05-07 ASSESSMENT — PAIN SCALES - GENERAL: PAINLEVEL_OUTOF10: 0

## 2021-05-07 NOTE — PROGRESS NOTES
[address] [facility]     Epogen Visit with Peripheral Lab Draw    NAME:  Barbara Duval OF BIRTH:  1940  MEDICAL RECORD NUMBER:  [id number]  Episode Date:  5/7/2021    Patient arrived to [facility] 58   [] per wheelchair   [x] ambulatory    Alert and oriented to person, place, time and person. Patient reports that he is getting a little forgetful at times. Reports that he fell 3 days ago because his blood sugar was very low. Patient states that his son called the doctor after this incident and his medication was adjusted. Patient reports that he did not have any injuries from the fall and that he has been feeling well the last few days. Patient states that he is checking his blood sugar twice daily. Today's AM result was 141. Encouraged patient to check his blood sugar 3 times each day and patient verbalized understanding. Peripheral Lab Draw    Blood Draw Site: Location: right upper anterior forearm  Site cleansed with Chloroprep Scrub for 30 seconds: Yes  Labs drawn with: 23 gauge             [x] Butterfly    [] Needle  Labs Obtained: Yes  Number of attempts: 1    Lab Test(s) Ordered: Ferritin, Iron studies and STAT Hemoglobin    Lab Draw Site:  Redness: No  Bruising: No   Edema: No  Pain: No       Epogen Visit     Is the patient experiencing any:  Fatigue:   []   None  [x]   Increase over baseline but not altering normal activities - states that he wakes up in the morning with a lot of energy but by the early evening, he gets really tired. []   Moderate of causing difficulty performing some activities  []   Severe or loss of ability to perform some activities  []   Bedridden or disabling    Dizziness or Lightheadedness:   []   None  []   No Interference  [x]   Interferes with functioning but not activities of daily living - patient states that he was very dizzy when he fell on May 4, 2021 and has had a few episodes since then.  Patient states that he is more aware of this and is getting up slower. []   Interferes with daily activies  []   Bedridden or disabling    Shortness of Breath:   []   None   [x]   Dyspneic on exertion - when walking long distance or climbing stairs  []   Dyspnea with normal activities  []   Dyspnea at rest    Edema: None noted    Tachycardia: No    Heart Palpitations: No      Chest Pain: No     /62   Pulse 77   Temp 97.5 °F (36.4 °C) (Oral)   Resp 17   SpO2 98%     Hold ARANZA dose if B/P is greater than: 180 mm/Hg     If Hgb remains less than 10 Increase dose by 25%. Do not increase dose more frequently than once every 4 weeks. If Hgb 10-10.5 g/dl  Continue same dose  If Hgb 10.6-11 g/dl Decrease dose by 25%. A decrease in dose can be done as frequently as every week. If Hgb is greater than 11 g/dl Hold Epogen. May resume ARANZA when Hgb is less than 10 with a 25% reduction in the dose from the last administered dose or decrease with a 25% reduction in the dose from the last administered dose or decrease  frequency. Last visit date: 3/26/2021     Last Hgb: 9.9 g/dl   Last dose: 40,000 units    Current Lab Data:    Recent Labs     05/07/21  1112   HGB 8.1*     Dose will remain the same due to order not to increase dose over 40,000 units and also due to the fact that patient has missed the last 2 appointments. Epogen dosage: 40,000 units was administered slowly subcutaneously into the right upper arm. Dose verified by:  Harjit Dowling RN    Response to treatment:  Well tolerated by patient. Education:  Verbal and written discharge instructions reviewed with patient and his daughter-in-law. Verbalized understanding. Written copy given via AVS    Scheduled to return for next dose of Epogen on May 21, 2021 .      Electronically signed by Dav Spencer RN on 5/7/2021 at 11:28 AM

## 2021-05-21 ENCOUNTER — HOSPITAL ENCOUNTER (OUTPATIENT)
Dept: INFUSION THERAPY | Age: 81
Setting detail: INFUSION SERIES
Discharge: HOME | End: 2021-05-21
Payer: MEDICARE

## 2021-05-21 VITALS
RESPIRATION RATE: 18 BRPM | WEIGHT: 180 LBS | DIASTOLIC BLOOD PRESSURE: 68 MMHG | HEIGHT: 71 IN | HEART RATE: 76 BPM | BODY MASS INDEX: 25.2 KG/M2 | SYSTOLIC BLOOD PRESSURE: 138 MMHG | TEMPERATURE: 98.6 F

## 2021-05-21 DIAGNOSIS — N18.4 ANEMIA IN STAGE 4 CHRONIC KIDNEY DISEASE (HCC): Primary | ICD-10-CM

## 2021-05-21 DIAGNOSIS — D63.1 ANEMIA IN STAGE 4 CHRONIC KIDNEY DISEASE (HCC): Primary | ICD-10-CM

## 2021-05-21 DIAGNOSIS — N18.4 CHRONIC KIDNEY DISEASE, STAGE IV (SEVERE) (HCC): ICD-10-CM

## 2021-05-21 LAB — HEMOGLOBIN: 8.1 G/DL (ref 13.5–17.5)

## 2021-05-21 PROCEDURE — 96372 THER/PROPH/DIAG INJ SC/IM: CPT

## 2021-05-21 PROCEDURE — 6360000002 HC RX W HCPCS: Performed by: INTERNAL MEDICINE

## 2021-05-21 PROCEDURE — 85018 HEMOGLOBIN: CPT

## 2021-05-21 RX ADMIN — EPOETIN ALFA-EPBX 40000 UNITS: 40000 INJECTION, SOLUTION INTRAVENOUS; SUBCUTANEOUS at 12:55

## 2021-05-21 NOTE — PROGRESS NOTES
[address] [facility]     Epogen Visit with Peripheral Lab Draw    NAME:  Mejia Caraballo OF BIRTH:  1940  MEDICAL RECORD NUMBER:  [id number]  Episode Date:  5/21/2021    Patient arrived to [facility] 58   [] per wheelchair   [x] ambulatory    Peripheral Lab Draw    Blood Draw Site: Location:right arm  Site cleansed with Chloroprep Scrub for 30 seconds: Yes  Site cleansed with Alcohol pads: Yes  Labs drawn with: 23 gauge             [x] Butterfly    [] Needle  Labs Obtained: Yes  Number of attempts: 1    Lab Test(s) Ordered: HGB    Lab Draw Site:  Redness: No  Bruising: No   Edema: No  Pain: No       Epogen Visit     Is the patient experiencing any:  Fatigue: yes  [x]   None  []   Increase over baseline but not altering normal activities  []   Moderate of causing difficulty performing some activities  []   Severe or loss of ability to perform some activities  []   Bedridden or [id number]  Dizziness or Lightheadedness: no  [x]   None  []   No Interference  []   Interferes with functioning but not activities of daily living  []   Interferes with daily activies  []   Bedridden or disabling    Shortness of Breath: yes  []   None   [x]   Dyspneic on exertion   []   Dyspnea with normal activities  []   Dyspnea at rest    Edema: Trace Location of edema: left leg and right leg    Tachycardia: No    Heart Palpitations: No      Chest Pain: No     /68   Pulse 76   Temp 98.6 °F (37 °C) (Oral)   Resp 18   Ht 5' 11\" (1.803 m)   Wt 180 lb (81.6 kg)   BMI 25.10 kg/m²     Hold ARANZA dose if B/P is greater than: 180 mm/Hg     If Hgb remains less than 10 Increase dose by 25%. Do not increase dose more frequently than once every 4 weeks. If Hgb 10-10.5 g/dl  Continue same dose  If Hgb 10.6-11 g/dl Decrease dose by 25%. A decrease in dose can be done as frequently as every week. If Hgb is greater than 11 g/dl Hold Epogen.   May resume ARANZA when Hgb is less than 10 with a 25% reduction in the dose from the last administered dose or decrease with a 25% reduction in the dose from the last administered dose or decrease  frequency. Last visit date: 5/7/21     Last Hgb: 8.1 g/dl   Last dose: 92708 units    Current Lab Data:HGB 8.1    No results for input(s): WBC, HGB, HCT, MCV, PLT in the last 72 hours. Dose will be same    Epogen dosage: 30453 units was administered slowly subcutaneously into the right upper arm. Dose verified by:  Jose Muhammad RN    Response to treatment:  Well tolerated by patient. Education:    Indicates understanding    Scheduled to return for next dose of Epogen on June 4, 2021 .      Electronically signed by Caterina Fry RN on 5/21/2021 at 12:44 PM

## 2021-06-04 ENCOUNTER — HOSPITAL ENCOUNTER (OUTPATIENT)
Dept: INFUSION THERAPY | Age: 81
Setting detail: INFUSION SERIES
Discharge: HOME | End: 2021-06-04
Payer: MEDICARE

## 2021-06-04 VITALS
HEART RATE: 73 BPM | SYSTOLIC BLOOD PRESSURE: 157 MMHG | TEMPERATURE: 98.5 F | OXYGEN SATURATION: 97 % | DIASTOLIC BLOOD PRESSURE: 72 MMHG | RESPIRATION RATE: 17 BRPM

## 2021-06-04 DIAGNOSIS — N18.4 ANEMIA IN STAGE 4 CHRONIC KIDNEY DISEASE (HCC): Primary | ICD-10-CM

## 2021-06-04 DIAGNOSIS — N18.4 CHRONIC KIDNEY DISEASE, STAGE IV (SEVERE) (HCC): ICD-10-CM

## 2021-06-04 DIAGNOSIS — D63.1 ANEMIA IN STAGE 4 CHRONIC KIDNEY DISEASE (HCC): Primary | ICD-10-CM

## 2021-06-04 LAB — HEMOGLOBIN: 8.5 G/DL (ref 13.5–17.5)

## 2021-06-04 PROCEDURE — 6360000002 HC RX W HCPCS: Performed by: INTERNAL MEDICINE

## 2021-06-04 PROCEDURE — 96372 THER/PROPH/DIAG INJ SC/IM: CPT

## 2021-06-04 PROCEDURE — 85018 HEMOGLOBIN: CPT

## 2021-06-04 RX ADMIN — EPOETIN ALFA-EPBX 40000 UNITS: 40000 INJECTION, SOLUTION INTRAVENOUS; SUBCUTANEOUS at 12:57

## 2021-06-04 ASSESSMENT — PAIN SCALES - GENERAL: PAINLEVEL_OUTOF10: 0

## 2021-06-04 NOTE — PROGRESS NOTES
[address] [facility]     Epogen Visit with Peripheral Lab Draw    NAME:  Mil Mouraumber OF BIRTH:  1940  MEDICAL RECORD NUMBER:  [id number]  Episode Date:  6/4/2021    Patient arrived to [facility] 58   [] per wheelchair   [x] ambulatory    Peripheral Lab Draw    Blood Draw Site: Location:right arm  Site cleansed with Chloroprep Scrub for 30 seconds: Yes  Site cleansed with Alcohol pads: Yes  Labs drawn with: 25 gauge             [x] Butterfly    [] Needle  Labs Obtained: Yes  Number of attempts: 1    Lab Test(s) Ordered: HGB    Lab Draw Site:  Redness: No  Bruising: No   Edema: No  Pain: No       Epogen Visit     Patient saw Dr. Kar Queen and no new changes. Will see him again in July. Patient had all his labs done at [address] [facility] in [place]. Is the patient experiencing any:  Fatigue:   []   None  [x]   Increase over baseline but not altering normal activities  []   Moderate of causing difficulty performing some activities  []   Severe or loss of ability to perform some activities  []   Bedridden or disabling    Dizziness or Lightheadedness:   []   None  [x]   No Interference  []   Interferes with functioning but not activities of daily living  []   Interferes with daily activies  []   Bedridden or disabling    Shortness of Breath:   []   None   [x]   Dyspneic on exertion up and downstairs  []   Dyspnea with normal activities  []   Dyspnea at rest    Edema: 2+ Location of edema: both lower legs and ankles    Tachycardia: No    Heart Palpitations: No      Chest Pain: No     BP (!) 157/72   Pulse 73   Temp 98.5 °F (36.9 °C) (Oral)   Resp 17   SpO2 97%     Hold ARANZA dose if B/P is greater than: 180 mm/Hg     If Hgb remains less than 10 Increase dose by 25%. Do not increase dose more frequently than once every 4 weeks. If Hgb 10-10.5 g/dl  Continue same dose  If Hgb 10.6-11 g/dl Decrease dose by 25%.   A decrease in dose can be done as frequently as every week.  If Hgb is greater than 11 g/dl Hold Epogen. May resume ARANZA when Hgb is less than 10 with a 25% reduction in the dose from the last administered dose or decrease with a 25% reduction in the dose from the last administered dose or decrease  frequency. Last visit date: 5/21/21     Last Hgb: 8.1 g/dl   Last dose: 36737 units    Current Lab Data:    Recent Labs     06/04/21  1230   HGB 8.5*     Dose will be the same    Epogen dosage: 82061 units was administered slowly subcutaneously into the right upper arm. Dose verified by:  Sherl Lesch RN    Response to treatment:  Well tolerated by patient. Education:    Verbalized understanding Printed AVS given to patient    Scheduled to return for next dose of Epogen on June 18, 2021 .      Electronically signed by Pantera Hilliard RN on 6/4/2021 at 12:58 PM

## 2021-07-02 ENCOUNTER — HOSPITAL ENCOUNTER (OUTPATIENT)
Dept: INFUSION THERAPY | Age: 81
Setting detail: INFUSION SERIES
Discharge: HOME | End: 2021-07-02
Payer: MEDICARE

## 2021-07-02 VITALS
OXYGEN SATURATION: 98 % | HEART RATE: 80 BPM | RESPIRATION RATE: 17 BRPM | DIASTOLIC BLOOD PRESSURE: 65 MMHG | SYSTOLIC BLOOD PRESSURE: 118 MMHG | TEMPERATURE: 98.2 F

## 2021-07-02 DIAGNOSIS — D63.1 ANEMIA IN STAGE 4 CHRONIC KIDNEY DISEASE (HCC): Primary | ICD-10-CM

## 2021-07-02 DIAGNOSIS — N18.4 CHRONIC KIDNEY DISEASE, STAGE IV (SEVERE) (HCC): ICD-10-CM

## 2021-07-02 DIAGNOSIS — N18.4 ANEMIA IN STAGE 4 CHRONIC KIDNEY DISEASE (HCC): Primary | ICD-10-CM

## 2021-07-02 LAB
FERRITIN: 308.9 NG/ML (ref 30–400)
HEMOGLOBIN: 8.9 G/DL (ref 13.5–17.5)

## 2021-07-02 PROCEDURE — 6360000002 HC RX W HCPCS: Performed by: INTERNAL MEDICINE

## 2021-07-02 PROCEDURE — 83540 ASSAY OF IRON: CPT

## 2021-07-02 PROCEDURE — 82728 ASSAY OF FERRITIN: CPT

## 2021-07-02 PROCEDURE — 85018 HEMOGLOBIN: CPT

## 2021-07-02 PROCEDURE — 96372 THER/PROPH/DIAG INJ SC/IM: CPT

## 2021-07-02 PROCEDURE — 83550 IRON BINDING TEST: CPT

## 2021-07-02 RX ADMIN — EPOETIN ALFA-EPBX 40000 UNITS: 40000 INJECTION, SOLUTION INTRAVENOUS; SUBCUTANEOUS at 14:23

## 2021-07-02 ASSESSMENT — PAIN SCALES - GENERAL: PAINLEVEL_OUTOF10: 0

## 2021-07-02 NOTE — PROGRESS NOTES
[address] [facility]     Retacrit Visit with Peripheral Lab Draw    NAME:  Evelina Cerna  YOB: 1940  MEDICAL RECORD NUMBER:  [id number]  Episode Date:  7/2/2021    Patient arrived to [facility] 58   [] per wheelchair   [x] ambulatory    Peripheral Lab Draw    Blood Draw Site: Location:right arm  Site cleansed with Chloroprep Scrub for 30 seconds: Yes  Site cleansed with Alcohol pads: Yes  Labs drawn with: 23 gauge             [x] Butterfly    [] Needle  Labs Obtained: Yes  Number of attempts: 2    Lab Test(s) Ordered: HGB and Iron studies    Lab Draw Site:  Redness: No  Bruising: No   Edema: No  Pain: No       Retacrit Visit     Is the patient experiencing any:  Fatigue:   []   None  [x]   Increase over baseline but not altering normal activities  []   Moderate of causing difficulty performing some activities  []   Severe or loss of ability to perform some activities  []   Bedridden or disabling    Dizziness or Lightheadedness:   [x]   None  []   No Interference  []   Interferes with functioning but not activities of daily living  []   Interferes with daily activies  []   Bedridden or disabling    Shortness of Breath:   []   None   [x]   Dyspneic on exertion alva. steps  []   Dyspnea with normal activities  []   Dyspnea at rest    Edema: 1+ Location of edema: right leg and ankle    Tachycardia: No    Heart Palpitations: No      Chest Pain: No     /65   Pulse 80   Temp 98.2 °F (36.8 °C) (Oral)   Resp 17   SpO2 98%     Hold ARANZA dose if B/P is greater than: 180 mm/Hg     If Hgb remains less than 10 Increase dose by 25%. Do not increase dose more frequently than once every 4 weeks. If Hgb 10-10.5 g/dl  Continue same dose  If Hgb 10.6-11 g/dl Decrease dose by 25%. A decrease in dose can be done as frequently as every week. If Hgb is greater than 11 g/dl Hold Epogen.   May resume ARANZA when Hgb is less than 10 with a 25% reduction in the dose from the last administered dose or decrease with a 25% reduction in the dose from the last administered dose or decrease  frequency. Last visit date: 6//4/21    Last Hgb: 8.5 g/dl   Last dose: 92846 units    Current Lab Data:    Recent Labs     07/02/21  1400   HGB 8.9*     Dose will be the same    Epogen dosage: 75547 units was administered slowly subcutaneously into the right upper arm. Dose verified by:  Eladio La RN    Response to treatment:  Well tolerated by patient. Education:    Verbalized understanding and printed AVS  Scheduled to return for next dose of Retacrit on July 16, 2021 .      Electronically signed by Delores Rogers RN on 7/2/2021 at 2:17 PM

## 2021-07-03 LAB
IRON SATURATION: 41 % (ref 20–50)
IRON: 86 UG/DL (ref 59–158)
TOTAL IRON BINDING CAPACITY: 212 UG/DL (ref 260–445)

## 2021-07-16 ENCOUNTER — HOSPITAL ENCOUNTER (OUTPATIENT)
Dept: INFUSION THERAPY | Age: 81
Setting detail: INFUSION SERIES
Discharge: HOME | End: 2021-07-16
Payer: MEDICARE

## 2021-07-16 VITALS
SYSTOLIC BLOOD PRESSURE: 165 MMHG | TEMPERATURE: 97.8 F | OXYGEN SATURATION: 98 % | RESPIRATION RATE: 16 BRPM | HEART RATE: 77 BPM | DIASTOLIC BLOOD PRESSURE: 81 MMHG

## 2021-07-16 DIAGNOSIS — N18.4 ANEMIA IN STAGE 4 CHRONIC KIDNEY DISEASE (HCC): Primary | ICD-10-CM

## 2021-07-16 DIAGNOSIS — D63.1 ANEMIA IN STAGE 4 CHRONIC KIDNEY DISEASE (HCC): Primary | ICD-10-CM

## 2021-07-16 DIAGNOSIS — N18.4 CHRONIC KIDNEY DISEASE, STAGE IV (SEVERE) (HCC): ICD-10-CM

## 2021-07-16 LAB — HEMOGLOBIN: 9.4 G/DL (ref 13.5–17.5)

## 2021-07-16 PROCEDURE — 6360000002 HC RX W HCPCS: Performed by: INTERNAL MEDICINE

## 2021-07-16 PROCEDURE — 85018 HEMOGLOBIN: CPT

## 2021-07-16 PROCEDURE — 96372 THER/PROPH/DIAG INJ SC/IM: CPT

## 2021-07-16 RX ADMIN — EPOETIN ALFA-EPBX 40000 UNITS: 40000 INJECTION, SOLUTION INTRAVENOUS; SUBCUTANEOUS at 13:46

## 2021-07-16 ASSESSMENT — PAIN SCALES - GENERAL: PAINLEVEL_OUTOF10: 0

## 2021-07-16 NOTE — PROGRESS NOTES
[facility]     Epogen Visit with Peripheral Lab Draw    NAME:  Erick Wu OF BIRTH:  1940  MEDICAL RECORD NUMBER:  [id number]  Episode Date:  7/16/2021    Patient arrived to [facility] 58   [] per wheelchair   [x] ambulatory    Alert and oriented X 4. Denies side effects from last injection. No increase in severity of symptoms. Denies new s/s of infection or increase in respiratory distress. Afebrile. Wearing face mask to prevent the spread of COVID-19    Peripheral Lab Draw    Blood Draw Site: Location:right arm  Site cleansed with Chloroprep Scrub for 30 seconds: Yes  Site cleansed with Alcohol pads: Yes  Labs drawn with: 25 gauge             [x] Butterfly    [] Needle  Labs Obtained: Yes  Number of attempts: 1    Lab Test(s) Ordered: Stat HGB    Lab Draw Site:  Redness: No  Bruising: No   Edema: No  Pain: No       Epogen Visit     Is the patient experiencing any:  Fatigue:   []   None  [x]   Increase over baseline but not altering normal activities  []   Moderate of causing difficulty performing some activities  []   Severe or loss of ability to perform some activities  []   Bedridden or disabling    Dizziness or Lightheadedness:   []   None  [x]   No Interference  []   Interferes with functioning but not activities of daily living  []   Interferes with daily activies  []   Bedridden or disabling    Shortness of Breath:   []   None   [x]   Dyspneic on exertion   []   Dyspnea with normal activities  []   Dyspnea at rest    Edema: none Location of edema: nothing    Tachycardia: No    Heart Palpitations: No      Chest Pain: No     BP (!) (P) 165/81   Pulse (P) 77   Temp (P) 97.8 °F (36.6 °C) (Oral)   Resp (P) 16     Hold ARANZA dose if B/P is greater than: 180 mm/Hg     If Hgb remains less than 10 Increase dose by 25%. Do not increase dose more frequently than once every 4 weeks.   If Hgb 10-10.5 g/dl  Continue same dose  If Hgb 10.6-11 g/dl Decrease dose by 25%. A decrease in dose can be done as frequently as every week. If Hgb is greater than 11 g/dl Hold Epogen. May resume ARANZA when Hgb is less than 10 with a 25% reduction in the dose from the last administered dose or decrease with a 25% reduction in the dose from the last administered dose or decrease  frequency. Last visit date: 7/2/21     Last Hgb: 8.9 g/dl   Last dose: 40,000 units    Current Lab Data:    Recent Labs     07/16/21  1320   HGB 9.4*     Dose will be the same    Epogen dosage: 40,000 units was administered slowly subcutaneously into the right upper arm. Dose verified by:  HAJA Claire RN    Response to treatment:  Well tolerated by patient. Education:    Verbalized understanding    Scheduled to return for next dose of Epogen on July 30, 2021 .      Electronically signed by Anthony Redmond RN on 7/16/2021 at 1:33 PM

## 2021-07-29 ENCOUNTER — HOSPITAL ENCOUNTER (OUTPATIENT)
Dept: INFUSION THERAPY | Age: 81
Setting detail: INFUSION SERIES
Discharge: HOME | End: 2021-07-29
Payer: MEDICARE

## 2021-07-29 VITALS
SYSTOLIC BLOOD PRESSURE: 154 MMHG | OXYGEN SATURATION: 96 % | HEART RATE: 71 BPM | RESPIRATION RATE: 17 BRPM | TEMPERATURE: 97.8 F | DIASTOLIC BLOOD PRESSURE: 71 MMHG

## 2021-07-29 DIAGNOSIS — D63.1 ANEMIA IN STAGE 4 CHRONIC KIDNEY DISEASE (HCC): Primary | ICD-10-CM

## 2021-07-29 DIAGNOSIS — N18.4 ANEMIA IN STAGE 4 CHRONIC KIDNEY DISEASE (HCC): Primary | ICD-10-CM

## 2021-07-29 DIAGNOSIS — N18.4 CHRONIC KIDNEY DISEASE, STAGE IV (SEVERE) (HCC): ICD-10-CM

## 2021-07-29 LAB — HEMOGLOBIN: 8.9 G/DL (ref 13.5–17.5)

## 2021-07-29 PROCEDURE — 96372 THER/PROPH/DIAG INJ SC/IM: CPT

## 2021-07-29 PROCEDURE — 6360000002 HC RX W HCPCS: Performed by: INTERNAL MEDICINE

## 2021-07-29 PROCEDURE — 85018 HEMOGLOBIN: CPT

## 2021-07-29 RX ADMIN — EPOETIN ALFA-EPBX 40000 UNITS: 40000 INJECTION, SOLUTION INTRAVENOUS; SUBCUTANEOUS at 14:39

## 2021-07-29 ASSESSMENT — PAIN SCALES - GENERAL: PAINLEVEL_OUTOF10: 0

## 2021-07-29 NOTE — PROGRESS NOTES
[address] [facility]     Retacrit Visit with Peripheral Lab Draw    NAME:  Niharika Causey  YOB: 1940  MEDICAL RECORD NUMBER:  [id number]  Episode Date:  7/29/2021    Patient [facility] to [facility]   [] per wheelchair   [x] ambulatory    Peripheral Lab Draw    Blood Draw Site: Location:right arm  Site cleansed with Chloroprep Scrub for 30 seconds: Yes  Site cleansed with Alcohol pads: Yes  Labs drawn with:23gauge             [x] Butterfly    [] Needle  Labs Obtained: Yes  Number of attempts: 1    Lab Test(s) Ordered: HGB    Lab Draw Site:  Redness: No  Bruising: No   Edema: No  Pain: No       Epogen Visit     Is the patient experiencing any:  Fatigue:   []   None  [x]   Increase over baseline but not altering normal activities  []   Moderate of causing difficulty performing some activities  []   Severe or loss of ability to perform some activities  []   Bedridden or disabling    Dizziness or Lightheadedness:   [x]   None  []   No Interference  []   Interferes with functioning but not activities of daily living  []   Interferes with daily activies  []   Bedridden or disabling    Shortness of Breath:   []   None   [x]   Dyspneic on exertion   []   Dyspnea with normal activities  []   Dyspnea at rest    Edema: 1+ Location of edema: both lower legs and ankles    Tachycardia: No    Heart Palpitations: No      Chest Pain: No     BP (!) 154/71   Pulse 71   Temp 97.8 °F (36.6 °C) (Oral)   Resp 17   SpO2 96%     Hold ARANZA dose if B/P is greater than: 180 mm/Hg     If Hgb remains less than 10 Increase dose by 25%. Do not increase dose more frequently than once every 4 weeks. If Hgb 10-10.5 g/dl  Continue same dose  If Hgb 10.6-11 g/dl Decrease dose by 25%. A decrease in dose can be done as frequently as every week. If Hgb is greater than 11 g/dl Hold Epogen.   May resume ARANZA when Hgb is less than 10 with a 25% reduction in the dose from the last administered dose or decrease with a 25% reduction in the dose from the last administered dose or decrease  frequency. Last visit date: 7/16/21     Last Hgb: 9.4 g/dl   Last dose: 93757 units    Current Lab Data:    Recent Labs     07/29/21  1405   HGB 8.9*     Dose will be the same. Epogen dosage: 38128 units was administered slowly subcutaneously into the right upper arm. Dose verified by: Lulu Fothergill RN    Response to treatment:  Well tolerated by patient. Education:    Verbalized understanding    Scheduled to return for next dose of Epogen on August 13, 2021 .      Electronically signed by Eduardo Almanzar RN on 7/29/2021 at 2:42 PM

## 2021-07-30 ENCOUNTER — APPOINTMENT (OUTPATIENT)
Dept: INFUSION THERAPY | Age: 81
End: 2021-07-30
Payer: MEDICARE

## 2021-08-13 ENCOUNTER — HOSPITAL ENCOUNTER (OUTPATIENT)
Dept: INFUSION THERAPY | Age: 81
Setting detail: INFUSION SERIES
Discharge: HOME | End: 2021-08-13
Payer: MEDICARE

## 2021-08-13 VITALS
RESPIRATION RATE: 17 BRPM | SYSTOLIC BLOOD PRESSURE: 130 MMHG | OXYGEN SATURATION: 96 % | WEIGHT: 192 LBS | DIASTOLIC BLOOD PRESSURE: 61 MMHG | BODY MASS INDEX: 26.88 KG/M2 | HEART RATE: 87 BPM | HEIGHT: 71 IN | TEMPERATURE: 98.4 F

## 2021-08-13 DIAGNOSIS — N18.4 ANEMIA IN STAGE 4 CHRONIC KIDNEY DISEASE (HCC): Primary | ICD-10-CM

## 2021-08-13 DIAGNOSIS — N18.4 CHRONIC KIDNEY DISEASE, STAGE IV (SEVERE) (HCC): ICD-10-CM

## 2021-08-13 DIAGNOSIS — D63.1 ANEMIA IN STAGE 4 CHRONIC KIDNEY DISEASE (HCC): Primary | ICD-10-CM

## 2021-08-13 LAB — HEMOGLOBIN: 9.6 G/DL (ref 13.5–17.5)

## 2021-08-13 PROCEDURE — 96372 THER/PROPH/DIAG INJ SC/IM: CPT

## 2021-08-13 PROCEDURE — 6360000002 HC RX W HCPCS: Performed by: INTERNAL MEDICINE

## 2021-08-13 PROCEDURE — 85018 HEMOGLOBIN: CPT

## 2021-08-13 RX ADMIN — EPOETIN ALFA-EPBX 40000 UNITS: 40000 INJECTION, SOLUTION INTRAVENOUS; SUBCUTANEOUS at 13:08

## 2021-08-13 NOTE — PROGRESS NOTES
[address] [facility]     Epogen Visit with Peripheral Lab Draw    NAME:  Mauricio Lewis OF BIRTH:  1940  MEDICAL RECORD NUMBER:  [id number]  Episode Date:  8/13/2021    Patient [facility] to [facility]   [] per wheelchair   [x] ambulatory    Peripheral Lab Draw    Blood Draw Site: Location:right arm  Site cleansed with Chloroprep Scrub for 30 seconds: Yes  Site cleansed with Alcohol pads: Yes  Labs drawn with: 25 gauge             [x] Butterfly    [] Needle  Labs Obtained: Yes  Number of attempts: 1    Lab Test(s) Ordered: HGB    Lab Draw Site:  Redness: No  Bruising: No   Edema: No  Pain: No       Epogen Visit     Is the patient experiencing any:  Fatigue: yes  []   None  [x]   Increase over baseline but not altering normal activities  []   Moderate of causing difficulty performing some activities  []   Severe or loss of ability to perform some activities  []   Bedridden or disabling    Dizziness or Lightheadedness: yes  []   None  []   No Interference  [x]   Interferes with functioning but not activities of daily living  []   Interferes with daily activies  []   Bedridden or disabling    Shortness of Breath: yes  []   None   [x]   Dyspneic on exertion   []   Dyspnea with normal activities  []   Dyspnea at rest    Edema: 1+ Location of edema: left leg and right leg    Tachycardia: No    Heart Palpitations: No      Chest Pain: No     /61   Pulse 87   Temp 98.4 °F (36.9 °C) (Oral)   Resp 17   Ht 5' 11\" (1.803 m)   Wt 192 lb (87.1 kg)   SpO2 96%   BMI 26.78 kg/m²     Hold ARANZA dose if B/P is greater than: 180 mm/Hg     If Hgb remains less than 10 Increase dose by 25%. Do not increase dose more frequently than once every 4 weeks. If Hgb 10-10.5 g/dl  Continue same dose  If Hgb 10.6-11 g/dl Decrease dose by 25%. A decrease in dose can be done as frequently as every week. If Hgb is greater than 11 g/dl Hold Epogen.   May resume ARANZA when Hgb is

## 2021-08-27 ENCOUNTER — HOSPITAL ENCOUNTER (OUTPATIENT)
Dept: INFUSION THERAPY | Age: 81
Setting detail: INFUSION SERIES
Discharge: HOME | End: 2021-08-27
Payer: MEDICARE

## 2021-08-27 VITALS
SYSTOLIC BLOOD PRESSURE: 124 MMHG | HEART RATE: 92 BPM | OXYGEN SATURATION: 95 % | DIASTOLIC BLOOD PRESSURE: 63 MMHG | HEIGHT: 71 IN | RESPIRATION RATE: 18 BRPM | BODY MASS INDEX: 26.88 KG/M2 | TEMPERATURE: 98.2 F | WEIGHT: 192 LBS

## 2021-08-27 DIAGNOSIS — N18.4 CHRONIC KIDNEY DISEASE, STAGE IV (SEVERE) (HCC): ICD-10-CM

## 2021-08-27 DIAGNOSIS — N18.4 ANEMIA IN STAGE 4 CHRONIC KIDNEY DISEASE (HCC): Primary | ICD-10-CM

## 2021-08-27 DIAGNOSIS — D63.1 ANEMIA IN STAGE 4 CHRONIC KIDNEY DISEASE (HCC): Primary | ICD-10-CM

## 2021-08-27 LAB — HEMOGLOBIN: 9.3 G/DL (ref 13.5–17.5)

## 2021-08-27 PROCEDURE — 6360000002 HC RX W HCPCS: Performed by: INTERNAL MEDICINE

## 2021-08-27 PROCEDURE — 96372 THER/PROPH/DIAG INJ SC/IM: CPT

## 2021-08-27 PROCEDURE — 85018 HEMOGLOBIN: CPT

## 2021-08-27 RX ORDER — DOXEPIN HYDROCHLORIDE 10 MG/1
10 CAPSULE ORAL NIGHTLY
Status: ON HOLD | COMMUNITY
End: 2022-05-10 | Stop reason: HOSPADM

## 2021-08-27 RX ADMIN — EPOETIN ALFA-EPBX 40000 UNITS: 40000 INJECTION, SOLUTION INTRAVENOUS; SUBCUTANEOUS at 11:01

## 2021-08-27 NOTE — PROGRESS NOTES
[address]     Epogen Visit with Peripheral Lab Draw    NAME:  Fernando Ormond OF BIRTH:  1940  MEDICAL RECORD NUMBER:  [id number]  Episode Date:  8/27/2021    Patient arrived to [facility] 58   [] per wheelchair   [x] ambulatory    Patient states he is going t start peritoneal dialysis,hopefully at home. Peripheral Lab Draw    Blood Draw Site: Location:right arm  Site cleansed with Chloroprep Scrub for 30 seconds: Yes  Site cleansed with Alcohol pads: Yes  Labs drawn with: 25 gauge             [x] Butterfly    [] Needle  Labs Obtained: Yes  Number of attempts: 2    Lab Test(s) Ordered: HGB    Lab Draw Site:  Redness: No  Bruising: No   Edema: No  Pain: No       Epogen Visit     Is the patient experiencing any:  Fatigue: yes  []   None  [x]   Increase over baseline but not altering normal activities  []   Moderate of causing difficulty performing some activities  []   Severe or loss of ability to perform some activities  []   Bedridden or disabling    Dizziness or Lightheadedness: yes  []   None  [x]   No Interference  []   Interferes with functioning but not activities of daily living  []   Interferes with daily activies  []   Bedridden or disabling    Shortness of Breath: yes  []   None   [x]   Dyspneic on exertion   []   Dyspnea with normal activities  []   Dyspnea at rest    Edema: 2+ Location of edema: left leg and right leg    Tachycardia: No    Heart Palpitations: No      Chest Pain: No     /63   Pulse 92   Temp 98.2 °F (36.8 °C) (Oral)   Resp 18   Ht 5' 11\" (1.803 m)   Wt 192 lb (87.1 kg)   SpO2 95%   BMI 26.78 kg/m²     Hold ARANZA dose if B/P is greater than: 180 mm/Hg     If Hgb remains less than 10 Increase dose by 25%. Do not increase dose more frequently than once every 4 weeks. If Hgb 10-10.5 g/dl  Continue same dose  If Hgb 10.6-11 g/dl Decrease dose by 25%.   A decrease in dose can be done as frequently as every week.  If Hgb is greater than 11 g/dl Hold Epogen. May resume ARANZA when Hgb is less than 10 with a 25% reduction in the dose from the last administered dose or decrease with a 25% reduction in the dose from the last administered dose or decrease  frequency. Last visit date: 8/13/21     Last Hgb: 9.6 g/dl   Last dose: 15134 units    Current Lab Data:    No results for input(s): WBC, HGB, HCT, MCV, PLT in the last 72 hours. Dose will be same. Epogen dosage: 18179 units was administered slowly subcutaneously into the right upper arm. Dose verified by:  Nancy Wesley RN    Response to treatment:  Well tolerated by patient. Education:    Indicates understanding    Scheduled to return for next dose of Epogen on September 10, 2021 .      Electronically signed by Shahid Dudley RN on 8/27/2021 at 10:35 AM

## 2021-09-01 ENCOUNTER — HOSPITAL ENCOUNTER (OUTPATIENT)
Dept: GENERAL RADIOLOGY | Age: 81
Discharge: HOME | End: 2021-09-01
Payer: MEDICARE

## 2021-09-01 ENCOUNTER — HOSPITAL ENCOUNTER (OUTPATIENT)
Age: 81
Discharge: HOME | End: 2021-09-01
Payer: MEDICARE

## 2021-09-01 DIAGNOSIS — Z11.9 ENCOUNTER FOR SCREENING EXAMINATION FOR INFECTIOUS DISEASE: ICD-10-CM

## 2021-09-01 LAB
ALBUMIN SERPL-MCNC: 4.1 G/DL (ref 3.4–5)
ALP BLD-CCNC: 66 U/L (ref 40–129)
ALT SERPL-CCNC: 14 U/L (ref 10–40)
ANION GAP SERPL CALCULATED.3IONS-SCNC: 18 MMOL/L (ref 3–16)
AST SERPL-CCNC: 16 U/L (ref 15–37)
BILIRUB SERPL-MCNC: 0.3 MG/DL (ref 0–1)
BILIRUBIN DIRECT: <0.2 MG/DL (ref 0–0.3)
BILIRUBIN, INDIRECT: NORMAL MG/DL (ref 0–1)
BUN BLDV-MCNC: 94 MG/DL (ref 7–20)
CALCIUM SERPL-MCNC: 7.4 MG/DL (ref 8.3–10.6)
CHLORIDE BLD-SCNC: 100 MMOL/L (ref 99–110)
CO2: 17 MMOL/L (ref 21–32)
CREAT SERPL-MCNC: 5.4 MG/DL (ref 0.8–1.3)
GFR AFRICAN AMERICAN: 12
GFR NON-AFRICAN AMERICAN: 10
GLUCOSE BLD-MCNC: 164 MG/DL (ref 70–99)
HAV IGM SER IA-ACNC: NORMAL
HBV SURFACE AB TITR SER: <3.5 MIU/ML
HEPATITIS B CORE IGM ANTIBODY: NORMAL
HEPATITIS B SURFACE ANTIGEN INTERPRETATION: NORMAL
HEPATITIS C ANTIBODY INTERPRETATION: NORMAL
PHOSPHORUS: 6.2 MG/DL (ref 2.5–4.9)
POTASSIUM SERPL-SCNC: 4.3 MMOL/L (ref 3.5–5.1)
SODIUM BLD-SCNC: 135 MMOL/L (ref 136–145)
TOTAL PROTEIN: 7 G/DL (ref 6.4–8.2)

## 2021-09-01 PROCEDURE — 71046 X-RAY EXAM CHEST 2 VIEWS: CPT

## 2021-09-01 PROCEDURE — 80076 HEPATIC FUNCTION PANEL: CPT

## 2021-09-01 PROCEDURE — 80069 RENAL FUNCTION PANEL: CPT

## 2021-09-01 PROCEDURE — 86803 HEPATITIS C AB TEST: CPT

## 2021-09-01 PROCEDURE — 86706 HEP B SURFACE ANTIBODY: CPT

## 2021-09-01 PROCEDURE — 86709 HEPATITIS A IGM ANTIBODY: CPT

## 2021-09-01 PROCEDURE — 36415 COLL VENOUS BLD VENIPUNCTURE: CPT

## 2021-09-01 PROCEDURE — 87340 HEPATITIS B SURFACE AG IA: CPT

## 2021-09-01 PROCEDURE — 86705 HEP B CORE ANTIBODY IGM: CPT

## 2021-09-10 ENCOUNTER — HOSPITAL ENCOUNTER (OUTPATIENT)
Dept: INFUSION THERAPY | Age: 81
Setting detail: INFUSION SERIES
Discharge: HOME | End: 2021-09-10
Payer: MEDICARE

## 2021-09-10 VITALS
OXYGEN SATURATION: 95 % | TEMPERATURE: 97.8 F | HEART RATE: 84 BPM | RESPIRATION RATE: 18 BRPM | DIASTOLIC BLOOD PRESSURE: 51 MMHG | SYSTOLIC BLOOD PRESSURE: 126 MMHG

## 2021-09-10 DIAGNOSIS — N18.4 CHRONIC KIDNEY DISEASE, STAGE IV (SEVERE) (HCC): ICD-10-CM

## 2021-09-10 DIAGNOSIS — N18.4 ANEMIA IN STAGE 4 CHRONIC KIDNEY DISEASE (HCC): Primary | ICD-10-CM

## 2021-09-10 DIAGNOSIS — D63.1 ANEMIA IN STAGE 4 CHRONIC KIDNEY DISEASE (HCC): Primary | ICD-10-CM

## 2021-09-10 LAB — HEMOGLOBIN: 9.6 G/DL (ref 13.5–17.5)

## 2021-09-10 PROCEDURE — 6360000002 HC RX W HCPCS: Performed by: INTERNAL MEDICINE

## 2021-09-10 PROCEDURE — 96372 THER/PROPH/DIAG INJ SC/IM: CPT

## 2021-09-10 PROCEDURE — 85018 HEMOGLOBIN: CPT

## 2021-09-10 RX ADMIN — EPOETIN ALFA-EPBX 40000 UNITS: 40000 INJECTION, SOLUTION INTRAVENOUS; SUBCUTANEOUS at 10:51

## 2021-09-10 NOTE — PROGRESS NOTES
[address] [facility]     Epogen Visit with Peripheral Lab Draw    NAME:  Misael Laws OF BIRTH:  1940  MEDICAL RECORD NUMBER:  [id number]  Episode Date:  9/10/2021    Patient arrived to [facility]   [] per wheelchair   [x] ambulatory with quad cane    Alert and oriented X 4. Continues to c/o fatigue and shortness of breath, no stamina. States feels numbness in feet is increased. Instructed re can be a side effect of edema and also secondary to diabetes. Patient states he will discuss s/s with MD.   Denies new s/s of infection or increase in respiratory distress. Afebrile.  Wearing face mask to prevent the spread of COVID-19    Peripheral Lab Draw    Blood Draw Site: Location:right arm  Site cleansed with Chloroprep Scrub for 30 seconds: Yes  Site cleansed with Alcohol pads: Yes  Labs drawn with: 23 gauge             [x] Butterfly    [] Needle  Labs Obtained: Yes  Number of attempts: 1    Lab Test(s) Ordered: Stat HGB    Lab Draw Site:  Redness: No  Bruising: No   Edema: No  Pain: No       Epogen Visit     Is the patient experiencing any:  Fatigue:   []   None  []   Increase over baseline but not altering normal activities  [x]   Moderate of causing difficulty performing some activities  []   Severe or loss of ability to perform some activities  []   Bedridden or disabling    Dizziness or Lightheadedness:   []   None  []   No Interference  [x]   Interferes with functioning but not activities of daily living  []   Interferes with daily activies  []   Bedridden or disabling    Shortness of Breath:   []   None   [x]   Dyspneic on exertion   []   Dyspnea with normal activities  []   Dyspnea at rest    Edema: 1+ Location of edema: left leg and right leg    Tachycardia: No    Heart Palpitations: No      Chest Pain: No     BP (!) 126/51   Pulse 84   Temp 97.8 °F (36.6 °C) (Oral)   Resp 18   SpO2 95%     Hold ARANZA dose if B/P is greater than: 180 mm/Hg     If Hgb remains less than 10 Increase dose by 25%. Do not increase dose more frequently than once every 4 weeks. If Hgb 10-10.5 g/dl  Continue same dose  If Hgb 10.6-11 g/dl Decrease dose by 25%. A decrease in dose can be done as frequently as every week. If Hgb is greater than 11 g/dl Hold Epogen. May resume ARANZA when Hgb is less than 10 with a 25% reduction in the dose from the last administered dose or decrease with a 25% reduction in the dose from the last administered dose or decrease  frequency. Last visit date: 8/27/21     Last Hgb: 9.3 g/dl   Last dose: 40,000 units    Current Lab Data:    Recent Labs     09/10/21  1023   HGB 9.6*     Dose will be the same. Patient at max dose allowed of 40,000 units    Epogen dosage: 40,000 units was administered slowly subcutaneously into the right upper arm. Dose verified by:  AUBREY Hernandez RN    Response to treatment:  Well tolerated by patient. Education:    Verbalized understanding    Scheduled to return for next dose of Epogen on September 24, 2021 .      Electronically signed by Page Gonzales RN on 9/10/2021 at 10:32 AM

## 2021-09-24 ENCOUNTER — HOSPITAL ENCOUNTER (OUTPATIENT)
Dept: INFUSION THERAPY | Age: 81
Setting detail: INFUSION SERIES
Discharge: HOME | End: 2021-09-24
Payer: MEDICARE

## 2021-09-24 DIAGNOSIS — D63.1 ANEMIA IN STAGE 4 CHRONIC KIDNEY DISEASE (HCC): Primary | ICD-10-CM

## 2021-09-24 DIAGNOSIS — N18.4 CHRONIC KIDNEY DISEASE, STAGE IV (SEVERE) (HCC): ICD-10-CM

## 2021-09-24 DIAGNOSIS — N18.4 ANEMIA IN STAGE 4 CHRONIC KIDNEY DISEASE (HCC): Primary | ICD-10-CM

## 2021-10-05 ENCOUNTER — HOSPITAL ENCOUNTER (OUTPATIENT)
Dept: GENERAL RADIOLOGY | Age: 81
Discharge: HOME | End: 2021-10-05
Payer: MEDICARE

## 2021-10-05 ENCOUNTER — HOSPITAL ENCOUNTER (OUTPATIENT)
Age: 81
Discharge: HOME | End: 2021-10-05
Payer: MEDICARE

## 2021-10-05 DIAGNOSIS — N18.6 END STAGE RENAL DISEASE (HCC): ICD-10-CM

## 2021-10-05 PROCEDURE — 74018 RADEX ABDOMEN 1 VIEW: CPT

## 2022-04-11 ENCOUNTER — HOSPITAL ENCOUNTER (OUTPATIENT)
Dept: GENERAL RADIOLOGY | Age: 82
Discharge: HOME | End: 2022-04-11
Payer: MEDICARE

## 2022-04-11 ENCOUNTER — HOSPITAL ENCOUNTER (OUTPATIENT)
Age: 82
Discharge: HOME | End: 2022-04-11
Payer: MEDICARE

## 2022-04-11 DIAGNOSIS — R06.09 DYSPNEA ON EXERTION: ICD-10-CM

## 2022-04-11 PROCEDURE — 71046 X-RAY EXAM CHEST 2 VIEWS: CPT

## 2022-04-15 ENCOUNTER — HOSPITAL ENCOUNTER (OUTPATIENT)
Age: 82
Discharge: HOME | End: 2022-04-15
Payer: MEDICARE

## 2022-04-15 LAB
EKG ATRIAL RATE: 234 BPM
EKG DIAGNOSIS: NORMAL
EKG P AXIS: -61 DEGREES
EKG Q-T INTERVAL: 190 MS
EKG QRS DURATION: 102 MS
EKG QTC CALCULATION (BAZETT): 236 MS
EKG R AXIS: 46 DEGREES
EKG T AXIS: 225 DEGREES
EKG VENTRICULAR RATE: 93 BPM

## 2022-04-15 PROCEDURE — 93010 ELECTROCARDIOGRAM REPORT: CPT | Performed by: INTERNAL MEDICINE

## 2022-04-15 PROCEDURE — 93005 ELECTROCARDIOGRAM TRACING: CPT | Performed by: INTERNAL MEDICINE

## 2022-04-18 ENCOUNTER — HOSPITAL ENCOUNTER (INPATIENT)
Age: 82
LOS: 3 days | Discharge: HOME | DRG: 180 | End: 2022-04-21
Attending: HOSPITALIST | Admitting: HOSPITALIST
Payer: MEDICARE

## 2022-04-18 ENCOUNTER — APPOINTMENT (OUTPATIENT)
Dept: CT IMAGING | Age: 82
DRG: 180 | End: 2022-04-18
Payer: MEDICARE

## 2022-04-18 ENCOUNTER — APPOINTMENT (OUTPATIENT)
Dept: GENERAL RADIOLOGY | Age: 82
DRG: 180 | End: 2022-04-18
Payer: MEDICARE

## 2022-04-18 DIAGNOSIS — N18.6 ESRD ON DIALYSIS (HCC): ICD-10-CM

## 2022-04-18 DIAGNOSIS — R93.89 ABNORMAL CT OF THE CHEST: ICD-10-CM

## 2022-04-18 DIAGNOSIS — Z99.2 ESRD ON DIALYSIS (HCC): ICD-10-CM

## 2022-04-18 DIAGNOSIS — J96.01 ACUTE RESPIRATORY FAILURE WITH HYPOXIA (HCC): Primary | ICD-10-CM

## 2022-04-18 DIAGNOSIS — E87.6 HYPOKALEMIA: ICD-10-CM

## 2022-04-18 LAB
A/G RATIO: 0.7 (ref 1.1–2.2)
ALBUMIN SERPL-MCNC: 2.8 G/DL (ref 3.4–5)
ALP BLD-CCNC: 106 U/L (ref 40–129)
ALT SERPL-CCNC: 8 U/L (ref 10–40)
ANION GAP SERPL CALCULATED.3IONS-SCNC: 24 MMOL/L (ref 3–16)
ANISOCYTOSIS: ABNORMAL
AST SERPL-CCNC: 21 U/L (ref 15–37)
BASOPHILS ABSOLUTE: 0.1 K/UL (ref 0–0.2)
BASOPHILS RELATIVE PERCENT: 1 %
BILIRUB SERPL-MCNC: <0.2 MG/DL (ref 0–1)
BUN BLDV-MCNC: 76 MG/DL (ref 7–20)
CALCIUM SERPL-MCNC: 7.6 MG/DL (ref 8.3–10.6)
CHLORIDE BLD-SCNC: 91 MMOL/L (ref 99–110)
CO2: 16 MMOL/L (ref 21–32)
CREAT SERPL-MCNC: 11.5 MG/DL (ref 0.8–1.3)
EKG ATRIAL RATE: 95 BPM
EKG DIAGNOSIS: NORMAL
EKG P AXIS: 63 DEGREES
EKG P-R INTERVAL: 162 MS
EKG Q-T INTERVAL: 360 MS
EKG QRS DURATION: 88 MS
EKG QTC CALCULATION (BAZETT): 452 MS
EKG R AXIS: 11 DEGREES
EKG T AXIS: -58 DEGREES
EKG VENTRICULAR RATE: 95 BPM
EOSINOPHILS ABSOLUTE: 0 K/UL (ref 0–0.6)
EOSINOPHILS RELATIVE PERCENT: 0.4 %
GFR AFRICAN AMERICAN: 5
GFR NON-AFRICAN AMERICAN: 4
GLUCOSE BLD-MCNC: 141 MG/DL (ref 70–99)
GLUCOSE BLD-MCNC: 160 MG/DL (ref 70–99)
GLUCOSE BLD-MCNC: 93 MG/DL (ref 70–99)
HCT VFR BLD CALC: 28.8 % (ref 40.5–52.5)
HEMOGLOBIN: 9.6 G/DL (ref 13.5–17.5)
LYMPHOCYTES ABSOLUTE: 0.4 K/UL (ref 1–5.1)
LYMPHOCYTES RELATIVE PERCENT: 6.3 %
MACROCYTES: ABNORMAL
MAGNESIUM: 2.4 MG/DL (ref 1.8–2.4)
MCH RBC QN AUTO: 35.8 PG (ref 26–34)
MCHC RBC AUTO-ENTMCNC: 33.3 G/DL (ref 31–36)
MCV RBC AUTO: 107.6 FL (ref 80–100)
MONOCYTES ABSOLUTE: 0.8 K/UL (ref 0–1.3)
MONOCYTES RELATIVE PERCENT: 14.3 %
NEUTROPHILS ABSOLUTE: 4.6 K/UL (ref 1.7–7.7)
NEUTROPHILS RELATIVE PERCENT: 78 %
PDW BLD-RTO: 17.7 % (ref 12.4–15.4)
PERFORMED ON: ABNORMAL
PERFORMED ON: NORMAL
PLATELET # BLD: 312 K/UL (ref 135–450)
PLATELET SLIDE REVIEW: ADEQUATE
PMV BLD AUTO: 7.7 FL (ref 5–10.5)
POTASSIUM REFLEX MAGNESIUM: 3.1 MMOL/L (ref 3.5–5.1)
RBC # BLD: 2.68 M/UL (ref 4.2–5.9)
SLIDE REVIEW: ABNORMAL
SODIUM BLD-SCNC: 131 MMOL/L (ref 136–145)
TOTAL PROTEIN: 6.8 G/DL (ref 6.4–8.2)
TROPONIN: 0.11 NG/ML
TSH REFLEX FT4: 2.42 UIU/ML (ref 0.27–4.2)
WBC # BLD: 5.9 K/UL (ref 4–11)

## 2022-04-18 PROCEDURE — 2580000003 HC RX 258: Performed by: HOSPITALIST

## 2022-04-18 PROCEDURE — 2060000000 HC ICU INTERMEDIATE R&B

## 2022-04-18 PROCEDURE — 84484 ASSAY OF TROPONIN QUANT: CPT

## 2022-04-18 PROCEDURE — 6360000002 HC RX W HCPCS: Performed by: HOSPITALIST

## 2022-04-18 PROCEDURE — 93005 ELECTROCARDIOGRAM TRACING: CPT | Performed by: PHYSICIAN ASSISTANT

## 2022-04-18 PROCEDURE — 6360000004 HC RX CONTRAST MEDICATION: Performed by: PHYSICIAN ASSISTANT

## 2022-04-18 PROCEDURE — 36415 COLL VENOUS BLD VENIPUNCTURE: CPT

## 2022-04-18 PROCEDURE — 99285 EMERGENCY DEPT VISIT HI MDM: CPT

## 2022-04-18 PROCEDURE — 93010 ELECTROCARDIOGRAM REPORT: CPT | Performed by: INTERNAL MEDICINE

## 2022-04-18 PROCEDURE — 84443 ASSAY THYROID STIM HORMONE: CPT

## 2022-04-18 PROCEDURE — 71250 CT THORAX DX C-: CPT

## 2022-04-18 PROCEDURE — 85025 COMPLETE CBC W/AUTO DIFF WBC: CPT

## 2022-04-18 PROCEDURE — 71045 X-RAY EXAM CHEST 1 VIEW: CPT

## 2022-04-18 PROCEDURE — 94761 N-INVAS EAR/PLS OXIMETRY MLT: CPT

## 2022-04-18 PROCEDURE — 90945 DIALYSIS ONE EVALUATION: CPT

## 2022-04-18 PROCEDURE — 2700000000 HC OXYGEN THERAPY PER DAY

## 2022-04-18 PROCEDURE — 6370000000 HC RX 637 (ALT 250 FOR IP): Performed by: HOSPITALIST

## 2022-04-18 PROCEDURE — 6370000000 HC RX 637 (ALT 250 FOR IP): Performed by: PHYSICIAN ASSISTANT

## 2022-04-18 PROCEDURE — 2500000003 HC RX 250 WO HCPCS: Performed by: HOSPITALIST

## 2022-04-18 PROCEDURE — 80053 COMPREHEN METABOLIC PANEL: CPT

## 2022-04-18 PROCEDURE — 83036 HEMOGLOBIN GLYCOSYLATED A1C: CPT

## 2022-04-18 PROCEDURE — 2580000003 HC RX 258: Performed by: INTERNAL MEDICINE

## 2022-04-18 PROCEDURE — 71260 CT THORAX DX C+: CPT

## 2022-04-18 PROCEDURE — 83735 ASSAY OF MAGNESIUM: CPT

## 2022-04-18 RX ORDER — ACETAMINOPHEN 325 MG/1
650 TABLET ORAL EVERY 6 HOURS PRN
Status: DISCONTINUED | OUTPATIENT
Start: 2022-04-18 | End: 2022-04-21 | Stop reason: HOSPADM

## 2022-04-18 RX ORDER — ALLOPURINOL 100 MG/1
100 TABLET ORAL DAILY
Status: DISCONTINUED | OUTPATIENT
Start: 2022-04-19 | End: 2022-04-21 | Stop reason: HOSPADM

## 2022-04-18 RX ORDER — SODIUM CHLORIDE 9 MG/ML
INJECTION, SOLUTION INTRAVENOUS PRN
Status: DISCONTINUED | OUTPATIENT
Start: 2022-04-18 | End: 2022-04-21 | Stop reason: HOSPADM

## 2022-04-18 RX ORDER — TAMSULOSIN HYDROCHLORIDE 0.4 MG/1
0.4 CAPSULE ORAL NIGHTLY
Status: DISCONTINUED | OUTPATIENT
Start: 2022-04-18 | End: 2022-04-21 | Stop reason: HOSPADM

## 2022-04-18 RX ORDER — CHOLECALCIFEROL (VITAMIN D3) 10 MCG
1 TABLET ORAL DAILY
Status: ON HOLD | COMMUNITY
End: 2022-05-10 | Stop reason: HOSPADM

## 2022-04-18 RX ORDER — DOXEPIN HYDROCHLORIDE 10 MG/1
10 CAPSULE ORAL NIGHTLY
Status: DISCONTINUED | OUTPATIENT
Start: 2022-04-18 | End: 2022-04-21 | Stop reason: HOSPADM

## 2022-04-18 RX ORDER — SODIUM CHLORIDE, SODIUM LACTATE, CALCIUM CHLORIDE, MAGNESIUM CHLORIDE AND DEXTROSE 2.5; 538; 448; 18.3; 5.08 G/100ML; MG/100ML; MG/100ML; MG/100ML; MG/100ML
4000 INJECTION, SOLUTION INTRAPERITONEAL NIGHTLY
Status: DISCONTINUED | OUTPATIENT
Start: 2022-04-18 | End: 2022-04-18

## 2022-04-18 RX ORDER — ONDANSETRON 2 MG/ML
4 INJECTION INTRAMUSCULAR; INTRAVENOUS EVERY 6 HOURS PRN
Status: DISCONTINUED | OUTPATIENT
Start: 2022-04-18 | End: 2022-04-21 | Stop reason: HOSPADM

## 2022-04-18 RX ORDER — CALCIUM ACETATE 667 MG/1
2 TABLET ORAL
Status: ON HOLD | COMMUNITY
End: 2022-05-10 | Stop reason: HOSPADM

## 2022-04-18 RX ORDER — NICOTINE POLACRILEX 4 MG
15 LOZENGE BUCCAL PRN
Status: DISCONTINUED | OUTPATIENT
Start: 2022-04-18 | End: 2022-04-21 | Stop reason: HOSPADM

## 2022-04-18 RX ORDER — SODIUM CHLORIDE, SODIUM LACTATE, CALCIUM CHLORIDE, MAGNESIUM CHLORIDE AND DEXTROSE 2.5; 538; 448; 18.3; 5.08 G/100ML; MG/100ML; MG/100ML; MG/100ML; MG/100ML
6000 INJECTION, SOLUTION INTRAPERITONEAL NIGHTLY
Status: DISCONTINUED | OUTPATIENT
Start: 2022-04-18 | End: 2022-04-18

## 2022-04-18 RX ORDER — NIFEDIPINE 60 MG/1
60 TABLET, EXTENDED RELEASE ORAL 2 TIMES DAILY
Status: DISCONTINUED | OUTPATIENT
Start: 2022-04-18 | End: 2022-04-19

## 2022-04-18 RX ORDER — VITAMIN B COMPLEX
2000 TABLET ORAL DAILY
Status: DISCONTINUED | OUTPATIENT
Start: 2022-04-19 | End: 2022-04-21 | Stop reason: HOSPADM

## 2022-04-18 RX ORDER — POTASSIUM CHLORIDE 20 MEQ/1
40 TABLET, EXTENDED RELEASE ORAL ONCE
Status: COMPLETED | OUTPATIENT
Start: 2022-04-18 | End: 2022-04-18

## 2022-04-18 RX ORDER — DEXTROSE MONOHYDRATE 50 MG/ML
100 INJECTION, SOLUTION INTRAVENOUS PRN
Status: DISCONTINUED | OUTPATIENT
Start: 2022-04-18 | End: 2022-04-21 | Stop reason: HOSPADM

## 2022-04-18 RX ORDER — SODIUM CHLORIDE 0.9 % (FLUSH) 0.9 %
5-40 SYRINGE (ML) INJECTION EVERY 12 HOURS SCHEDULED
Status: DISCONTINUED | OUTPATIENT
Start: 2022-04-18 | End: 2022-04-21 | Stop reason: HOSPADM

## 2022-04-18 RX ORDER — ONDANSETRON 4 MG/1
4 TABLET, ORALLY DISINTEGRATING ORAL EVERY 8 HOURS PRN
Status: DISCONTINUED | OUTPATIENT
Start: 2022-04-18 | End: 2022-04-21 | Stop reason: HOSPADM

## 2022-04-18 RX ORDER — 0.9 % SODIUM CHLORIDE 0.9 %
500 INTRAVENOUS SOLUTION INTRAVENOUS ONCE
Status: DISCONTINUED | OUTPATIENT
Start: 2022-04-18 | End: 2022-04-18

## 2022-04-18 RX ORDER — CALCIUM ACETATE 667 MG/1
1334 CAPSULE ORAL
Status: DISCONTINUED | OUTPATIENT
Start: 2022-04-18 | End: 2022-04-21 | Stop reason: HOSPADM

## 2022-04-18 RX ORDER — HEPARIN SODIUM 5000 [USP'U]/ML
5000 INJECTION, SOLUTION INTRAVENOUS; SUBCUTANEOUS EVERY 8 HOURS SCHEDULED
Status: DISCONTINUED | OUTPATIENT
Start: 2022-04-18 | End: 2022-04-21 | Stop reason: HOSPADM

## 2022-04-18 RX ORDER — POLYETHYLENE GLYCOL 3350 17 G/17G
17 POWDER, FOR SOLUTION ORAL DAILY PRN
Status: DISCONTINUED | OUTPATIENT
Start: 2022-04-18 | End: 2022-04-21 | Stop reason: HOSPADM

## 2022-04-18 RX ORDER — DEXTROSE MONOHYDRATE 25 G/50ML
12.5 INJECTION, SOLUTION INTRAVENOUS PRN
Status: DISCONTINUED | OUTPATIENT
Start: 2022-04-18 | End: 2022-04-21 | Stop reason: HOSPADM

## 2022-04-18 RX ORDER — SODIUM CHLORIDE, SODIUM LACTATE, CALCIUM CHLORIDE, MAGNESIUM CHLORIDE AND DEXTROSE 2.5; 538; 448; 18.3; 5.08 G/100ML; MG/100ML; MG/100ML; MG/100ML; MG/100ML
4000 INJECTION, SOLUTION INTRAPERITONEAL NIGHTLY
Status: DISCONTINUED | OUTPATIENT
Start: 2022-04-18 | End: 2022-04-19

## 2022-04-18 RX ORDER — FUROSEMIDE 40 MG/1
80 TABLET ORAL 2 TIMES DAILY
Status: DISCONTINUED | OUTPATIENT
Start: 2022-04-18 | End: 2022-04-21 | Stop reason: HOSPADM

## 2022-04-18 RX ORDER — SODIUM CHLORIDE, SODIUM LACTATE, CALCIUM CHLORIDE, MAGNESIUM CHLORIDE AND DEXTROSE 2.5; 538; 448; 18.3; 5.08 G/100ML; MG/100ML; MG/100ML; MG/100ML; MG/100ML
6000 INJECTION, SOLUTION INTRAPERITONEAL NIGHTLY
Status: DISCONTINUED | OUTPATIENT
Start: 2022-04-18 | End: 2022-04-19

## 2022-04-18 RX ORDER — SODIUM CHLORIDE 0.9 % (FLUSH) 0.9 %
5-40 SYRINGE (ML) INJECTION PRN
Status: DISCONTINUED | OUTPATIENT
Start: 2022-04-18 | End: 2022-04-21 | Stop reason: HOSPADM

## 2022-04-18 RX ORDER — HYDRALAZINE HYDROCHLORIDE 25 MG/1
25 TABLET, FILM COATED ORAL 2 TIMES DAILY
Status: DISCONTINUED | OUTPATIENT
Start: 2022-04-18 | End: 2022-04-21

## 2022-04-18 RX ORDER — ACETAMINOPHEN 650 MG/1
650 SUPPOSITORY RECTAL EVERY 6 HOURS PRN
Status: DISCONTINUED | OUTPATIENT
Start: 2022-04-18 | End: 2022-04-21 | Stop reason: HOSPADM

## 2022-04-18 RX ORDER — CHOLECALCIFEROL (VITAMIN D3) 10 MCG
1 TABLET ORAL DAILY
Status: DISCONTINUED | OUTPATIENT
Start: 2022-04-19 | End: 2022-04-21 | Stop reason: HOSPADM

## 2022-04-18 RX ADMIN — HEPARIN SODIUM 5000 UNITS: 5000 INJECTION INTRAVENOUS; SUBCUTANEOUS at 21:19

## 2022-04-18 RX ADMIN — CALCIUM ACETATE 1334 MG: 667 CAPSULE ORAL at 18:19

## 2022-04-18 RX ADMIN — NIFEDIPINE 60 MG: 60 TABLET, EXTENDED RELEASE ORAL at 21:19

## 2022-04-18 RX ADMIN — INSULIN LISPRO 1 UNITS: 100 INJECTION, SOLUTION INTRAVENOUS; SUBCUTANEOUS at 21:20

## 2022-04-18 RX ADMIN — SODIUM CHLORIDE, PRESERVATIVE FREE 10 ML: 5 INJECTION INTRAVENOUS at 21:20

## 2022-04-18 RX ADMIN — SODIUM CHLORIDE, SODIUM LACTATE, CALCIUM CHLORIDE, MAGNESIUM CHLORIDE AND DEXTROSE 6000 ML: 2.5; 538; 448; 18.3; 5.08 INJECTION, SOLUTION INTRAPERITONEAL at 22:51

## 2022-04-18 RX ADMIN — SODIUM CHLORIDE, SODIUM LACTATE, CALCIUM CHLORIDE, MAGNESIUM CHLORIDE AND DEXTROSE 4000 ML: 2.5; 538; 448; 18.3; 5.08 INJECTION, SOLUTION INTRAPERITONEAL at 22:49

## 2022-04-18 RX ADMIN — DOXEPIN HYDROCHLORIDE 10 MG: 10 CAPSULE ORAL at 21:19

## 2022-04-18 RX ADMIN — HYDRALAZINE HYDROCHLORIDE 25 MG: 25 TABLET, FILM COATED ORAL at 21:19

## 2022-04-18 RX ADMIN — POTASSIUM CHLORIDE 40 MEQ: 20 TABLET, EXTENDED RELEASE ORAL at 15:34

## 2022-04-18 RX ADMIN — FUROSEMIDE 80 MG: 40 TABLET ORAL at 18:19

## 2022-04-18 RX ADMIN — TAMSULOSIN HYDROCHLORIDE 0.4 MG: 0.4 CAPSULE ORAL at 21:19

## 2022-04-18 RX ADMIN — IOPAMIDOL 75 ML: 755 INJECTION, SOLUTION INTRAVENOUS at 14:08

## 2022-04-18 ASSESSMENT — ENCOUNTER SYMPTOMS
ABDOMINAL PAIN: 0
NAUSEA: 0
CHEST TIGHTNESS: 0
VOMITING: 0
SHORTNESS OF BREATH: 1
EYES NEGATIVE: 1

## 2022-04-18 ASSESSMENT — PAIN SCALES - GENERAL
PAINLEVEL_OUTOF10: 0
PAINLEVEL_OUTOF10: 0

## 2022-04-18 NOTE — CARE COORDINATION
INITIAL CASE MANAGEMENT ASSESSMENT    Reviewed chart, met with patient and his daughter-in-law Liz Lopez at bedside to assess possible discharge needs. Patient was very sleepy during conversation so a majority of the information was obtained by speaking with his daughter-in-law Rosana Harvey. Explained Case Management role/services. Living Situation: Patient lives with his wife, son, and two adult grandsons in a single family home. There are two steps to enter the home. Patient's daughter-in-law stated that patient has had difficulty getting up the steps of late and has needed family to assist him. ADLs: Independent. DME: Four prong cane and RW.     PT/OT Recs: TBD. Active Services: None. SW intern gave patient and his daughter-in-law Rosana Harvey a list of [place] [id number] agencies and SNFs. Transportation: Patient has not been driving of late. His daughter-in-law or son will transport him at discharge. Medications: [facility] on [address]. Patient's daughter-in-law is unsure if he has difficulty affording or obtaining medications at this time. PCP: Beatriz Campbell MD.       HD/PD: PD-runs throughout the night. PD supplies are through [employer]. PLAN/COMMENTS: Patient is currently on oxygen in ED, and is not normally on oxygen. Please watch for O2 needs at discharge. Patient's family wishes for him to return home. The Plan for Transition of Care is related to the following treatment goals: return home. The Patient and patient representative Rosana Harvey was provided with a choice of provider and agrees   with the discharge plan. [x] Yes [] No    Freedom of choice list was provided with basic dialogue that supports the patient's individualized plan of care/goals, treatment preferences and shares the quality data associated with the providers. [x] Yes [] No    SW/CM provided contact information for patient or family to call with any questions. SW/CM will follow and assist as needed.

## 2022-04-18 NOTE — ED PROVIDER NOTES
[address]  [address] [address]  Dept: [phone]  Loc: [phone]  eMERGENCYdEPARTMENT eNCOUnter      Pt Name: Norma Gonzáles  MRN: [id number]  Alyssia 1940  Date of evaluation: 4/18/2022  Provider:Tanya Dill PA-C    CHIEF COMPLAINT       Chief Complaint   Patient presents with    Other     sent in by kidney doctor for possible issues, has multiple issues, decreased urination, sob when he lays down. CRITICAL CARE TIME   Total Critical Care time was 32 minutes, excluding separately reportable procedures. There was a high probability of clinically significant/life threatening deterioration in the patient's condition which required my urgentintervention. HISTORY OF PRESENT ILLNESS  (Location/Symptom, Timing/Onset, Context/Setting, Quality, Duration,Modifying Factors, Severity.)   Norma Gonzáles is a 78 y.o. male with past medical history of ESRD, hypertension, type 2 DM, HDL, prostate cancer, anemia who presents to the emergency department by private vehicle for abnormal labs. Patient has a history of CKD and is on peritoneal dialysis nightly. Outpatient labs were drawn on 4/15 and showed worsening renal function. Family was contacted and told to bring patient to the ED for evaluation. Patient reports generalized fatigue, decreased urination, mild abdominal swelling and orthopnea. Denies any recent illness, chest pain, sob at rest, pain, fever or chills. Nursing Notes were reviewedand agreed with or any disagreements were addressed in the HPI. REVIEW OF SYSTEMS    (2-9 systems for level 4, 10 or more for level 5)     Review of Systems   Constitutional: Positive for fatigue. Negative for chills and fever. HENT: Negative. Eyes: Negative. Respiratory: Positive for shortness of breath (orthopnea). Negative for chest tightness. Cardiovascular: Negative for chest pain. Gastrointestinal: Negative for abdominal pain, nausea and vomiting. Genitourinary: Positive for decreased urine volume. Musculoskeletal: Negative for arthralgias and myalgias. Skin: Negative. Neurological: Negative. Psychiatric/Behavioral: Negative for behavioral problems and confusion. Except as noted above the remainder of the review of systems was reviewed and negative. PAST MEDICAL HISTORY         Diagnosis Date    Anemia in stage 4 chronic kidney disease ([facility] [address] 9/6/2019    Chronic kidney disease (CKD)     Chronic kidney [facility], stage IV (severe) ([email or web address] 9/6/2019    Diabetic nephropathy ([email or web address]     Hypertension        SURGICAL HISTORY           Procedure Laterality Date    COLONOSCOPY      JOINT REPLACEMENT         CURRENT MEDICATIONS     [unfilled]    ALLERGIES     Advil [ibuprofen], Naproxen, Pcn [penicillins], and Simvastatin    FAMILY HISTORY     History reviewed. No pertinent family history. Family Status   Relation Name Status    Sister  Alive    Sister  Alive        SOCIAL HISTORY      reports that he has quit smoking. He has quit using smokeless tobacco. He reports previous alcohol use. PHYSICAL EXAM    (up to 7 for level 4, 8 or more for level 5)     ED Triage Vitals [04/18/22 1105]   Enc Vitals Group      /65      Pulse 96      Resp 16      Temp 96.2 °F (35.7 °C)      Temp Source Tympanic      SpO2 91 %      Weight 170 lb (77.1 kg)      Height 5' 11\" (1.803 m)      Head Circumference       Peak Flow       Pain Score       Pain Loc       Pain Edu? Excl. in [address]? Physical Exam  Vitals reviewed. Constitutional:       Appearance: Normal appearance. HENT:      Head: Normocephalic and atraumatic. Cardiovascular:      Rate and Rhythm: Normal rate and regular rhythm. Pulmonary:      Effort: Pulmonary effort is normal. No respiratory distress. Breath sounds: No stridor.       Comments: Diminished on right  Abdominal:      Palpations: Abdomen is soft.      Tenderness: There is no abdominal tenderness. Musculoskeletal:         General: Normal range of motion. Cervical back: Normal range of motion and neck supple. Skin:     General: Skin is warm. Neurological:      General: No focal deficit present. Mental Status: He is alert and oriented to person, place, and time. Psychiatric:         Mood and Affect: Mood normal.         Behavior: Behavior normal.           DIAGNOSTIC RESULTS     EKG: All EKG's are interpreted by the Emergency Department Physician who either signs or Co-signs this chart in the absence of a cardiologist.    RADIOLOGY:   Non-plain film images such as CT, Ultrasound and MRI are read by the radiologist. Plain radiographic images are visualized and preliminarilyinterpreted by the emergency physician with the below findings:    Interpretation per the Radiologist below,if available at the time of this note:    CT CHEST PULMONARY EMBOLISM W CONTRAST   Final Result   1. No evidence of pulmonary embolic disease. 2. Redemonstration of dense right upper lobe consolidation anteriorly. Pathologic mediastinal adenopathy. The findings are concerning for   underlying malignancy. Consider pulmonary consultation with possible   sampling. 3. Complex right pleural effusion concerning for malignant effusion. Consider sampling. CT CHEST WO CONTRAST   Final Result   Heterogeneous consolidative change is seen in the right upper lobe and right   middle lobe, which could be due to underlying lung carcinoma given the marked   abnormal mediastinal adenopathy. Heterogeneous areas of nodularity along the   pleura posteriorly on the right raise the question of concomitant pleural   implants. RECOMMENDATIONS:   Unavailable         XR CHEST PORTABLE   Final Result   Increased moderate to large right pleural effusion with basilar volume loss.    Increased right perihilar opacification, likely infiltrate although   underlying mass is not excluded. Close imaging surveillance with   consideration of follow-up chest CT is recommended.                LABS:  Labs Reviewed   CBC WITH AUTO DIFFERENTIAL - Abnormal; Notable for the following components:       Result Value    RBC 2.68 (*)     Hemoglobin 9.6 (*)     Hematocrit 28.8 (*)     .6 (*)     MCH 35.8 (*)     RDW 17.7 (*)     Lymphocytes Absolute 0.4 (*)     Anisocytosis 1+ (*)     Macrocytes 1+ (*)     All other components within normal limits   COMPREHENSIVE METABOLIC PANEL W/ REFLEX TO MG FOR LOW K - Abnormal; Notable for the following components:    Sodium 131 (*)     Potassium reflex Magnesium 3.1 (*)     Chloride 91 (*)     CO2 16 (*)     Anion Gap 24 (*)     Glucose 160 (*)     BUN 76 (*)     CREATININE 11.5 (*)     GFR Non- 4 (*)     GFR  5 (*)     Calcium 7.6 (*)     Albumin 2.8 (*)     Albumin/Globulin Ratio 0.7 (*)     ALT 8 (*)     All other components within normal limits    Narrative:     CALL  Abhilash JACOBSON tel. [phone],  Chemistry results called to and read back by Dunia Silva RN, 04/18/2022  11:57, by April Anguiano   TROPONIN - Abnormal; Notable for the following components:    Troponin 0.11 (*)     All other components within normal limits   TROPONIN - Abnormal; Notable for the following components:    Troponin 0.11 (*)     All other components within normal limits   TROPONIN - Abnormal; Notable for the following components:    Troponin 0.11 (*)     All other components within normal limits   POCT GLUCOSE - Abnormal; Notable for the following components:    POC Glucose 141 (*)     All other components within normal limits   MAGNESIUM    Narrative:     Virgilio Neal tel. [phone],  Chemistry results called to and read back by Dunia Silva RN, 04/18/2022  11:57, by April Anguiano   TSH WITH REFLEX TO FT4    Narrative:     Collection has been rescheduled by TOUCH at 04/18/2022 18:18 Reason: Add    on   HEMOGLOBIN G3K   [facility] TO MG FOR LOW K   HEPATIC FUNCTION PANEL   CBC WITH AUTO DIFFERENTIAL   PHOSPHORUS   POCT GLUCOSE   POCT GLUCOSE   POCT GLUCOSE   POCT GLUCOSE       All other labs were within normal range or not returned as of this dictation. EMERGENCY DEPARTMENT COURSE and DIFFERENTIAL DIAGNOSIS/MDM:   Vitals:    Vitals:    04/18/22 1739 04/18/22 1753 04/18/22 1955 04/18/22 2257   BP:   (!) 145/65 136/61   Pulse:   93 92   Resp:  21 20 20   Temp:   97.6 °F (36.4 °C) 97.7 °F (36.5 °C)   TempSrc:   Oral Oral   SpO2:  96% 100% 99%   Weight: 164 lb 7.4 oz (74.6 kg)      Height:           MDM     Patient presents to the ED with the HPI noted above. Patient with worsening renal function. K 3.1. Consulation made to nephrology. Dr. Kyle Comer kindly spoke with me and will follow/arrange dialysis. Oral potassium replacement given for hypokalemia. CXR increased right pleural effusion. CT chest obtain for further evaluation. CT chest found lung mass and abnormal mediastinal adenopathy in addition to pleural effusion. Concern for malignancy discussed with patient/family. Patient with new O2 requirement of 4L in ED due to hypoxia. CT PE showed no evidence of PE. Troponin elevated at . 11. EKG showed no acute ischemic changes. Patient denies CP. No further emergent cardiac workup indicated. Consultation made to hospitalist regarding admission. Dr. Isabel Jolly kindly spoke with me and admitted patient. Patient seen independently in ED with my attending available for consultation as needed. CONSULTS:  IP CONSULT TO NEPHROLOGY  IP CONSULT TO CARDIOLOGY  IP CONSULT TO PULMONOLOGY  IP CONSULT TO NEPHROLOGY  IP CONSULT TO DIETITIAN    PROCEDURES:  Procedures    FINAL IMPRESSION      1. Acute respiratory failure with hypoxia ([address]    2. Abnormal CT of the chest    3. ESRD on dialysis ([address]    4. Hypokalemia          DISPOSITION/PLAN   [unfilled]    PATIENT REFERRED TO:  No follow-up provider specified.     DISCHARGE MEDICATIONS:  Current Discharge Medication List          (Please note that portions of this note were completed with a voice recognition program.  Efforts were made to edit the dictations but occasionally words are mis-transcribed.)    [address] [facility]          [address] [place], [place]  04/19/22 2036

## 2022-04-18 NOTE — ED NOTES
Pt resting in bed with family at the bedside. Denies any needs at this time.  Awaiting admission bed     Jaylan Cummings RN  04/18/22 9433

## 2022-04-18 NOTE — PROGRESS NOTES
Pt arrived to 5110. Alert and oriented x4. Family at bedside. Pt on 4L O2 nasal canula. Pt resting comfortably in bed. Call light w/in reach.

## 2022-04-18 NOTE — PROGRESS NOTES
Medication Reconciliation    List of medications patient is currently taking is complete. Source of information: 1. Conversation with patient at bedside                                       2. EPIC records      Allergies  Advil [ibuprofen], Naproxen, Pcn [penicillins], and Simvastatin     Notes regarding home medications:   1. Patient did not receive any of his home medications prior to arrival to the emergency department today.   2. Patient's current Lasix dose is 80 mg po BID    Mirna Valencia [facility], PharmD, BCPS  4/18/2022 11:59 AM

## 2022-04-18 NOTE — CONSULTS
[facility]  Phone: [phone]  Fax: [phone]  [email or web address]         Reason for Consult:  ESRD  Requesting Physician:  Dr. Vicky Caraballo    Chief Complaint:  Weakness SOB  History Obtained From:  patient, electronic medical record    History of Present Ilness:  77 y/o AAM well known to me with h/o ESRD on CCPD seen by his PCP Dr Flaquita Peguero today for increased SOB orthopnea weakness and decreased appetite  CR showed R pleural effusion and R hilar fullness  CT  Scan in ER with contrast showed Dense R Upper lobe conslidation mediastinal adenopathy concerning for malignancy Complex R pleural effusion  Concerning for malignant ascites  He reports decreased appetite, wt loss and Decreased UOP recently  O2 sat noted to be 83%placed on O2 Cr noted to be 12 mg      Past Medical History:        Diagnosis Date    Anemia in stage 4 chronic kidney disease ([facility] [address] 9/6/2019    Chronic kidney disease (CKD)     Chronic kidney disease, stage IV (severe) ([facility] [address] 9/6/2019    Diabetic nephropathy ([facility] [address]     Hypertension        Past Surgical History:    History reviewed. No pertinent surgical history. Home Medications:    No current facility-administered medications on file prior to encounter.      Current Outpatient Medications on File Prior to Encounter   Medication Sig Dispense Refill    calcium acetate 667 MG TABS Take 2 capsules by mouth 3 times daily (with meals)       b complex-C-folic acid (NEPHROCAPS) 1 MG capsule Take 1 capsule by mouth daily      vitamin D (CHOLECALCIFEROL) 25 MCG (1000 UT) TABS tablet Take 2,000 Units by mouth daily      doxepin (SINEQUAN) 10 MG capsule Take 10 mg by mouth nightly      aspirin 325 MG EC tablet Take 325 mg by mouth daily      furosemide (LASIX) 40 MG tablet Take 80 mg by mouth 2 times daily       NIFEdipine (PROCARDIA XL) 60 MG extended release tablet Take 60 mg by mouth 2 times daily      hydrALAZINE (APRESOLINE) 50 MG tablet Take 25 mg by mouth 2 times daily  tamsulosin (FLOMAX) 0.4 MG capsule Take 0.4 mg by mouth nightly      allopurinol (ZYLOPRIM) 100 MG tablet Take 100 mg by mouth daily      linagliptin (TRADJENTA) 5 MG tablet Take 5 mg by mouth daily         Allergies:  Advil [ibuprofen], Naproxen, Pcn [penicillins], and Simvastatin    Social History:    Social History     Socioeconomic History    Marital status:      Spouse name: Not on file    Number of children: Not on file    Years of education: Not on file    Highest education level: Not on file   Occupational History    Not on file   Tobacco Use    Smoking status: Former Smoker    Smokeless tobacco: Former User   Vaping Use    Vaping Use: Never used   Substance and Sexual Activity    Alcohol use: Yes     Comment: occasional    Drug use: Never    Sexual activity: Not on file   Other Topics Concern    Not on file   Social History Narrative    Not on file     Social Determinants of Health     Financial Resource Strain:     Difficulty of Paying Living Expenses: Not on file   Food Insecurity:     Worried About Running Out of Food in the Last Year: Not on file    Simón of Food in the Last Year: Not on file   Transportation Needs:     Lack of Transportation (Medical): Not on file    Lack of Transportation (Non-Medical):  Not on file   Physical Activity:     Days of Exercise per Week: Not on file    Minutes of Exercise per Session: Not on file   Stress:     Feeling of Stress : Not on file   Social Connections:     Frequency of Communication with Friends and Family: Not on file    Frequency of Social Gatherings with Friends and Family: Not on file    Attends Rastafarian Services: Not on file    Active Member of Clubs or Organizations: Not on file    Attends Club or Organization Meetings: Not on file    Marital Status: Not on file   Intimate Partner Violence:     Fear of Current or Ex-Partner: Not on file    Emotionally Abused: Not on file    Physically Abused: Not on file   [facility] Sexually Abused: Not on file   Housing Stability:     Unable to Pay for Housing in the Last Year: Not on file    Number of Places Lived in the Last Year: Not on file    Unstable Housing in the Last Year: Not on file       Family History:   History reviewed. No pertinent family history. Review of Systems:   Pertinent positives stated above in HPI. All other systems were reviewed and were negative.     Physical exam:   Constitutional:  VITALS:  BP (!) 148/68   Pulse 97   Temp 97.7 °F (36.5 °C) (Oral)   Resp 24   Ht 5' 11\" (1.803 m)   Wt 170 lb (77.1 kg)   SpO2 98%   BMI 23.71 kg/m²   Gen: alert, awake, nad ill appearing   Skin: no rash, turgor wnl  Heent:  eomi, mmm  Neck: no bruits or jvd noted, thyroid normal  Cardiovascular:  S1, S2 without m/r/g  Respiratory: crackles L base diminished BS R lung   Abdomen:  +bs, soft, nt, nd, no hepatosplenomegaly PD catheter L abdomen  Ext: no lower extremity edema  Psychiatric: mood and affect appropriate; judgement and insight intact  Musculoskeletal:  Rom, muscular strength intact; digits, nails normal    Data/  CBC:   Lab Results   Component Value Date    WBC 5.9 04/18/2022    RBC 2.68 04/18/2022    HGB 9.6 04/18/2022    HCT 28.8 04/18/2022    .6 04/18/2022    MCH 35.8 04/18/2022    MCHC 33.3 04/18/2022    RDW 17.7 04/18/2022     04/18/2022    MPV 7.7 04/18/2022     BMP:    Lab Results   Component Value Date     04/18/2022    K 3.1 04/18/2022    CL 91 04/18/2022    CO2 16 04/18/2022    BUN 76 04/18/2022    LABALBU 2.8 04/18/2022    CREATININE 11.5 04/18/2022    CALCIUM 7.6 04/18/2022    GFRAA 5 04/18/2022    LABGLOM 4 04/18/2022    GLUCOSE 160 04/18/2022         Assessment/  1-ESRD on CCPD Cr noted to be elevated above baseline level  2-R lung consolidation and pleural effusion concerning for malignancy  3-Hypokalemia  4 Hyponatremia  5 Anemia ESRD    Plan/  1-Continue CCPD use 2.5 % Dianeal  2-Consider pulmonary consult   3-Replace K+   4 Place on FR  5 Hold ARANZA at this time with suspicion for malignancy    Thank you for the consultation. Please do not hesitate to call with questions.     Michelle Montaño MD, [address]

## 2022-04-18 NOTE — ED NOTES
Pt reports that he is on home peritoneal dialysis and has not been feeling well for the last few week. Complains of nausea and SOB. Pt's O2 saturation was 83 % on room air and he was placed on 4 L of O2 NC.       Tegan Ace RN  04/18/22 9025

## 2022-04-18 NOTE — H&P
Hospital Medicine History & Physical      PCP: Kirk Ashley MD    Date of Admission: 4/18/2022    Date of Service: Pt seen/examined on 4/18/22 and Admitted to Inpatient with expected LOS greater than two midnights due to medical therapy. Chief Complaint:  SOB    History Of Present Illness:      78 y.o. male with ESRD on CAPD, presents with progressive shortness of breath over the past 3 months. Past outpatient chest xrays have demonstrated a R peffusion. He endorses CAPD compliance with CAPD. He recently had outpatient labs/cxr showing progression of renal disease and pleural effusion prompting ER consultation today. Patient denies chest pain, fever, chills, palpitations, abd pain, n/v/d. He denies home O2 dependence, and presents hypoxemic requiring 4 lpm NC O2. 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 No pertinent surgical history. Medications Prior to Admission:      Prior to Admission medications    Medication Sig Start Date End Date Taking?  Authorizing Provider   calcium acetate 667 MG TABS Take 2 capsules by mouth 3 times daily (with meals)    Yes Historical Provider, MD   b complex-C-folic acid (NEPHROCAPS) 1 MG capsule Take 1 capsule by mouth daily   Yes Historical Provider, MD   vitamin D (CHOLECALCIFEROL) 25 MCG (1000 UT) TABS tablet Take 2,000 Units by mouth daily   Yes Historical Provider, MD   doxepin (SINEQUAN) 10 MG capsule Take 10 mg by mouth nightly    Historical Provider, MD   aspirin 325 MG EC tablet Take 325 mg by mouth daily    Historical Provider, MD   furosemide (LASIX) 40 MG tablet Take 80 mg by mouth 2 times daily     Historical Provider, MD   NIFEdipine (PROCARDIA XL) 60 MG extended release tablet Take 60 mg by mouth 2 times daily    Historical Provider, MD   hydrALAZINE (APRESOLINE) 50 MG tablet Take 25 mg by mouth 2 times daily     Historical Provider, MD   tamsulosin (FLOMAX) 0.4 MG capsule Take 0.4 mg by mouth nightly    Historical Provider, MD   allopurinol (ZYLOPRIM) 100 MG tablet Take 100 mg by mouth daily    Historical Provider, MD   linagliptin (TRADJENTA) 5 MG tablet Take 5 mg by mouth daily    Historical Provider, MD       Allergies:  Advil [ibuprofen], Naproxen, Pcn [penicillins], and Simvastatin    Social History:           TOBACCO:   reports that he has quit smoking. He has quit using smokeless tobacco.  ETOH:   reports current alcohol use. Family History:           History reviewed. No pertinent family history. REVIEW OF SYSTEMS:   Pertinent positives as noted in the HPI. All other systems reviewed and negative. PHYSICAL EXAM PERFORMED:    BP (!) 148/68   Pulse 97   Temp 97.7 °F (36.5 °C) (Oral)   Resp 21   Ht 5' 11\" (1.803 m)   Wt 164 lb 7.4 oz (74.6 kg)   SpO2 96%   BMI 22.94 kg/m²     General appearance:  No apparent distress, appears stated age and cooperative. HEENT:  Normal cephalic, atraumatic  Pupils equal, round, and reactive to light. Extra ocular muscles intact. Conjunctivae/corneas clear. Neck: Supple, with full range of motion. No jugular venous distention. Trachea midline. Respiratory:  Normal respiratory effort.  No use of accessory muscles, no intercostal retractions  Cardiovascular:  Regular rate and rhythm, no ectopy  Abdomen: Soft, non-tender, non-distended    Musculoskeletal:  No clubbing, cyanosi   Neurologic:   grossly non-focal.  Psychiatric:  Alert and oriented, thought content appropriate, normal insight         Labs:     Recent Labs     04/18/22  1118   WBC 5.9   HGB 9.6*   HCT 28.8*        Recent Labs     04/18/22  1118   *   K 3.1*   CL 91*   CO2 16*   BUN 76*   CREATININE 11.5*   CALCIUM 7.6*     Recent Labs     04/18/22  1118   AST 21   ALT 8* BILITOT <0.2   ALKPHOS 106     No results for input(s): INR in the last 72 hours. Recent Labs     04/18/22  1118   TROPONINI 0.11*       Urinalysis:    No results found for: Светлана Bacca, BACTERIA, RBCUA, BLOODU, SPECGRAV, GLUCOSEU    Radiology:          CT CHEST PULMONARY EMBOLISM W CONTRAST   Preliminary Result   1. No evidence of pulmonary embolic disease. 2. Redemonstration of dense right upper lobe consolidation anteriorly. Pathologic mediastinal adenopathy. The findings are concerning for   underlying malignancy. Consider pulmonary consultation with possible   sampling. 3. Complex right pleural effusion concerning for malignant effusion. Consider sampling. CT CHEST WO CONTRAST   Final Result   Heterogeneous consolidative change is seen in the right upper lobe and right   middle lobe, which could be due to underlying lung carcinoma given the marked   abnormal mediastinal adenopathy. Heterogeneous areas of nodularity along the   pleura posteriorly on the right raise the question of concomitant pleural   implants. RECOMMENDATIONS:   Unavailable         XR CHEST PORTABLE   Final Result   Increased moderate to large right pleural effusion with basilar volume loss. Increased right perihilar opacification, likely infiltrate although   underlying mass is not excluded. Close imaging surveillance with   consideration of follow-up chest CT is recommended.              ASSESSMENT:    Active Hospital Problems    Diagnosis Date Noted    Acute hypoxemic respiratory failure ([facility] [address] [J96.01] 04/18/2022         PLAN:    1) Lung Mass / pl effusion  - Pulmo consult, incidental finding on ct chest today    2) Hypoxemia  - neg CTPA for PE    3) New onset aflutter  - Card consult  - echocardiogram  - tsh, trend trop    4) Elev trop  - denies chest pain, ekg without acute ischemic/injury changes  - type II, renal ds    5) ESRD  - Nephrology consultation  - continues to make urine, continuing lasix    DVT Prophylaxis: hep   Diet: ADULT DIET; Regular; 4 carb choices (60 gm/meal); Low Fat/Low Chol/High Fiber/ROME; Low Sodium (2 gm); 1500 ml  Code Status: Full Aram Vicente MD    Thank you Estefany King MD for the opportunity to be involved in this patient's care. If you have any questions or concerns please feel free to contact me at [phone].

## 2022-04-19 ENCOUNTER — APPOINTMENT (OUTPATIENT)
Dept: CT IMAGING | Age: 82
DRG: 180 | End: 2022-04-19
Payer: MEDICARE

## 2022-04-19 ENCOUNTER — APPOINTMENT (OUTPATIENT)
Dept: ULTRASOUND IMAGING | Age: 82
DRG: 180 | End: 2022-04-19
Payer: MEDICARE

## 2022-04-19 ENCOUNTER — APPOINTMENT (OUTPATIENT)
Dept: GENERAL RADIOLOGY | Age: 82
DRG: 180 | End: 2022-04-19
Payer: MEDICARE

## 2022-04-19 LAB
ALBUMIN SERPL-MCNC: 2.9 G/DL (ref 3.4–5)
ALP BLD-CCNC: 106 U/L (ref 40–129)
ALT SERPL-CCNC: 9 U/L (ref 10–40)
ANION GAP SERPL CALCULATED.3IONS-SCNC: 27 MMOL/L (ref 3–16)
APPEARANCE FLUID: NORMAL
AST SERPL-CCNC: 23 U/L (ref 15–37)
BASOPHILS ABSOLUTE: 0 K/UL (ref 0–0.2)
BASOPHILS RELATIVE PERCENT: 0.4 %
BILIRUB SERPL-MCNC: <0.2 MG/DL (ref 0–1)
BILIRUBIN DIRECT: <0.2 MG/DL (ref 0–0.3)
BILIRUBIN, INDIRECT: ABNORMAL MG/DL (ref 0–1)
BUN BLDV-MCNC: 80 MG/DL (ref 7–20)
CALCIUM SERPL-MCNC: 7.5 MG/DL (ref 8.3–10.6)
CELL COUNT FLUID TYPE: NORMAL
CHLORIDE BLD-SCNC: 94 MMOL/L (ref 99–110)
CLOT EVALUATION: NORMAL
CO2: 16 MMOL/L (ref 21–32)
COLOR FLUID: NORMAL
CREAT SERPL-MCNC: 11.6 MG/DL (ref 0.8–1.3)
EOSINOPHILS ABSOLUTE: 0 K/UL (ref 0–0.6)
EOSINOPHILS RELATIVE PERCENT: 0.4 %
ESTIMATED AVERAGE GLUCOSE: 105.4 MG/DL
FLUID TYPE: NORMAL
GFR AFRICAN AMERICAN: 5
GFR NON-AFRICAN AMERICAN: 4
GLUCOSE BLD-MCNC: 133 MG/DL (ref 70–99)
GLUCOSE BLD-MCNC: 139 MG/DL (ref 70–99)
GLUCOSE BLD-MCNC: 181 MG/DL (ref 70–99)
GLUCOSE BLD-MCNC: 196 MG/DL (ref 70–99)
GLUCOSE BLD-MCNC: 200 MG/DL (ref 70–99)
HBA1C MFR BLD: 5.3 %
HCT VFR BLD CALC: 28.3 % (ref 40.5–52.5)
HCT VFR BLD CALC: 29.1 % (ref 40.5–52.5)
HEMOGLOBIN: 9.2 G/DL (ref 13.5–17.5)
HEMOGLOBIN: 9.5 G/DL (ref 13.5–17.5)
INR BLD: 0.97 (ref 0.88–1.12)
LD, FLUID: >2500 U/L
LV EF: 53 %
LVEF MODALITY: NORMAL
LYMPHOCYTES ABSOLUTE: 0.3 K/UL (ref 1–5.1)
LYMPHOCYTES RELATIVE PERCENT: 4.4 %
LYMPHOCYTES, BODY FLUID: 34 %
MAGNESIUM: 2.5 MG/DL (ref 1.8–2.4)
MCH RBC QN AUTO: 35.4 PG (ref 26–34)
MCHC RBC AUTO-ENTMCNC: 32.4 G/DL (ref 31–36)
MCV RBC AUTO: 109.5 FL (ref 80–100)
MONOCYTE, FLUID: 4 %
MONOCYTES ABSOLUTE: 0.7 K/UL (ref 0–1.3)
MONOCYTES RELATIVE PERCENT: 11.5 %
NEUTROPHIL, FLUID: 62 %
NEUTROPHILS ABSOLUTE: 5 K/UL (ref 1.7–7.7)
NEUTROPHILS RELATIVE PERCENT: 83.3 %
NUCLEATED CELLS FLUID: 1670 /CUMM
NUMBER OF CELLS COUNTED FLUID: 100
PDW BLD-RTO: 18.1 % (ref 12.4–15.4)
PERFORMED ON: ABNORMAL
PHOSPHORUS: 8.7 MG/DL (ref 2.5–4.9)
PLATELET # BLD: 305 K/UL (ref 135–450)
PMV BLD AUTO: 7.2 FL (ref 5–10.5)
POTASSIUM REFLEX MAGNESIUM: 3.3 MMOL/L (ref 3.5–5.1)
PROTEIN FLUID: 5.4 G/DL
PROTHROMBIN TIME: 11 SEC (ref 9.9–12.7)
RBC # BLD: 2.59 M/UL (ref 4.2–5.9)
RBC FLUID: NORMAL /CUMM
SODIUM BLD-SCNC: 137 MMOL/L (ref 136–145)
TOTAL PROTEIN: 6.6 G/DL (ref 6.4–8.2)
WBC # BLD: 6 K/UL (ref 4–11)

## 2022-04-19 PROCEDURE — 6370000000 HC RX 637 (ALT 250 FOR IP): Performed by: INTERNAL MEDICINE

## 2022-04-19 PROCEDURE — 2500000003 HC RX 250 WO HCPCS: Performed by: HOSPITALIST

## 2022-04-19 PROCEDURE — 94761 N-INVAS EAR/PLS OXIMETRY MLT: CPT

## 2022-04-19 PROCEDURE — 0W993ZZ DRAINAGE OF RIGHT PLEURAL CAVITY, PERCUTANEOUS APPROACH: ICD-10-PCS | Performed by: RADIOLOGY

## 2022-04-19 PROCEDURE — 93306 TTE W/DOPPLER COMPLETE: CPT

## 2022-04-19 PROCEDURE — 83615 LACTATE (LD) (LDH) ENZYME: CPT

## 2022-04-19 PROCEDURE — 2060000000 HC ICU INTERMEDIATE R&B

## 2022-04-19 PROCEDURE — 84157 ASSAY OF PROTEIN OTHER: CPT

## 2022-04-19 PROCEDURE — 99223 1ST HOSP IP/OBS HIGH 75: CPT | Performed by: INTERNAL MEDICINE

## 2022-04-19 PROCEDURE — 89051 BODY FLUID CELL COUNT: CPT

## 2022-04-19 PROCEDURE — 32551 INSERTION OF CHEST TUBE: CPT

## 2022-04-19 PROCEDURE — 87015 SPECIMEN INFECT AGNT CONCNTJ: CPT

## 2022-04-19 PROCEDURE — 84100 ASSAY OF PHOSPHORUS: CPT

## 2022-04-19 PROCEDURE — 87070 CULTURE OTHR SPECIMN AEROBIC: CPT

## 2022-04-19 PROCEDURE — 85014 HEMATOCRIT: CPT

## 2022-04-19 PROCEDURE — 85025 COMPLETE CBC W/AUTO DIFF WBC: CPT

## 2022-04-19 PROCEDURE — 88341 IMHCHEM/IMCYTCHM EA ADD ANTB: CPT

## 2022-04-19 PROCEDURE — 0W9930Z DRAINAGE OF RIGHT PLEURAL CAVITY WITH DRAINAGE DEVICE, PERCUTANEOUS APPROACH: ICD-10-PCS | Performed by: RADIOLOGY

## 2022-04-19 PROCEDURE — 80076 HEPATIC FUNCTION PANEL: CPT

## 2022-04-19 PROCEDURE — 2700000000 HC OXYGEN THERAPY PER DAY

## 2022-04-19 PROCEDURE — 80048 BASIC METABOLIC PNL TOTAL CA: CPT

## 2022-04-19 PROCEDURE — 85610 PROTHROMBIN TIME: CPT

## 2022-04-19 PROCEDURE — 36415 COLL VENOUS BLD VENIPUNCTURE: CPT

## 2022-04-19 PROCEDURE — 99222 1ST HOSP IP/OBS MODERATE 55: CPT | Performed by: NURSE PRACTITIONER

## 2022-04-19 PROCEDURE — 87205 SMEAR GRAM STAIN: CPT

## 2022-04-19 PROCEDURE — 88342 IMHCHEM/IMCYTCHM 1ST ANTB: CPT

## 2022-04-19 PROCEDURE — 2580000003 HC RX 258: Performed by: HOSPITALIST

## 2022-04-19 PROCEDURE — 77012 CT SCAN FOR NEEDLE BIOPSY: CPT

## 2022-04-19 PROCEDURE — C1729 CATH, DRAINAGE: HCPCS

## 2022-04-19 PROCEDURE — 83735 ASSAY OF MAGNESIUM: CPT

## 2022-04-19 PROCEDURE — 2580000003 HC RX 258: Performed by: INTERNAL MEDICINE

## 2022-04-19 PROCEDURE — 6370000000 HC RX 637 (ALT 250 FOR IP): Performed by: HOSPITALIST

## 2022-04-19 PROCEDURE — A4722 DIALYS SOL FLD VOL > 1999CC: HCPCS | Performed by: INTERNAL MEDICINE

## 2022-04-19 PROCEDURE — 88305 TISSUE EXAM BY PATHOLOGIST: CPT

## 2022-04-19 PROCEDURE — 88112 CYTOPATH CELL ENHANCE TECH: CPT

## 2022-04-19 PROCEDURE — 6370000000 HC RX 637 (ALT 250 FOR IP): Performed by: NURSE PRACTITIONER

## 2022-04-19 PROCEDURE — 90945 DIALYSIS ONE EVALUATION: CPT

## 2022-04-19 PROCEDURE — 85018 HEMOGLOBIN: CPT

## 2022-04-19 PROCEDURE — 5A1D70Z PERFORMANCE OF URINARY FILTRATION, INTERMITTENT, LESS THAN 6 HOURS PER DAY: ICD-10-PCS | Performed by: INTERNAL MEDICINE

## 2022-04-19 PROCEDURE — 6360000002 HC RX W HCPCS: Performed by: HOSPITALIST

## 2022-04-19 RX ORDER — POTASSIUM CHLORIDE 20 MEQ/1
40 TABLET, EXTENDED RELEASE ORAL ONCE
Status: DISCONTINUED | OUTPATIENT
Start: 2022-04-19 | End: 2022-04-19

## 2022-04-19 RX ORDER — SODIUM CHLORIDE, SODIUM LACTATE, CALCIUM CHLORIDE, MAGNESIUM CHLORIDE AND DEXTROSE 1.5; 538; 448; 18.3; 5.08 G/100ML; MG/100ML; MG/100ML; MG/100ML; MG/100ML
6000 INJECTION, SOLUTION INTRAPERITONEAL NIGHTLY
Status: DISCONTINUED | OUTPATIENT
Start: 2022-04-19 | End: 2022-04-21 | Stop reason: HOSPADM

## 2022-04-19 RX ORDER — SODIUM CHLORIDE, SODIUM LACTATE, CALCIUM CHLORIDE, MAGNESIUM CHLORIDE AND DEXTROSE 1.5; 538; 448; 18.3; 5.08 G/100ML; MG/100ML; MG/100ML; MG/100ML; MG/100ML
4000 INJECTION, SOLUTION INTRAPERITONEAL NIGHTLY
Status: DISCONTINUED | OUTPATIENT
Start: 2022-04-19 | End: 2022-04-21 | Stop reason: HOSPADM

## 2022-04-19 RX ORDER — SODIUM CHLORIDE, SODIUM LACTATE, CALCIUM CHLORIDE, MAGNESIUM CHLORIDE AND DEXTROSE 2.5; 538; 448; 18.3; 5.08 G/100ML; MG/100ML; MG/100ML; MG/100ML; MG/100ML
6000 INJECTION, SOLUTION INTRAPERITONEAL NIGHTLY
Status: DISCONTINUED | OUTPATIENT
Start: 2022-04-19 | End: 2022-04-19

## 2022-04-19 RX ORDER — SODIUM CHLORIDE, SODIUM LACTATE, CALCIUM CHLORIDE, MAGNESIUM CHLORIDE AND DEXTROSE 2.5; 538; 448; 18.3; 5.08 G/100ML; MG/100ML; MG/100ML; MG/100ML; MG/100ML
4000 INJECTION, SOLUTION INTRAPERITONEAL NIGHTLY
Status: DISCONTINUED | OUTPATIENT
Start: 2022-04-19 | End: 2022-04-19

## 2022-04-19 RX ORDER — POTASSIUM CHLORIDE 750 MG/1
20 TABLET, FILM COATED, EXTENDED RELEASE ORAL ONCE
Status: COMPLETED | OUTPATIENT
Start: 2022-04-19 | End: 2022-04-19

## 2022-04-19 RX ORDER — NIFEDIPINE 30 MG/1
30 TABLET, EXTENDED RELEASE ORAL DAILY
Status: DISCONTINUED | OUTPATIENT
Start: 2022-04-19 | End: 2022-04-20

## 2022-04-19 RX ADMIN — CALCIUM ACETATE 1334 MG: 667 CAPSULE ORAL at 12:11

## 2022-04-19 RX ADMIN — CALCIUM ACETATE 1334 MG: 667 CAPSULE ORAL at 08:26

## 2022-04-19 RX ADMIN — INSULIN LISPRO 2 UNITS: 100 INJECTION, SOLUTION INTRAVENOUS; SUBCUTANEOUS at 17:36

## 2022-04-19 RX ADMIN — NEPHROCAP 1 MG: 1 CAP ORAL at 08:27

## 2022-04-19 RX ADMIN — HEPARIN SODIUM 5000 UNITS: 5000 INJECTION INTRAVENOUS; SUBCUTANEOUS at 16:12

## 2022-04-19 RX ADMIN — SODIUM CHLORIDE, PRESERVATIVE FREE 10 ML: 5 INJECTION INTRAVENOUS at 21:01

## 2022-04-19 RX ADMIN — SODIUM CHLORIDE, PRESERVATIVE FREE 10 ML: 5 INJECTION INTRAVENOUS at 08:26

## 2022-04-19 RX ADMIN — POTASSIUM CHLORIDE 20 MEQ: 750 TABLET, EXTENDED RELEASE ORAL at 12:11

## 2022-04-19 RX ADMIN — FUROSEMIDE 80 MG: 40 TABLET ORAL at 17:36

## 2022-04-19 RX ADMIN — SODIUM CHLORIDE, SODIUM LACTATE, CALCIUM CHLORIDE, MAGNESIUM CHLORIDE AND DEXTROSE 4000 ML: 1.5; 538; 448; 18.3; 5.08 INJECTION, SOLUTION INTRAPERITONEAL at 20:18

## 2022-04-19 RX ADMIN — CALCIUM ACETATE 1334 MG: 667 CAPSULE ORAL at 17:36

## 2022-04-19 RX ADMIN — FUROSEMIDE 80 MG: 40 TABLET ORAL at 08:26

## 2022-04-19 RX ADMIN — INSULIN LISPRO 1 UNITS: 100 INJECTION, SOLUTION INTRAVENOUS; SUBCUTANEOUS at 08:26

## 2022-04-19 RX ADMIN — HYDRALAZINE HYDROCHLORIDE 25 MG: 25 TABLET, FILM COATED ORAL at 08:27

## 2022-04-19 RX ADMIN — NIFEDIPINE 30 MG: 30 TABLET, FILM COATED, EXTENDED RELEASE ORAL at 17:36

## 2022-04-19 RX ADMIN — HEPARIN SODIUM 5000 UNITS: 5000 INJECTION INTRAVENOUS; SUBCUTANEOUS at 21:00

## 2022-04-19 RX ADMIN — ASPIRIN 325 MG: 325 TABLET, COATED ORAL at 08:27

## 2022-04-19 RX ADMIN — ALLOPURINOL 100 MG: 100 TABLET ORAL at 08:27

## 2022-04-19 RX ADMIN — HEPARIN SODIUM 5000 UNITS: 5000 INJECTION INTRAVENOUS; SUBCUTANEOUS at 05:41

## 2022-04-19 RX ADMIN — TAMSULOSIN HYDROCHLORIDE 0.4 MG: 0.4 CAPSULE ORAL at 21:00

## 2022-04-19 RX ADMIN — SODIUM CHLORIDE, SODIUM LACTATE, CALCIUM CHLORIDE, MAGNESIUM CHLORIDE AND DEXTROSE 6000 ML: 1.5; 538; 448; 18.3; 5.08 INJECTION, SOLUTION INTRAPERITONEAL at 20:18

## 2022-04-19 RX ADMIN — Medication 2000 UNITS: at 08:27

## 2022-04-19 RX ADMIN — DOXEPIN HYDROCHLORIDE 10 MG: 10 CAPSULE ORAL at 21:00

## 2022-04-19 RX ADMIN — NIFEDIPINE 60 MG: 60 TABLET, EXTENDED RELEASE ORAL at 08:27

## 2022-04-19 ASSESSMENT — PAIN SCALES - GENERAL
PAINLEVEL_OUTOF10: 0

## 2022-04-19 NOTE — PLAN OF CARE
Problem: Falls - Risk of:  Goal: Will remain free from falls  Description: Will remain free from falls  4/19/2022 0948 by Sarah Cervantes RN  Outcome: Ongoing  4/18/2022 2334 by Princess Peraza RN  Outcome: Ongoing  Goal: Absence of physical injury  Description: Absence of physical injury  4/19/2022 0948 by Sarah Cervantes RN  Outcome: Ongoing  4/18/2022 2334 by Princess Peraza RN  Outcome: Ongoing     Problem: Skin Integrity:  Goal: Will show no infection signs and symptoms  Description: Will show no infection signs and symptoms  4/19/2022 0948 by Sarah Cervantes RN  Outcome: Ongoing  4/18/2022 2334 by Princess Peraza RN  Outcome: Ongoing  Goal: Absence of new skin breakdown  Description: Absence of new skin breakdown  4/19/2022 0948 by Sarah Cervantes RN  Outcome: Ongoing  4/18/2022 2334 by Princess Peraza RN  Outcome: Ongoing

## 2022-04-19 NOTE — BRIEF OP NOTE
Brief Postoperative Note    Arva Scheuermann  YOB: 1940  [id number]    Pre-operative Diagnosis: right pleural effusion    Post-operative Diagnosis: Same    Procedure: US-guided right thoracentesis    Anesthesia: Local    Surgeons: Sabina Tavarez MD    Estimated Blood Loss: Less than 5 mL    Complications: None    Specimens: Was Obtained: pleural fluid drained and sent for labs    Findings: Successful US-guided right thoracentesis. Patient will need chest tube as only 800cc was able to be removed from thoracentesis and there is still a lot of pleural fluid remaining.     Electronically signed by Sabina Tavarez MD on 4/19/2022 at 10:46 AM

## 2022-04-19 NOTE — PROGRESS NOTES
Hospitalist Progress Note      PCP: Raliegh Boast, MD    Date of Admission: 4/18/2022    Chief Complaint: SOB    Hospital Course: 82m admitted with progressive parapneumonic effusions, worsening sob and creatinine, presenting with hypoxemia and new onset atrial flutter    Subjective: Patient denies chest pain, feels more sob though tolerating decreased NC O2 from 4 lpm to 2 lpm      Medications:  Reviewed    Infusion Medications    dextrose      sodium chloride       Scheduled Medications    potassium chloride  20 mEq Oral Once    allopurinol  100 mg Oral Daily    aspirin  325 mg Oral Daily    b complex-C-folic acid  1 capsule Oral Daily    calcium acetate  1,334 mg Oral TID WC    doxepin  10 mg Oral Nightly    furosemide  80 mg Oral BID    hydrALAZINE  25 mg Oral BID    NIFEdipine  60 mg Oral BID    tamsulosin  0.4 mg Oral Nightly    Vitamin D  2,000 Units Oral Daily    insulin lispro  0-6 Units SubCUTAneous TID WC    insulin lispro  0-3 Units SubCUTAneous Nightly    sodium chloride flush  5-40 mL IntraVENous 2 times per day    heparin (porcine)  5,000 Units SubCUTAneous 3 times per day    dianeal lo-chris 2.5%  6,000 mL IntraPERitoneal Nightly    And    dianeal lo-chris 2.5%  4,000 mL IntraPERitoneal Nightly     PRN Meds: glucose, dextrose, glucagon (rDNA), dextrose, sodium chloride flush, sodium chloride, ondansetron **OR** ondansetron, polyethylene glycol, acetaminophen **OR** acetaminophen, perflutren lipid microspheres      Intake/Output Summary (Last 24 hours) at 4/19/2022 0931  Last data filed at 4/19/2022 0600  Gross per 24 hour   Intake 560 ml   Output 4597 ml   Net -4037 ml       Physical Exam Performed:    /60   Pulse 100   Temp 98.2 °F (36.8 °C)   Resp 21   Ht 5' 11\" (1.803 m)   Wt 162 lb 14.7 oz (73.9 kg)   SpO2 94%   BMI 22.72 kg/m²     General appearance: No apparent distress, appears stated age and cooperative. HEENT: Pupils equal, round, and reactive to light. Conjunctivae/corneas clear. Neck: Supple, with full range of motion. No jugular venous distention. Trachea midline. Respiratory:  Normal respiratory effort. Equal excursion, no use of accessory muscles  Cardiovascular: normal rate, irregularly irregular rhythm  Abdomen: Soft, non-tender, non-distended    Musculoskeletal: No clubbing, cyanosis or edema bilaterally. Skin: Skin color, texture, turgor normal.  No rashes or lesions. Neurologic:   grossly non-focal.  Psychiatric: Alert and oriented, thought content appropriate, normal insight         Labs:   Recent Labs     04/18/22  1118 04/19/22 0449   WBC 5.9 6.0   HGB 9.6* 9.2*   HCT 28.8* 28.3*    305     Recent Labs     04/18/22  1118 04/19/22 0449   * 137   K 3.1* 3.3*   CL 91* 94*   CO2 16* 16*   BUN 76* 80*   CREATININE 11.5* 11.6*   CALCIUM 7.6* 7.5*   PHOS  --  8.7*     Recent Labs     04/18/22  1118 04/19/22 0449   AST 21 23   ALT 8* 9*   BILIDIR  --  <0.2   BILITOT <0.2 <0.2   ALKPHOS 106 106     No results for input(s): INR in the last 72 hours. Recent Labs     04/18/22  1118 04/18/22  1725 04/18/22  1955   TROPONINI 0.11* 0.11* 0.11*       Urinalysis:    No results found for: Ceola Beery, BACTERIA, RBCUA, BLOODU, SPECGRAV, GLUCOSEU    Radiology:  CT CHEST PULMONARY EMBOLISM W CONTRAST   Final Result   1. No evidence of pulmonary embolic disease. 2. Redemonstration of dense right upper lobe consolidation anteriorly. Pathologic mediastinal adenopathy. The findings are concerning for   underlying malignancy. Consider pulmonary consultation with possible   sampling. 3. Complex right pleural effusion concerning for malignant effusion. Consider sampling. CT CHEST WO CONTRAST   Final Result   Heterogeneous consolidative change is seen in the right upper lobe and right   middle lobe, which could be due to underlying lung carcinoma given the marked   abnormal mediastinal adenopathy.   Heterogeneous areas of nodularity along the   pleura posteriorly on the right raise the question of concomitant pleural   implants. RECOMMENDATIONS:   Unavailable         XR CHEST PORTABLE   Final Result   Increased moderate to large right pleural effusion with basilar volume loss. Increased right perihilar opacification, likely infiltrate although   underlying mass is not excluded. Close imaging surveillance with   consideration of follow-up chest CT is recommended. CT GUIDED CHEST TUBE    (Results Pending)           Assessment/Plan:    Active Hospital Problems    Diagnosis Date Noted    Acute hypoxemic respiratory failure (HCC) [J96.01] 04/18/2022     Acute hypoxemic resp failure  - pl effusion/ mass  - chf?    ESRD  - RRT as per Nephrology    Atrial fibrillation  - trop flat, tsh normal, echo pending    Anemia  - stable    Lung Mass / Effusion  - s/p thoracentesis, fluid sent for cytology    DVT Prophylaxis: hepsq  Diet: ADULT DIET; Regular; 4 carb choices (60 gm/meal);  Low Fat/Low Chol/High Fiber/ROME; Low Sodium (2 gm); 1500 ml  Code Status: Full Tammy Domínguez MD

## 2022-04-19 NOTE — CONSULTS
0-6 Units SubCUTAneous TID WC    insulin lispro  0-3 Units SubCUTAneous Nightly    sodium chloride flush  5-40 mL IntraVENous 2 times per day    heparin (porcine)  5,000 Units SubCUTAneous 3 times per day    dianeal lo-chris 2.5%  6,000 mL IntraPERitoneal Nightly    And    dianeal lo-chris 2.5%  4,000 mL IntraPERitoneal Nightly       Continuous Infusions:   dextrose      sodium chloride         PRN Meds:  glucose, dextrose, glucagon (rDNA), dextrose, sodium chloride flush, sodium chloride, ondansetron **OR** ondansetron, polyethylene glycol, acetaminophen **OR** acetaminophen, perflutren lipid microspheres    ALLERGIES:  Patient is allergic to advil [ibuprofen], naproxen, pcn [penicillins], and simvastatin. REVIEW OF SYSTEMS:  Constitutional: Negative for fever  HENT: Negative for sore throat  Eyes: Negative for redness   Respiratory: + dyspnea, cough  Cardiovascular: Negative for chest pain  Gastrointestinal: Negative for vomiting, diarrhea   Genitourinary: Negative for hematuria   Musculoskeletal: Negative for arthralgias   Skin: Negative for rash  Neurological: Negative for syncope  Hematological: Negative for adenopathy  Psychiatric/Behavorial: Negative for anxiety    Objective:   PHYSICAL EXAM:  Blood pressure 106/69, pulse 117, temperature 97.5 °F (36.4 °C), temperature source Oral, resp. rate 28, height 5' 11\" (1.803 m), weight 164 lb 7.4 oz (74.6 kg), SpO2 90 %.'  Gen:  No acute distress. Eyes: PERRL. Anicteric sclera. No conjunctival injection. ENT: No discharge. Posterior oropharynx clear. External appearance of ears and nose normal.  Neck: Trachea midline. No mass   Resp:  No crackles. No wheezes. No rhonchi. Diminished breath sounds on the right  CV: Regular rate. Regular rhythm. No murmur or rub. No edema. GI: Soft, Non-tender. Non-distended. +BS  Skin: Warm, dry, w/o erythema. Lymph: No cervical or supraclavicular LAD. M/S: No cyanosis. No clubbing.     Neuro:   no focal neurologic deficit. Moves all extremities  Psych: Awake and alert, Oriented x 3. Judgement and insight appropriate. Mood stable. Data Reviewed:   LABS:  CBC:   Recent Labs     04/18/22  1118 04/19/22  0449   WBC 5.9 6.0   HGB 9.6* 9.2*   HCT 28.8* 28.3*   .6* 109.5*    305     BMP:   Recent Labs     04/18/22  1118 04/19/22  0449   * 137   K 3.1* 3.3*   CL 91* 94*   CO2 16* 16*   PHOS  --  8.7*   BUN 76* 80*   CREATININE 11.5* 11.6*     LIVER PROFILE:   Recent Labs     04/18/22  1118 04/19/22  0449   AST 21 23   ALT 8* 9*   BILIDIR  --  <0.2   BILITOT <0.2 <0.2   ALKPHOS 106 106     PT/INR: No results for input(s): PROTIME, INR in the last 72 hours. APTT: No results for input(s): APTT in the last 72 hours. UA:No results for input(s): NITRITE, COLORU, PHUR, LABCAST, WBCUA, RBCUA, MUCUS, TRICHOMONAS, YEAST, BACTERIA, CLARITYU, SPECGRAV, LEUKOCYTESUR, UROBILINOGEN, BILIRUBINUR, BLOODU, GLUCOSEU, AMORPHOUS in the last 72 hours. Invalid input(s): KETONESU  No results for input(s): PHART, WVH5BFY, PO2ART in the last 72 hours. Vent Information  SpO2: 90 %    Radiology Review:  Pertinent images / reports were reviewed as a part of this visit.     Chest CT    Interpreted by: Me    View: CT of the chest    Findings: Pleural effusion on the left, masslike consolidation in the right upper lobe    EKG  Diagnosis  atrial flutter (see lead V1)  with occasional Premature ventricular complexesabnormal R wave progression Abnormal ECGWhen compared with ECG of 15-APR-2022 12:05,QT has lengthened  but still within normal limitsConfirmed by Andie, [address] [place] [address] ([id number]) on 4/18/2022 1:45:00 PM         Assessment/Plan:     Right lung mass  Pleural effusion, right  -New diagnosis; highly suspicious for malignant process  -We will ask IR to place CT-guided chest tube for full evacuation  -We will send pleural fluids analysis including cytology cultures  Former smoker    A flutter/fibrillation  -EP consulted    CKD, stage IV  -Creatinine 11  -Nephrology following     This note was transcribed using [id number] [employer]. Please disregard any translational errors.     Thank you for the consult    [address] Pulmonary, Sleep and Critical Care Medicine

## 2022-04-19 NOTE — BRIEF OP NOTE
Brief Postoperative Note    Pallavi Burden  YOB: 1940  [id number]    Pre-operative Diagnosis: right pleural effusion    Post-operative Diagnosis: Same    Procedure: CT-guided right chest tube placement    Anesthesia: Moderate Sedation    Surgeons: Mauro Ohara MD    Estimated Blood Loss: Less than 5 mL    Complications: None    Specimens: Was Not Obtained    Findings: Successful placement of a right 10F chest tube.     Electronically signed by Mauro Ohara MD on 4/19/2022 at 11:45 AM

## 2022-04-19 NOTE — PLAN OF CARE
Problem: Nutrition  Goal: Optimal nutrition therapy  Outcome: Ongoing   Nutrition Problem #1: Inadequate oral intake  Intervention: Food and/or Nutrient Delivery: Continue Current Diet,Start Oral Nutrition Supplement  Nutritional Goals: Consume greater than 50% of meals and supplements this admission

## 2022-04-19 NOTE — PROGRESS NOTES
[facility]  Phone: [phone]  Fax: [phone]  [email or web address]       We are following the patient for esrd  77 y/o AAM well known to me with h/o ESRD on CCPD seen by his PCP Dr Shannon Fry today for increased SOB orthopnea weakness and decreased appetite  CR showed R pleural effusion and R hilar fullness  CT  Scan in ER with contrast showed Dense R Upper lobe conslidation mediastinal adenopathy concerning for malignancy Complex R pleural effusion  Concerning for malignant ascites  He reports decreased appetite, wt loss and Decreased UOP recently  O2 sat noted to be 83%placed on O2 Cr noted to be 12 mg  SUBJECTIVE:  Underwent CT guided R CT placement this AM  Notes pain in back and neck  No issues with cycler overnight    Family present    OBJECTIVE:     PHYSICAL:    TEMPERATURE:  Current - Temp: 97.8 °F (36.6 °C);  Max - Temp  Av.8 °F (36.6 °C)  Min: 97.5 °F (36.4 °C)  Max: 98.2 °F (36.8 °C)  RESPIRATIONS RANGE: Resp  Av.8  Min: 13  Max: 28  PULSE RANGE: Pulse  Av.2  Min: 89  Max: 117  BLOOD PRESSURE RANGE:  Systolic (47YVZ), SSL:795 , Min:90 , TDR:001   ; Diastolic (59FJQ), MZT:52, Min:47, Max:69    PULSE OXIMETRY RANGE: SpO2  Av.4 %  Min: 90 %  Max: 100 %  24HR INTAKE/OUTPUT:    Intake/Output Summary (Last 24 hours) at 2022 1237  Last data filed at 2022 1044  Gross per 24 hour   Intake 1040 ml   Output 5547 ml   Net -4507 ml       CONSTITUTIONAL:  Ill appearing NAD  HEENT:  Lids and lashes normal, pupils equal, round and reactive to light, extra ocular muscles intact, sclera clear, conjunctiva normal  NECK:  Supple, symmetrical, trachea midline, no adenopathy, thyroid symmetric, not enlarged and no tenderness, skin normal  LUNGS:  R CT in place hemorrhagic fluid noted   CARDIOVASCULAR:  Normal apical impulse, regular rate and rhythm, normal S1 and S2  ABDOMEN:  No scars, normal bowel sounds, soft, non-distended PD catheter in place  NEUROLOGIC:  alert, oriented, normal speech, no focal findings or movement disorder noted  SKIN: no bruising or bleeding  EXTREMITIES: no edema    Medications     dextrose      sodium chloride          Data      CBC:   Recent Labs     04/18/22  1118 04/19/22  0449   WBC 5.9 6.0   RBC 2.68* 2.59*   HGB 9.6* 9.2*   HCT 28.8* 28.3*    305     BMP:    Recent Labs     04/18/22  1118 04/19/22  0449   * 137   K 3.1* 3.3*   CL 91* 94*   CO2 16* 16*   BUN 76* 80*   CREATININE 11.5* 11.6*   CALCIUM 7.6* 7.5*   GLUCOSE 160* 196*     Phosphorus:    Recent Labs     04/19/22  0449   PHOS 8.7*     Magnesium:    Recent Labs     04/18/22  1118 04/19/22  0449   MG 2.40 2.50*         ASSESSMENT     Patient Active Problem List   Diagnosis    Anemia in stage 4 chronic kidney disease (HCC)    [facility] IV [facility]severe) [facility]    Acute hypoxemic respiratory failure ([facility])       PLAN    1-ESRD on CCPD Cr noted to be elevated above baseline level On Cycler Time increased to 9 hours with extra 2500 ml exchange Change to 1.5% dianeal tonight   2-R lung consolidation and pleural effusion concerning for malignancy Pulmonary on board S/P R CT placement   3-Hypokalemia replace  4 Hyponatremia improved  5 Anemia ESRD holding ARANZA at this time   6 Atrial flutter Cardiology following   7 HTN BP running low Meds adjusted Use 1.5 % dIANEAL      Rosamaria Qiu MD, [address]

## 2022-04-19 NOTE — CONSULTS
for Fluid Requirements:  1 ml/kcal      Nutrition Related Findings:  non-pitting BLE edema; BM 4/19; BUN 80, Creat 11.6, Phos 8.7      Wounds:  None       Current Nutrition Therapies:    ADULT DIET; Regular; 4 carb choices (60 gm/meal); Low Fat/Low Chol/High Fiber/ROME; Low Sodium (2 gm); 1500 ml    Anthropometric Measures:  · Height: 5' 11\" (180.3 cm)  · Current Body Weight: 162 lb (73.5 kg)   · Admission Body Weight: 170 lb (77.1 kg)    · Ideal Body Weight: 172 lbs; % Ideal Body Weight 94.2 %   · BMI: 22.6  · Adjusted Body Weight:  No Adjustment   · BMI Categories: Normal Weight (BMI 22.0 to 24.9) age over 72       Nutrition Diagnosis:   · Inadequate oral intake related to inadequate protein-energy intake as evidenced by poor intake prior to admission,intake 0-25%      Nutrition Interventions:   Food and/or Nutrient Delivery:  Continue Current Diet,Start Oral Nutrition Supplement  Nutrition Education/Counseling:  No recommendation at this time   Coordination of Nutrition Care:  Continue to monitor while inpatient    Goals:  Consume greater than 50% of meals and supplements this admission       Nutrition Monitoring and Evaluation:   Behavioral-Environmental Outcomes:  None Identified   Food/Nutrient Intake Outcomes:  Food and Nutrient Intake,Supplement Intake  Physical Signs/Symptoms Outcomes:  Biochemical Data,Nutrition Focused Physical Findings,Skin,Weight,Chewing or Swallowing,Fluid Status or Edema     Discharge Planning:     Too soon to determine     Electronically signed by Vikas Che on 4/19/22 at 10:57 AM EDT    Contact: [phone]

## 2022-04-19 NOTE — CONSULTS
Cardiac Electrophysiology Consultation     Date: 4/19/2022  Admit Date:  4/18/2022  Admission Diagnosis: Hypokalemia [E87.6]  Abnormal CT of the chest [R93.89]  ESRD on dialysis ([facility] [N18.6, Z99.2]  Acute respiratory failure with hypoxia ([facility] [J96.01]  Acute hypoxemic respiratory failure ([facility] [J96.01]     Reason for Consultation: new atrial flutter  Consult Requesting Physician: Rosa Lu MD       History of Present Illness  Allyssa Hurd is an 78y.o. year old male with past medical history significant for ESRD on PD, HTN and DM who presented to the ED complaining of SOB. Noted to be in new atrial flutter with v-rates in the 90s. He notes increasing SOB over the last month but particularly worse over the last few days to the point where short walks of a few feet would make him very SOB. Also admits to fatigue, dizziness and lightheadedness. No syncope or falls. No chest pain or palpitations. Remains in atrial flutter, v-rates 90s-100s. Also noted to have possible lung CA seen on CT scan with a complex right pleural effusion, plans for chest tube today noted.        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 ([facility] 9/6/2019    Chronic kidney disease (CKD)     Chronic kidney disease, stage IV (severe) ([facility] 9/6/2019    Diabetic nephropathy ([facility]     Hypertension         Past Surgical History:   Procedure Laterality Date    COLONOSCOPY      JOINT REPLACEMENT         Current Outpatient Medications   Medication Instructions    allopurinol (ZYLOPRIM) 100 mg, Oral, DAILY    aspirin 325 mg, Oral, DAILY    b complex-C-folic acid (NEPHROCAPS) 1 MG capsule 1 capsule, Oral, DAILY    calcium acetate 667 MG TABS 2 capsules, Oral, 3 TIMES DAILY WITH MEALS    doxepin (SINEQUAN) 10 mg, Oral, NIGHTLY    furosemide (LASIX) 80 mg, Oral, 2 TIMES DAILY    hydrALAZINE (APRESOLINE) 25 mg, Oral, 2 TIMES DAILY    linagliptin (TRADJENTA) 5 mg, Oral, DAILY    NIFEdipine (PROCARDIA XL) 60 mg, Oral, 2 TIMES DAILY    tamsulosin (FLOMAX) 0.4 mg, Oral, NIGHTLY    vitamin D (CHOLECALCIFEROL) 2,000 Units, Oral, DAILY        Allergies   Allergen Reactions    Advil [Ibuprofen]     Naproxen     Pcn [Penicillins]     Simvastatin        Social History:   reports that he has quit smoking. He has quit using smokeless tobacco. He reports previous alcohol use. Family History:  family history is not on file. Review of Systems:  · General: negative for fever, chills   · Ophthalmic ROS: negative for eye pain or loss of vision  · ENT ROS: negative for headaches, sore throat, nasal drainage  · Respiratory: positive for SOB, negative for cough, sputum  · Cardiovascular: positive for orthopnea, negative for chest pain, palpitations.   · Gastrointestinal: negative for abdominal pain, diarrhea, N/V  · Hematology: negative for bleeding, blood clots, bruising or jaundice  · Genito-Urinary:  negative for dysuria or incontinence  · Musculoskeletal: negative for joint swelling, muscle pain  · Neurological: positive for dizziness, negative for confusion, headaches   · Psychiatric: negative anxiety, depression  · Dermatological: negative for rash    Medications:  Scheduled Meds:   allopurinol  100 mg Oral Daily    aspirin  325 mg Oral Daily    b complex-C-folic acid  1 capsule Oral Daily    calcium acetate  1,334 mg Oral TID WC    doxepin  10 mg Oral Nightly    furosemide  80 mg Oral BID    hydrALAZINE  25 mg Oral BID    NIFEdipine  60 mg Oral BID    tamsulosin  0.4 mg Oral Nightly    Vitamin D  2,000 Units Oral Daily    insulin lispro  0-6 Units SubCUTAneous TID WC    insulin lispro  0-3 Units SubCUTAneous Nightly    sodium chloride flush  5-40 mL IntraVENous 2 times per day    heparin (porcine)  5,000 Units SubCUTAneous 3 times per day    dianeal lo-chris 2.5%  6,000 mL IntraPERitoneal Nightly    And    dianeal lo-chris 2.5%  4,000 mL IntraPERitoneal Nightly      Continuous Infusions:   dextrose      sodium chloride       PRN Meds:.glucose, dextrose, glucagon (rDNA), dextrose, sodium chloride flush, sodium chloride, ondansetron **OR** ondansetron, polyethylene glycol, acetaminophen **OR** acetaminophen, perflutren lipid microspheres     Physical Examination:  Vitals:    22 0334   BP: 106/69   Pulse: 117   Resp: 28   Temp: 97.5 °F (36.4 °C)   SpO2: 90%        Intake/Output Summary (Last 24 hours) at 2022 0842  Last data filed at 2022 0600  Gross per 24 hour   Intake 560 ml   Output 4597 ml   Net -4037 ml     In: 560 [P.O.:560]  Out: 4597    Wt Readings from Last 3 Encounters:   22 164 lb 7.4 oz (74.6 kg)   21 192 lb (87.1 kg)   21 192 lb (87.1 kg)     Temp  Av.3 °F (36.3 °C)  Min: 96.2 °F (35.7 °C)  Max: 97.7 °F (36.5 °C)  Pulse  Av.6  Min: 84  Max: 117  BP  Min: 106/69  Max: 148/68  SpO2  Av %  Min: 90 %  Max: 100 %    · Telemetry: Atrial flutter v-rates 100s. · Constitutional: Alert, in no acute distress. Appears stated age. · Head: Normocephalic and atraumatic. · Eyes: Conjunctivae normal. EOM are normal.   · Neck: Neck supple. No lymphadenopathy. No rigidity. No JVD present. · Cardiovascular: Controlled rate, IRR. No murmurs, rubs or gallops. No S3 or S4.  · Pulmonary/Chest: Clear breath sounds on left side and RU/RML, diminished in right base. No crackles, wheezes or rhonchi. No respiratory accessory muscle use. · Abdominal: Soft. Normal bowel sounds present. No distension, No tenderness. · Musculoskeletal: No tenderness. No edema    · Lymphadenopathy: Has no cervical adenopathy. · Neurological: Alert and oriented. No gross deficits. · Skin: Skin is warm and dry. No rash, lesions, ulcerations noted. · Psychiatric: No anxiety nor agitation. Labs:  Reviewed.    Recent Labs     22  1118 22  0449   * 137   K 3.1* 3.3*   CL 91* 94*   CO2 16* 16*   PHOS  --  8.7*   BUN 76* 80*   CREATININE 11.5* 11.6* Recent Labs     22  1118 22  0449   WBC 5.9 6.0   HGB 9.6* 9.2*   HCT 28.8* 28.3*   .6* 109.5*    305     Lab Results   Component Value Date    TROPONINI 0.11 2022     No results found for: BNP  No results found for: PROTIME, INR  No results found for: CHOL, HDL, TRIG    Diagnostic and imaging results reviewed. EC/15/22  Atrial flutter at 93 BPM.     Echo: 6/15/12  - Left ventricle: The cavity size was normal. Wall thickness     was normal. Systolic function was normal. The estimated     ejection fraction was in the range of 60% to 65%. Wall     motion was normal; there were no regional wall motion     abnormalities. Left ventricular diastolic function     parameters were normal.     Assessment & Plan:    New atrial flutter   - first seen on EKG on 4/15/22   - v-rates fairly well controlled without any AV janak blocking meds on board   - admits to SOB but unclear if this is from his arrhythmia or pleural effusion or both   - CHADS2-VASc 4 (age, HTN, DM) - plans for chest tube today noted, would recommend anticoagulation with DOAC preferably when no other procedures planned/bleeding risk acceptable   - discussed treatment options with pt including medical therapy, cardioversion and ablation. Treatment plan will depend on if he has a malignancy and the plan for that - for now will pursue rate control only   - echo pending    Right lung mass   - noted on CT scan with R pleural effusion, concerning for malignancy   - plans for chest tube today   - pulmonary following    ESRD   - on PD   - nephrology following    Discussed with Dr. Leonard Johnson.     YAZ Crandall  [facility] [address], [address] [place] [address]  Phone: [phone]  Fax: [phone]    Electronically signed by YAZ Raines - CNP on 2022 at 8:42 AM

## 2022-04-20 ENCOUNTER — APPOINTMENT (OUTPATIENT)
Dept: GENERAL RADIOLOGY | Age: 82
DRG: 180 | End: 2022-04-20
Payer: MEDICARE

## 2022-04-20 LAB
ALBUMIN SERPL-MCNC: 2.8 G/DL (ref 3.4–5)
ANION GAP SERPL CALCULATED.3IONS-SCNC: 22 MMOL/L (ref 3–16)
BUN BLDV-MCNC: 75 MG/DL (ref 7–20)
CALCIUM SERPL-MCNC: 7.6 MG/DL (ref 8.3–10.6)
CHLORIDE BLD-SCNC: 94 MMOL/L (ref 99–110)
CO2: 19 MMOL/L (ref 21–32)
CREAT SERPL-MCNC: 10.8 MG/DL (ref 0.8–1.3)
GFR AFRICAN AMERICAN: 6
GFR NON-AFRICAN AMERICAN: 5
GLUCOSE BLD-MCNC: 131 MG/DL (ref 70–99)
GLUCOSE BLD-MCNC: 164 MG/DL (ref 70–99)
GLUCOSE BLD-MCNC: 190 MG/DL (ref 70–99)
GLUCOSE BLD-MCNC: 244 MG/DL (ref 70–99)
GLUCOSE BLD-MCNC: 83 MG/DL (ref 70–99)
HCT VFR BLD CALC: 28 % (ref 40.5–52.5)
HEMOGLOBIN: 9.2 G/DL (ref 13.5–17.5)
MCH RBC QN AUTO: 35.2 PG (ref 26–34)
MCHC RBC AUTO-ENTMCNC: 32.8 G/DL (ref 31–36)
MCV RBC AUTO: 107.5 FL (ref 80–100)
PDW BLD-RTO: 18.4 % (ref 12.4–15.4)
PERFORMED ON: ABNORMAL
PERFORMED ON: NORMAL
PHOSPHORUS: 7.9 MG/DL (ref 2.5–4.9)
PLATELET # BLD: 288 K/UL (ref 135–450)
PMV BLD AUTO: 7.6 FL (ref 5–10.5)
POTASSIUM SERPL-SCNC: 3.1 MMOL/L (ref 3.5–5.1)
RBC # BLD: 2.61 M/UL (ref 4.2–5.9)
SODIUM BLD-SCNC: 135 MMOL/L (ref 136–145)
WBC # BLD: 5.5 K/UL (ref 4–11)

## 2022-04-20 PROCEDURE — 94760 N-INVAS EAR/PLS OXIMETRY 1: CPT

## 2022-04-20 PROCEDURE — 99232 SBSQ HOSP IP/OBS MODERATE 35: CPT | Performed by: INTERNAL MEDICINE

## 2022-04-20 PROCEDURE — A4722 DIALYS SOL FLD VOL > 1999CC: HCPCS | Performed by: INTERNAL MEDICINE

## 2022-04-20 PROCEDURE — 71045 X-RAY EXAM CHEST 1 VIEW: CPT

## 2022-04-20 PROCEDURE — 6360000002 HC RX W HCPCS: Performed by: HOSPITALIST

## 2022-04-20 PROCEDURE — 2580000003 HC RX 258: Performed by: HOSPITALIST

## 2022-04-20 PROCEDURE — 2060000000 HC ICU INTERMEDIATE R&B

## 2022-04-20 PROCEDURE — 80069 RENAL FUNCTION PANEL: CPT

## 2022-04-20 PROCEDURE — 99233 SBSQ HOSP IP/OBS HIGH 50: CPT | Performed by: NURSE PRACTITIONER

## 2022-04-20 PROCEDURE — 6370000000 HC RX 637 (ALT 250 FOR IP): Performed by: NURSE PRACTITIONER

## 2022-04-20 PROCEDURE — 2500000003 HC RX 250 WO HCPCS: Performed by: HOSPITALIST

## 2022-04-20 PROCEDURE — 6370000000 HC RX 637 (ALT 250 FOR IP): Performed by: INTERNAL MEDICINE

## 2022-04-20 PROCEDURE — 85027 COMPLETE CBC AUTOMATED: CPT

## 2022-04-20 PROCEDURE — 6370000000 HC RX 637 (ALT 250 FOR IP): Performed by: HOSPITALIST

## 2022-04-20 PROCEDURE — 2580000003 HC RX 258: Performed by: INTERNAL MEDICINE

## 2022-04-20 PROCEDURE — 90945 DIALYSIS ONE EVALUATION: CPT

## 2022-04-20 PROCEDURE — 36415 COLL VENOUS BLD VENIPUNCTURE: CPT

## 2022-04-20 RX ORDER — POTASSIUM CHLORIDE 750 MG/1
40 TABLET, FILM COATED, EXTENDED RELEASE ORAL
Status: DISCONTINUED | OUTPATIENT
Start: 2022-04-20 | End: 2022-04-21 | Stop reason: HOSPADM

## 2022-04-20 RX ADMIN — SODIUM CHLORIDE, PRESERVATIVE FREE 10 ML: 5 INJECTION INTRAVENOUS at 09:13

## 2022-04-20 RX ADMIN — SODIUM CHLORIDE, SODIUM LACTATE, CALCIUM CHLORIDE, MAGNESIUM CHLORIDE AND DEXTROSE 6000 ML: 1.5; 538; 448; 18.3; 5.08 INJECTION, SOLUTION INTRAPERITONEAL at 21:35

## 2022-04-20 RX ADMIN — Medication 2000 UNITS: at 09:15

## 2022-04-20 RX ADMIN — HYDRALAZINE HYDROCHLORIDE 25 MG: 25 TABLET, FILM COATED ORAL at 21:35

## 2022-04-20 RX ADMIN — INSULIN LISPRO 2 UNITS: 100 INJECTION, SOLUTION INTRAVENOUS; SUBCUTANEOUS at 12:05

## 2022-04-20 RX ADMIN — CALCIUM ACETATE 1334 MG: 667 CAPSULE ORAL at 17:12

## 2022-04-20 RX ADMIN — HEPARIN SODIUM 5000 UNITS: 5000 INJECTION INTRAVENOUS; SUBCUTANEOUS at 14:20

## 2022-04-20 RX ADMIN — FUROSEMIDE 80 MG: 40 TABLET ORAL at 09:15

## 2022-04-20 RX ADMIN — HEPARIN SODIUM 5000 UNITS: 5000 INJECTION INTRAVENOUS; SUBCUTANEOUS at 05:35

## 2022-04-20 RX ADMIN — HEPARIN SODIUM 5000 UNITS: 5000 INJECTION INTRAVENOUS; SUBCUTANEOUS at 21:35

## 2022-04-20 RX ADMIN — INSULIN LISPRO 1 UNITS: 100 INJECTION, SOLUTION INTRAVENOUS; SUBCUTANEOUS at 21:34

## 2022-04-20 RX ADMIN — HYDRALAZINE HYDROCHLORIDE 25 MG: 25 TABLET, FILM COATED ORAL at 09:15

## 2022-04-20 RX ADMIN — SODIUM CHLORIDE, PRESERVATIVE FREE 10 ML: 5 INJECTION INTRAVENOUS at 21:36

## 2022-04-20 RX ADMIN — NIFEDIPINE 30 MG: 30 TABLET, FILM COATED, EXTENDED RELEASE ORAL at 09:15

## 2022-04-20 RX ADMIN — METOPROLOL TARTRATE 25 MG: 25 TABLET, FILM COATED ORAL at 15:00

## 2022-04-20 RX ADMIN — DOXEPIN HYDROCHLORIDE 10 MG: 10 CAPSULE ORAL at 22:33

## 2022-04-20 RX ADMIN — CALCIUM ACETATE 1334 MG: 667 CAPSULE ORAL at 09:15

## 2022-04-20 RX ADMIN — METOPROLOL TARTRATE 25 MG: 25 TABLET, FILM COATED ORAL at 21:35

## 2022-04-20 RX ADMIN — CALCIUM ACETATE 1334 MG: 667 CAPSULE ORAL at 12:04

## 2022-04-20 RX ADMIN — ALLOPURINOL 100 MG: 100 TABLET ORAL at 09:15

## 2022-04-20 RX ADMIN — FUROSEMIDE 80 MG: 40 TABLET ORAL at 17:12

## 2022-04-20 RX ADMIN — TAMSULOSIN HYDROCHLORIDE 0.4 MG: 0.4 CAPSULE ORAL at 21:35

## 2022-04-20 RX ADMIN — POTASSIUM CHLORIDE 40 MEQ: 750 TABLET, EXTENDED RELEASE ORAL at 09:14

## 2022-04-20 RX ADMIN — SODIUM CHLORIDE, SODIUM LACTATE, CALCIUM CHLORIDE, MAGNESIUM CHLORIDE AND DEXTROSE 4000 ML: 1.5; 538; 448; 18.3; 5.08 INJECTION, SOLUTION INTRAPERITONEAL at 21:35

## 2022-04-20 RX ADMIN — ASPIRIN 325 MG: 325 TABLET, COATED ORAL at 09:15

## 2022-04-20 RX ADMIN — NEPHROCAP 1 MG: 1 CAP ORAL at 09:15

## 2022-04-20 ASSESSMENT — PAIN DESCRIPTION - PROGRESSION
CLINICAL_PROGRESSION: GRADUALLY IMPROVING

## 2022-04-20 ASSESSMENT — PAIN SCALES - GENERAL
PAINLEVEL_OUTOF10: 0

## 2022-04-20 NOTE — PROGRESS NOTES
Pulmonary/Critical Care Progress Note    CXR after clamped chest tube reviewed. Dr. Dina Viera notified. Chest tube removed without complication. Scant serosanguinous drainage coming from insertion site. Dressed with gauze and Tegaderm. Instructed family to change dressing if saturated and extra supplies given. Instructed patient and family to return to ED if acute onset chest pain or shortness of breath arises. Family and patient verbalized understanding. RN notified. OK for discharge from pulmonary perspective. Dr. Dina Viera to follow up with patient regarding cytology results and pulmonary follow-up appointment.     MARY CARMEN [facility]

## 2022-04-20 NOTE — PLAN OF CARE
Problem: Falls - Risk of:  Goal: Will remain free from falls  Description: Will remain free from falls  4/19/2022 2304 by Thomas Bustillos RN  Outcome: Ongoing     Problem: Falls - Risk of:  Goal: Absence of physical injury  Description: Absence of physical injury  4/19/2022 2304 by Thomas Bustillos RN  Outcome: Ongoing     Problem: Skin Integrity:  Goal: Will show no infection signs and symptoms  Description: Will show no infection signs and symptoms  4/19/2022 2304 by Thomas Bustillos RN  Outcome: Ongoing     Problem: Skin Integrity:  Goal: Absence of new skin breakdown  Description: Absence of new skin breakdown  4/19/2022 2304 by Thomas Bustillos RN  Outcome: Ongoing     Problem: Nutrition  Goal: Optimal nutrition therapy  4/19/2022 2304 by Thomas Bustillos RN  Outcome: Ongoing

## 2022-04-20 NOTE — PROGRESS NOTES
Chest out per pulmonology. If dressing becomes saturated, ok to change with gauze and Tegaderm dressing. Make pulmonology aware if pt were to become increasingly SOB.

## 2022-04-20 NOTE — PROGRESS NOTES
Cardiac Electrophysiology Progress Note     Admit Date: 2022     Reason for follow up: new atrial flutter    HPI and Interval History:   Pallavi Burden is an 78y.o. year old male with past medical history significant for ESRD on PD, HTN and DM who presented to the ED complaining of SOB. Noted to be in new atrial flutter with v-rates in the 90s. He notes increasing SOB over the last month but particularly worse over the last few days to the point where short walks of a few feet would make him very SOB. Also admits to fatigue, dizziness and lightheadedness. No syncope or falls. No chest pain or palpitations. Remains in atrial flutter, v-rates 90s-100s. Also noted to have possible lung CA seen on CT scan with a complex right pleural effusion, plans for chest tube today noted. He is feeling much better today, dyspnea is greatly improved. Denies any CP or palpitations. Remains in atrial flutter, rate with fair control with AV janak blocking meds. Chest tube with dark red drainage. Physical Examination:  Vitals:    22 0338   BP: 96/68   Pulse: 114   Resp: 19   Temp: 98.1 °F (36.7 °C)   SpO2: 98%        Intake/Output Summary (Last 24 hours) at 2022 0858  Last data filed at 2022 0600  Gross per 24 hour   Intake 950 ml   Output 4173 ml   Net -3223 ml     In: 950 [P.O.:950]  Out: 4173    Wt Readings from Last 3 Encounters:   22 162 lb 14.7 oz (73.9 kg)   21 192 lb (87.1 kg)   21 192 lb (87.1 kg)     Temp  Av.8 °F (36.6 °C)  Min: 97.4 °F (36.3 °C)  Max: 98.2 °F (36.8 °C)  Pulse  Av.9  Min: 89  Max: 114  BP  Min: 90/67  Max: 109/47  SpO2  Av.8 %  Min: 92 %  Max: 100 %    · Telemetry: Atrial flutter v-rates 100s. · Constitutional: Alert, in no acute distress. Appears stated age. · Head: Normocephalic and atraumatic. · Eyes: Conjunctivae normal. EOM are normal.   · Neck: Neck supple. No lymphadenopathy. No rigidity. No JVD present.    · Cardiovascular: Controlled rate, IRR. No murmurs, rubs or gallops. No S3 or S4.  · Pulmonary/Chest: Clear breath sounds bilaterally. No crackles, wheezes or rhonchi. No respiratory accessory muscle use. · Abdominal: Soft. Normal bowel sounds present. No distension, No tenderness. · Musculoskeletal: No tenderness. No edema    · Lymphadenopathy: Has no cervical adenopathy. · Neurological: Alert and oriented. No gross deficits. · Skin: Skin is warm and dry. No rash, lesions, ulcerations noted. · Psychiatric: No anxiety or agitation. Labs, diagnostic and imaging results reviewed. Reviewed. Recent Labs     04/18/22  1118 04/19/22 0449 04/20/22  0444   * 137 135*   K 3.1* 3.3* 3.1*   CL 91* 94* 94*   CO2 16* 16* 19*   PHOS  --  8.7* 7.9*   BUN 76* 80* 75*   CREATININE 11.5* 11.6* 10.8*     Recent Labs     04/18/22  1118 04/18/22  1118 04/19/22  0449 04/19/22  1350 04/20/22  0444   WBC 5.9  --  6.0  --  5.5   HGB 9.6*   < > 9.2* 9.5* 9.2*   HCT 28.8*   < > 28.3* 29.1* 28.0*   .6*  --  109.5*  --  107.5*     --  305  --  288    < > = values in this interval not displayed. Lab Results   Component Value Date    TROPONINI 0.11 04/18/2022     Estimated Creatinine Clearance: 5 mL/min (A) (based on SCr of 10.8 mg/dL [facility])).    No results found for: BNP  Lab Results   Component Value Date    PROTIME 11.0 04/19/2022    INR 0.97 04/19/2022     No results found for: CHOL, HDL, TRIG    Scheduled Meds:   potassium chloride  40 mEq Oral Daily with breakfast    NIFEdipine  30 mg Oral Daily    dianeal lo-chris 1.5%  4,000 mL IntraPERitoneal Nightly    And    dianeal lo-chris 1.5%  6,000 mL IntraPERitoneal Nightly    allopurinol  100 mg Oral Daily    aspirin  325 mg Oral Daily    b complex-C-folic acid  1 capsule Oral Daily    calcium acetate  1,334 mg Oral TID WC    doxepin  10 mg Oral Nightly    furosemide  80 mg Oral BID    hydrALAZINE  25 mg Oral BID    tamsulosin  0.4 mg Oral Nightly    Vitamin D 2,000 Units Oral Daily    insulin lispro  0-6 Units SubCUTAneous TID WC    insulin lispro  0-3 Units SubCUTAneous Nightly    sodium chloride flush  5-40 mL IntraVENous 2 times per day    heparin (porcine)  5,000 Units SubCUTAneous 3 times per day     Continuous Infusions:   dextrose      sodium chloride       PRN Meds:glucose, dextrose, glucagon (rDNA), dextrose, sodium chloride flush, sodium chloride, ondansetron **OR** ondansetron, polyethylene glycol, acetaminophen **OR** acetaminophen, perflutren lipid microspheres     EC/15/22  Atrial flutter at 93 BPM.    Echo:22   Tachycardia present   Left ventricular cavity size is normal.   Ejection fraction is visually estimated to be 50-55%. Normal LVEF   Indeterminate diastolic function. Normal Aortic valve gradients   Mitral valve leaflets appear mildly thickened. Mild mitral annular calcification. RV is normal    Assessment and Plan:     New atrial flutter              - first seen on EKG on 4/15/22              - v-rates fairly well controlled without any AV janak blocking meds on board              - admits to SOB but unclear if this is from his arrhythmia or pleural effusion or both              - CHADS2-VASc 4 (age, HTN, DM) - would recommend DOAC when bleeding risk acceptable, has bloody drainage from CT and ?need for biopsy              - discussed treatment options with pt including medical therapy, cardioversion and ablation.  Treatment plan will depend on if he has a malignancy and the plan for that - for now will pursue rate control only - on no AV janak blocking meds, will add low dose metoprolol to help keep rate controlled, will discuss further treatment as an outpatient     Right lung mass              - noted on CT scan with R pleural effusion, concerning for malignancy              - s/p CT              - pulmonary following     ESRD              - on PD              - nephrology following    EP issues are stable, will sign off and arrange follow up. Please call with concerns.     YAZ Venegas  [facility], [address] [place] [address], [id number] [place] [address]  Phone: [phone]  Fax: [phone]    Electronically signed by YAZ Aguila - CNP on 4/20/2022 at 8:58 AM

## 2022-04-20 NOTE — PROGRESS NOTES
Pulmonary Progress Note    Date of Admission: 4/18/2022   LOS: 2 days     Chief Complaint   Patient presents with    Other     sent in by kidney doctor for possible issues, has multiple issues, decreased urination, sob when he lays down. Assessment/Plan:       Acute hypoxemic respiratory failure  -Resolved    Right-sided pleural effusion  -Exudate, many lymphocytes suspicious for malignancy given lung mass  -Cytology pending  -Monitor chest tube output likely remove today or tomorrow as he is no longer requiring oxygen. -cxr today    Right upper lobe lung mass  -Neoplasm until proven otherwise, will await cytology from pleural effusion before discussing further biopsy options    ESRD on PD    24 Hour Events/Subjective  Chest tube placed yesterday. Successfully weaned off supplemental oxygen    ROS:   No nausea  No Vomiting  No chest pain      Intake/Output Summary (Last 24 hours) at 4/20/2022 0934  Last data filed at 4/20/2022 0600  Gross per 24 hour   Intake 950 ml   Output 4173 ml   Net -3223 ml         PHYSICAL EXAM:   Blood pressure 96/68, pulse 114, temperature 98.1 °F (36.7 °C), temperature source Oral, resp. rate 22, height 5' 11\" (1.803 m), weight 162 lb 14.7 oz (73.9 kg), SpO2 96 %.'  Gen:  No acute distress. Eyes: PERRL. Anicteric sclera. No conjunctival injection. ENT: No discharge. Posterior oropharynx clear. External appearance of ears and nose normal.  Neck: Trachea midline. No mass   Resp:  No crackles. No wheezes. No rhonchi. No dullness on percussion. CV: Regular rate. Regular rhythm. No murmur or rub. No edema. GI: Soft, Non-tender. Non-distended. +BS  Skin: Warm, dry, w/o erythema. Lymph: No cervical or supraclavicular LAD. M/S: No cyanosis. No clubbing. Neuro:  no focal neurologic deficit. Moves all extremities  Psych: Awake and alert, Oriented x 3. Judgement and insight appropriate. Mood stable.       Medications:    Scheduled Meds:   potassium chloride  40 mEq Oral Daily with breakfast    NIFEdipine  30 mg Oral Daily    dianeal lo-chris 1.5%  4,000 mL IntraPERitoneal Nightly    And    dianeal lo-chris 1.5%  6,000 mL IntraPERitoneal Nightly    allopurinol  100 mg Oral Daily    aspirin  325 mg Oral Daily    b complex-C-folic acid  1 capsule Oral Daily    calcium acetate  1,334 mg Oral TID WC    doxepin  10 mg Oral Nightly    furosemide  80 mg Oral BID    hydrALAZINE  25 mg Oral BID    tamsulosin  0.4 mg Oral Nightly    Vitamin D  2,000 Units Oral Daily    insulin lispro  0-6 Units SubCUTAneous TID WC    insulin lispro  0-3 Units SubCUTAneous Nightly    sodium chloride flush  5-40 mL IntraVENous 2 times per day    heparin (porcine)  5,000 Units SubCUTAneous 3 times per day       Continuous Infusions:   dextrose      sodium chloride         PRN Meds:  glucose, dextrose, glucagon (rDNA), dextrose, sodium chloride flush, sodium chloride, ondansetron **OR** ondansetron, polyethylene glycol, acetaminophen **OR** acetaminophen, perflutren lipid microspheres    Labs reviewed:  CBC:   Recent Labs     04/18/22  1118 04/18/22  1118 04/19/22  0449 04/19/22  1350 04/20/22  0444   WBC 5.9  --  6.0  --  5.5   HGB 9.6*   < > 9.2* 9.5* 9.2*   HCT 28.8*   < > 28.3* 29.1* 28.0*   .6*  --  109.5*  --  107.5*     --  305  --  288    < > = values in this interval not displayed. BMP:   Recent Labs     04/18/22  1118 04/19/22  0449 04/20/22  0444   * 137 135*   K 3.1* 3.3* 3.1*   CL 91* 94* 94*   CO2 16* 16* 19*   PHOS  --  8.7* 7.9*   BUN 76* 80* 75*   CREATININE 11.5* 11.6* 10.8*     LIVER PROFILE:   Recent Labs     04/18/22  1118 04/19/22  0449   AST 21 23   ALT 8* 9*   BILIDIR  --  <0.2   BILITOT <0.2 <0.2   ALKPHOS 106 106     PT/INR:   Recent Labs     04/19/22  1007   PROTIME 11.0   INR 0.97     APTT: No results for input(s): APTT in the last 72 hours.   UA:No results for input(s): NITRITE, COLORU, PHUR, LABCAST, WBCUA, RBCUA, MUCUS, TRICHOMONAS, YEAST, BACTERIA, CLARITYU, SPECGRAV, LEUKOCYTESUR, UROBILINOGEN, BILIRUBINUR, BLOODU, GLUCOSEU, AMORPHOUS in the last 72 hours. Invalid input(s): Scotty Dietz  No results for input(s): PH, PCO2, PO2 in the last 72 hours. Films:  Radiology Review:  Pertinent images / reports were reviewed as a part of this visit. Echo Complete  Transthoracic Echocardiography Report (TTE)     Demographics      Patient Name       John Rojas      Date of Study      04/19/2022         Gender               Male      Patient Number     [id number]         Date of Birth        1940      Visit Number       [id number]          Age                  78 year(s)      Accession Number   [id number]         Room Number          2939      Corporate ID       [id number]           Dulce Maria Mcghee      Ordering Physician Carie Brown M.D. Physician            Arin Vigil MD     Procedure    Type of Study      TTE procedure:ECHOCARDIOGRAM COMPLETE 2D W DOPPLER W COLOR. Procedure Date  Date: 04/19/2022 Start: 02:02 PM    Study Location: [facility] Echo Lab  Technical Quality: Adequate visualization    Indications:Atrial fibrillation. Additional Indications:HTN,DM,. Patient Status: Routine    Height: 71 inches Weight: 162 pounds BSA: 1.93 m2 BMI: 22.59 kg/m2    HR: 101 bpm BP: 100/59 mmHg     Conclusions      Summary   Tachycardia present   Left ventricular cavity size is normal.   Ejection fraction is visually estimated to be 50-55%. Normal LVEF   Indeterminate diastolic function. Normal Aortic valve gradients   Mitral valve leaflets appear mildly thickened. Mild mitral annular calcification.    RV is normal      Signature      ------------------------------------------------------------------   Electronically signed by Arin Vigil MD (Interpreting   physician) on 04/19/2022 at 04:32 PM ------------------------------------------------------------------      Findings      Left Ventricle   Left ventricular cavity size is normal.   Ejection fraction is visually estimated to be 50-55%. There is increased left ventricular wall thickness. Unable to accurately assess wall motion due to arrythmia. Indeterminate diastolic function. Mitral Valve   Trivial mitral regurgitation. No evidence of mitral stenosis. Mitral valve leaflets appear mildly thickened. Mild mitral annular calcification. Left Atrium   The left atrium is normal in size. Aortic Valve   The aortic valve appears normal in structure. The aortic valve is thickened/calcified but opens well with normal   gradients. Trivial aortic regurgitation. No evidence of aortic valve stenosis. Aorta   The aortic root is normal in size   Ascending aorta measures upper limits of normal      Right Ventricle   The right ventricle is normal in size and function. Tricuspid Valve   Tricuspid valve is structurally normal.   Trivial-mild tricuspid regurgitation. No evidence of tricuspid stenosis. Right Atrium   The right atrial size is normal.      Pulmonic Valve   The pulmonic valve is structurally normal.   No evidence of pulmonic valve stenosis. No evidence of pulmonic valve regurgitation. Pericardial Effusion   No pericardial effusion noted. Pleural Effusion   No pleural effusion. Miscellaneous   IVC size is normal (<2.1cm) and collapses > 50% with respiration consistent   with normal RA pressure (3mmHg). Estimated pulmonary artery systolic pressure is normal at 35 mmHg assuming a   right atrial pressure of 3 mmHg.      M-Mode/2D Measurements (cm)      LV Diastolic Dimension: 8.66 cm LV Systolic Dimension: 2.36 cm   LV Septum Diastolic: 1.31 cm   LV PW Diastolic: 0.78 cm        AO Root Dimension: 3.6 cm                                      LA Area: 14.6 cm2   LVOT: 2.2 cm                    LA volume/Index: 46 ml /24 ml/m2     Doppler Measurements      AV Peak Velocity: 113 cm/s     MV Peak E-Wave: 69.9 cm/s   AV Peak Gradient: 5.11 mmHg    MV Peak A-Wave: 29.8 cm/s   LVOT Peak Velocity: 82.5 cm/s  MV E/A Ratio: 2.35                                  MV Mean Gradient: 1 mmHg   TR Velocity:285 cm/s           MV Max P mmHg   TR Gradient:32.49 mmHg         MV Vmax:63.8 cm/s   Estimated RAP:3 mmHg           MV VTI:17.5 cm/s   Estimated RVSP: 35 mmHg   E' Septal Velocity: 7.72 cm/s  MV Area (continuity): 2.97 cm2   E' Lateral Velocity: 6.85 cm/s MV Deceleration Time: 187 msec   PV Peak Velocity: 96.5 cm/s   PV Peak Gradient: 3.72 mmHg      Aortic Valve      Peak Velocity: 113 cm/s   Peak Gradient: 5.11 mmHg     Aorta      Aortic Root: 3.6 cm   Ascending Aorta: 3.7 cm   LVOT Diameter: 2.2 cm     CT GUIDED NEEDLE PLACEMENT  Narrative: PROCEDURE:  CT GUIDED CHEST TUBE PLACEMENT    MODERATE CONSCIOUS SEDATION    2022    HISTORY:  ORDERING SYSTEM PROVIDED HISTORY: pleural effusion  TECHNOLOGIST PROVIDED HISTORY:  Reason for exam:->pleural effusion  Which side should the procedure be performed?->Right  Reason for Exam: pleural effusion    Pneumothorax    SEDATION:  No sedation was used. TECHNIQUE:  Informed consent was obtained after a detailed explanation of the procedure  including risks, benefits, and alternatives. Universal protocol was  followed. A suitable skin site was prepped and draped in sterile fashion  following CT localization. An 18 gauge needle was advanced into the right  pleural space and a 0.035 guidewire was used to place a 10 [place] citizen chest tube  after the fascial tract was dilated. The catheter was sutured to the skin  and the patient tolerated the procedure well. The catheter was attached to  Pleurevac drainage.     Estimated blood loss: Less than 5 cc    Dose modulation, iterative reconstruction, and/or weight based adjustment of  the mA/kV was utilized to reduce the radiation dose to as low as reasonably  achievable. DLP: 384.52 mGy-cm    FINDINGS:  Post procedure images demonstrate the chest tube in good position  Impression: Successful CT guided placement of a 10 [place] Islander right chest tube  CT GUIDED CHEST TUBE  Narrative: PROCEDURE:  CT GUIDED CHEST TUBE PLACEMENT    MODERATE CONSCIOUS SEDATION    4/19/2022    HISTORY:  ORDERING SYSTEM PROVIDED HISTORY: pleural effusion  TECHNOLOGIST PROVIDED HISTORY:  Reason for exam:->pleural effusion  Which side should the procedure be performed?->Right  Reason for Exam: pleural effusion    Pneumothorax    SEDATION:  No sedation was used. TECHNIQUE:  Informed consent was obtained after a detailed explanation of the procedure  including risks, benefits, and alternatives. Universal protocol was  followed. A suitable skin site was prepped and draped in sterile fashion  following CT localization. An 18 gauge needle was advanced into the right  pleural space and a 0.035 guidewire was used to place a 10 [place] Islander chest tube  after the fascial tract was dilated. The catheter was sutured to the skin  and the patient tolerated the procedure well. The catheter was attached to  Pleurevac drainage. Estimated blood loss: Less than 5 cc    Dose modulation, iterative reconstruction, and/or weight based adjustment of  the mA/kV was utilized to reduce the radiation dose to as low as reasonably  achievable. DLP: 881.23 mGy-cm    FINDINGS:  Post procedure images demonstrate the chest tube in good position  Impression: Successful CT guided placement of a 10 [place] Islander right chest tube  US THORACENTESIS Which side should the procedure be performed?  Right  Narrative: PROCEDURE:  ULTRASOUNDGUIDED RIGHT THORACENTESIS    4/19/2022    HISTORY:  ORDERING SYSTEM PROVIDED HISTORY: pleural effusion  TECHNOLOGIST PROVIDED HISTORY:  Reason for exam:->pleural effusion  Which side should the procedure be performed?->Right    TECHNIQUE:  Informed consent was obtained after a detailed explanation of the procedure  including risks, benefits, and alternatives. Universal protocol was  performed. The right chest was prepped and draped in sterile fashion and  local anesthesia was achieved with lidocaine. A 5 [place] needle sheath was  advanced under ultrasound guidance into pleural effusion and thoracentesis  was performed. The patient tolerated the procedure    Estimated blood loss: Less than 5 cc    FINDINGS:  A total of 800 cc dark red fluid was removed and sent for analysis. Impression: Successful ultrasound guided right thoracentesis. This note was transcribed using [id number]. Please disregard any translational errors.     Thank you for this consult,    Darlin Keene MD  [facility] Pulmonary, Critical Care, and Sleep Medicine

## 2022-04-20 NOTE — PROGRESS NOTES
Hospitalist Progress Note      PCP: Olivia Carter MD    Date of Admission: 4/18/2022    Chief Complaint: SOB    Hospital Course: 82m admitted with progressive parapneumonic effusions, worsening sob and creatinine, presenting with hypoxemia and new onset atrial flutter    Subjective: Patient denies chest pain, in no distress on room air, feels better overall      Medications:  Reviewed    Infusion Medications    dextrose      sodium chloride       Scheduled Medications    potassium chloride  40 mEq Oral Daily with breakfast    metoprolol tartrate  25 mg Oral BID    NIFEdipine  30 mg Oral Daily    dianeal lo-chris 1.5%  4,000 mL IntraPERitoneal Nightly    And    dianeal lo-chris 1.5%  6,000 mL IntraPERitoneal Nightly    allopurinol  100 mg Oral Daily    aspirin  325 mg Oral Daily    b complex-C-folic acid  1 capsule Oral Daily    calcium acetate  1,334 mg Oral TID WC    doxepin  10 mg Oral Nightly    furosemide  80 mg Oral BID    hydrALAZINE  25 mg Oral BID    tamsulosin  0.4 mg Oral Nightly    Vitamin D  2,000 Units Oral Daily    insulin lispro  0-6 Units SubCUTAneous TID WC    insulin lispro  0-3 Units SubCUTAneous Nightly    sodium chloride flush  5-40 mL IntraVENous 2 times per day    heparin (porcine)  5,000 Units SubCUTAneous 3 times per day     PRN Meds: glucose, dextrose, glucagon (rDNA), dextrose, sodium chloride flush, sodium chloride, ondansetron **OR** ondansetron, polyethylene glycol, acetaminophen **OR** acetaminophen, perflutren lipid microspheres      Intake/Output Summary (Last 24 hours) at 4/20/2022 1548  Last data filed at 4/20/2022 1441  Gross per 24 hour   Intake 1100 ml   Output 1298 ml   Net -198 ml       Physical Exam Performed:    BP (!) 112/49   Pulse 97   Temp 97.6 °F (36.4 °C) (Oral)   Resp 19   Ht 5' 11\" (1.803 m)   Wt 149 lb 0.5 oz (67.6 kg)   SpO2 98%   BMI 20.79 kg/m²     General appearance: No apparent distress, appears stated age and cooperative.   HEENT: Pupils equal, round, and reactive to light. Conjunctivae/corneas clear. Neck: Supple, with full range of motion. No jugular venous distention. Trachea midline. Respiratory:  Normal respiratory effort. Equal excursion, no use of accessory muscles  Cardiovascular: normal rate, irregularly irregular rhythm  Abdomen: Soft, non-tender, non-distended    Musculoskeletal: No clubbing, cyanosis or edema bilaterally. Skin: Skin color, texture, turgor normal.  No rashes or lesions. Neurologic:   grossly non-focal.  Psychiatric: Alert and oriented, thought content appropriate, normal insight         Labs:   Recent Labs     04/18/22  1118 04/18/22  1118 04/19/22  0449 04/19/22  1350 04/20/22  0444   WBC 5.9  --  6.0  --  5.5   HGB 9.6*   < > 9.2* 9.5* 9.2*   HCT 28.8*   < > 28.3* 29.1* 28.0*     --  305  --  288    < > = values in this interval not displayed. Recent Labs     04/18/22  1118 04/19/22  0449 04/20/22  0444   * 137 135*   K 3.1* 3.3* 3.1*   CL 91* 94* 94*   CO2 16* 16* 19*   BUN 76* 80* 75*   CREATININE 11.5* 11.6* 10.8*   CALCIUM 7.6* 7.5* 7.6*   PHOS  --  8.7* 7.9*     Recent Labs     04/18/22  1118 04/19/22  0449   AST 21 23   ALT 8* 9*   BILIDIR  --  <0.2   BILITOT <0.2 <0.2   ALKPHOS 106 106     Recent Labs     04/19/22  1007   INR 0.97     Recent Labs     04/18/22  1118 04/18/22  1725 04/18/22  1955   TROPONINI 0.11* 0.11* 0.11*       Urinalysis:    No results found for: [place], BACTERIA, RBCUA, BLOODU, SPECGRAV, GLUCOSEU    Radiology:  XR CHEST PORTABLE   Final Result   No significant change. Small right apical pneumothorax. Large right perihilar masslike consolidation is unchanged. Ground-glass   haziness right lung is unchanged         XR CHEST PORTABLE   Final Result   Right basilar chest drain is noted. New, small right pneumothorax. Right pleural effusion is decreased. Right perihilar masslike consolidation, neoplasm versus round pneumonia.       Additional alveolar and ground-glass opacities in the right lung, pulmonary   edema and atelectasis favored over pneumonia. CT GUIDED CHEST TUBE   Final Result   Successful CT guided placement of a 10 [place] Islander right chest tube         CT GUIDED NEEDLE PLACEMENT   Final Result   Successful CT guided placement of a 10 [place] Islander right chest tube         US THORACENTESIS Which side should the procedure be performed? Right   Final Result   Successful ultrasound guided right thoracentesis. CT CHEST PULMONARY EMBOLISM W CONTRAST   Final Result   1. No evidence of pulmonary embolic disease. 2. Redemonstration of dense right upper lobe consolidation anteriorly. Pathologic mediastinal adenopathy. The findings are concerning for   underlying malignancy. Consider pulmonary consultation with possible   sampling. 3. Complex right pleural effusion concerning for malignant effusion. Consider sampling. CT CHEST WO CONTRAST   Final Result   Heterogeneous consolidative change is seen in the right upper lobe and right   middle lobe, which could be due to underlying lung carcinoma given the marked   abnormal mediastinal adenopathy. Heterogeneous areas of nodularity along the   pleura posteriorly on the right raise the question of concomitant pleural   implants. RECOMMENDATIONS:   Unavailable         XR CHEST PORTABLE   Final Result   Increased moderate to large right pleural effusion with basilar volume loss. Increased right perihilar opacification, likely infiltrate although   underlying mass is not excluded. Close imaging surveillance with   consideration of follow-up chest CT is recommended.                  Assessment/Plan:    Active Hospital Problems    Diagnosis Date Noted    Acute hypoxemic respiratory failure (HCC) [J96.01] 04/18/2022     Acute hypoxemic resp failure  - pl effusion/ mass  - chf?    ESRD  - RRT as per Nephrology    Atrial fibrillation  - trop flat, tsh normal, echo pending    Anemia  - stable    Lung Mass / Effusion  - s/p thoracentesis, fluid sent for cytology    DVT Prophylaxis: hepsq  Diet: ADULT DIET; Regular; 4 carb choices (60 gm/meal);  Low Fat/Low Chol/High Fiber/ROME; Low Sodium (2 gm); 1500 ml  ADULT ORAL NUTRITION SUPPLEMENT; Breakfast, Dinner; Diabetic Oral Supplement  Code Status: Full Code         Agustin Torres MD

## 2022-04-20 NOTE — PLAN OF CARE
Problem: Falls - Risk of:  Goal: Will remain free from falls  Description: Will remain free from falls  4/20/2022 1105 by Jeremias Lincoln RN  Outcome: Progressing  4/19/2022 2304 by Aimee Patricia RN  Outcome: Ongoing  Goal: Absence of physical injury  Description: Absence of physical injury  4/20/2022 1105 by Jeremias Lincoln RN  Outcome: Progressing  4/19/2022 2304 by Aimee Patricia RN  Outcome: Ongoing     Problem: Nutrition  Goal: Optimal nutrition therapy  4/20/2022 1105 by Jeremias Lincoln RN  Outcome: Progressing  4/19/2022 2304 by Aimee Patricia RN  Outcome: Ongoing

## 2022-04-20 NOTE — PROGRESS NOTES
[facility]  Phone: [phone]  Fax: [phone]  [email or web address]       We are following the patient for esrd  79 y/o AAM well known to me with h/o ESRD on CCPD seen by his PCP Dr Megha Dewitt today for increased SOB orthopnea weakness and decreased appetite  CR showed R pleural effusion and R hilar fullness  CT  Scan in ER with contrast showed Dense R Upper lobe conslidation mediastinal adenopathy concerning for malignancy Complex R pleural effusion  Concerning for malignant ascites  He reports decreased appetite, wt loss and Decreased UOP recently  O2 sat noted to be 83%placed on O2 Cr noted to be 12 mg  SUBJECTIVE:  Underwent CT guided R CT placement   Breathing better today Not requiring O2  Appetite remains poor  BP running low   No issues with cycler overnight    Family present    OBJECTIVE:     PHYSICAL:    TEMPERATURE:  Current - Temp: 97.6 °F (36.4 °C);  Max - Temp  Av.7 °F (36.5 °C)  Min: 97.4 °F (36.3 °C)  Max: 98.1 °F (36.7 °C)  RESPIRATIONS RANGE: Resp  Av  Min: 19  Max: 22  PULSE RANGE: Pulse  Av.3  Min: 97  Max: 114  BLOOD PRESSURE RANGE:  Systolic (91QEG), JSL:589 , Min:90 , PWR:423   ; Diastolic (73CGD), GNW:20, Min:47, Max:68    PULSE OXIMETRY RANGE: SpO2  Av.1 %  Min: 93 %  Max: 100 %  24HR INTAKE/OUTPUT:      Intake/Output Summary (Last 24 hours) at 2022 1553  Last data filed at 2022 1441  Gross per 24 hour   Intake 1100 ml   Output 1298 ml   Net -198 ml       CONSTITUTIONAL:  Ill appearing NAD  HEENT:  Lids and lashes normal, pupils equal, round and reactive to light  NECK:  Supple, symmetrical, trachea midline, no adenopathy  LUNGS:  R CT in place hemorrhagic fluid noted   CARDIOVASCULAR:  Normal apical impulse, regular rate and rhythm, normal S1 and S2  ABDOMEN:  No scars, normal bowel sounds, soft, non-distended PD catheter in place  NEUROLOGIC:  alert, oriented, normal speech, no focal findings or movement disorder noted  SKIN: no bruising or bleeding  EXTREMITIES: no edema    Medications     dextrose      sodium chloride          Data      CBC:   Recent Labs     04/18/22  1118 04/18/22  1118 04/19/22  0449 04/19/22  1350 04/20/22  0444   WBC 5.9  --  6.0  --  5.5   RBC 2.68*  --  2.59*  --  2.61*   HGB 9.6*   < > 9.2* 9.5* 9.2*   HCT 28.8*   < > 28.3* 29.1* 28.0*     --  305  --  288    < > = values in this interval not displayed.      BMP:    Recent Labs     04/18/22  1118 04/19/22  0449 04/20/22  0444   * 137 135*   K 3.1* 3.3* 3.1*   CL 91* 94* 94*   CO2 16* 16* 19*   BUN 76* 80* 75*   CREATININE 11.5* 11.6* 10.8*   CALCIUM 7.6* 7.5* 7.6*   GLUCOSE 160* 196* 164*     Phosphorus:    Recent Labs     04/19/22  0449 04/20/22  0444   PHOS 8.7* 7.9*     Magnesium:    Recent Labs     04/18/22  1118 04/19/22  0449   MG 2.40 2.50*         ASSESSMENT     Patient Active Problem List   Diagnosis    Anemia in stage 4 chronic kidney disease (HCC)    Chronic kidney disease, stage IV (severe) ([facility])    Acute hypoxemic respiratory failure ([facility])       PLAN    1-ESRD on CCPD Cr noted to be elevated above baseline level On Cycler Time increased to 9 hours with extra 2500 ml exchange Using all  1.5% dianeal  2-R lung consolidation and pleural effusion concerning for malignancy Pulmonary on board S/P R CT placement Awaiting pleural fluid cytology   3-Hypokalemia replace  4 Hyponatremia improved  5 Anemia ESRD holding ARANZA at this time   6 Atrial flutter Cardiology following   7 HTN BP running low Stop Nifedipine  Use 1.5 % dianeal with PD      Demetria Yepez MD, [address]

## 2022-04-21 ENCOUNTER — APPOINTMENT (OUTPATIENT)
Dept: GENERAL RADIOLOGY | Age: 82
DRG: 180 | End: 2022-04-21
Payer: MEDICARE

## 2022-04-21 VITALS
BODY MASS INDEX: 20.86 KG/M2 | HEIGHT: 71 IN | DIASTOLIC BLOOD PRESSURE: 56 MMHG | WEIGHT: 149.03 LBS | RESPIRATION RATE: 13 BRPM | SYSTOLIC BLOOD PRESSURE: 104 MMHG | TEMPERATURE: 97.8 F | HEART RATE: 85 BPM | OXYGEN SATURATION: 97 %

## 2022-04-21 LAB
ALBUMIN SERPL-MCNC: 2.5 G/DL (ref 3.4–5)
ANION GAP SERPL CALCULATED.3IONS-SCNC: 22 MMOL/L (ref 3–16)
BASOPHILS ABSOLUTE: 0.1 K/UL (ref 0–0.2)
BASOPHILS RELATIVE PERCENT: 1.2 %
BUN BLDV-MCNC: 74 MG/DL (ref 7–20)
CALCIUM SERPL-MCNC: 7.4 MG/DL (ref 8.3–10.6)
CHLORIDE BLD-SCNC: 95 MMOL/L (ref 99–110)
CO2: 18 MMOL/L (ref 21–32)
CREAT SERPL-MCNC: 10.8 MG/DL (ref 0.8–1.3)
EOSINOPHILS ABSOLUTE: 0.1 K/UL (ref 0–0.6)
EOSINOPHILS RELATIVE PERCENT: 1.3 %
GFR AFRICAN AMERICAN: 6
GFR NON-AFRICAN AMERICAN: 5
GLUCOSE BLD-MCNC: 142 MG/DL (ref 70–99)
GLUCOSE BLD-MCNC: 161 MG/DL (ref 70–99)
GLUCOSE BLD-MCNC: 186 MG/DL (ref 70–99)
HCT VFR BLD CALC: 27.6 % (ref 40.5–52.5)
HEMOGLOBIN: 9 G/DL (ref 13.5–17.5)
LYMPHOCYTES ABSOLUTE: 0.6 K/UL (ref 1–5.1)
LYMPHOCYTES RELATIVE PERCENT: 8.7 %
MCH RBC QN AUTO: 35.2 PG (ref 26–34)
MCHC RBC AUTO-ENTMCNC: 32.5 G/DL (ref 31–36)
MCV RBC AUTO: 108.3 FL (ref 80–100)
MONOCYTES ABSOLUTE: 0.9 K/UL (ref 0–1.3)
MONOCYTES RELATIVE PERCENT: 14.2 %
NEUTROPHILS ABSOLUTE: 4.8 K/UL (ref 1.7–7.7)
NEUTROPHILS RELATIVE PERCENT: 74.6 %
PDW BLD-RTO: 18.4 % (ref 12.4–15.4)
PERFORMED ON: ABNORMAL
PERFORMED ON: ABNORMAL
PHOSPHORUS: 7.7 MG/DL (ref 2.5–4.9)
PLATELET # BLD: 294 K/UL (ref 135–450)
PMV BLD AUTO: 7.3 FL (ref 5–10.5)
POTASSIUM SERPL-SCNC: 3.6 MMOL/L (ref 3.5–5.1)
RBC # BLD: 2.55 M/UL (ref 4.2–5.9)
SODIUM BLD-SCNC: 135 MMOL/L (ref 136–145)
WBC # BLD: 6.4 K/UL (ref 4–11)

## 2022-04-21 PROCEDURE — 71045 X-RAY EXAM CHEST 1 VIEW: CPT

## 2022-04-21 PROCEDURE — 90945 DIALYSIS ONE EVALUATION: CPT

## 2022-04-21 PROCEDURE — 6360000002 HC RX W HCPCS: Performed by: HOSPITALIST

## 2022-04-21 PROCEDURE — 80069 RENAL FUNCTION PANEL: CPT

## 2022-04-21 PROCEDURE — 97166 OT EVAL MOD COMPLEX 45 MIN: CPT

## 2022-04-21 PROCEDURE — 94760 N-INVAS EAR/PLS OXIMETRY 1: CPT

## 2022-04-21 PROCEDURE — 85025 COMPLETE CBC W/AUTO DIFF WBC: CPT

## 2022-04-21 PROCEDURE — 99232 SBSQ HOSP IP/OBS MODERATE 35: CPT | Performed by: INTERNAL MEDICINE

## 2022-04-21 PROCEDURE — 97162 PT EVAL MOD COMPLEX 30 MIN: CPT | Performed by: PHYSICAL THERAPIST

## 2022-04-21 PROCEDURE — 97116 GAIT TRAINING THERAPY: CPT | Performed by: PHYSICAL THERAPIST

## 2022-04-21 PROCEDURE — 2500000003 HC RX 250 WO HCPCS: Performed by: HOSPITALIST

## 2022-04-21 PROCEDURE — 97530 THERAPEUTIC ACTIVITIES: CPT

## 2022-04-21 PROCEDURE — 2580000003 HC RX 258: Performed by: HOSPITALIST

## 2022-04-21 PROCEDURE — 6370000000 HC RX 637 (ALT 250 FOR IP): Performed by: NURSE PRACTITIONER

## 2022-04-21 PROCEDURE — 6370000000 HC RX 637 (ALT 250 FOR IP): Performed by: INTERNAL MEDICINE

## 2022-04-21 PROCEDURE — 36415 COLL VENOUS BLD VENIPUNCTURE: CPT

## 2022-04-21 PROCEDURE — 97530 THERAPEUTIC ACTIVITIES: CPT | Performed by: PHYSICAL THERAPIST

## 2022-04-21 PROCEDURE — 6370000000 HC RX 637 (ALT 250 FOR IP): Performed by: HOSPITALIST

## 2022-04-21 RX ORDER — HYDRALAZINE HYDROCHLORIDE 10 MG/1
10 TABLET, FILM COATED ORAL 2 TIMES DAILY
Status: DISCONTINUED | OUTPATIENT
Start: 2022-04-21 | End: 2022-04-21 | Stop reason: HOSPADM

## 2022-04-21 RX ORDER — HYDRALAZINE HYDROCHLORIDE 10 MG/1
10 TABLET, FILM COATED ORAL 2 TIMES DAILY
Qty: 90 TABLET | Refills: 3 | Status: ON HOLD | OUTPATIENT
Start: 2022-04-21 | End: 2022-05-06 | Stop reason: ALTCHOICE

## 2022-04-21 RX ADMIN — METOPROLOL TARTRATE 25 MG: 25 TABLET, FILM COATED ORAL at 08:33

## 2022-04-21 RX ADMIN — CALCIUM ACETATE 1334 MG: 667 CAPSULE ORAL at 08:32

## 2022-04-21 RX ADMIN — ALLOPURINOL 100 MG: 100 TABLET ORAL at 08:33

## 2022-04-21 RX ADMIN — HEPARIN SODIUM 5000 UNITS: 5000 INJECTION INTRAVENOUS; SUBCUTANEOUS at 06:16

## 2022-04-21 RX ADMIN — HYDRALAZINE HYDROCHLORIDE 25 MG: 25 TABLET, FILM COATED ORAL at 08:33

## 2022-04-21 RX ADMIN — FUROSEMIDE 80 MG: 40 TABLET ORAL at 08:33

## 2022-04-21 RX ADMIN — INSULIN LISPRO 1 UNITS: 100 INJECTION, SOLUTION INTRAVENOUS; SUBCUTANEOUS at 08:34

## 2022-04-21 RX ADMIN — CALCIUM ACETATE 1334 MG: 667 CAPSULE ORAL at 12:23

## 2022-04-21 RX ADMIN — INSULIN LISPRO 1 UNITS: 100 INJECTION, SOLUTION INTRAVENOUS; SUBCUTANEOUS at 12:23

## 2022-04-21 RX ADMIN — Medication 2000 UNITS: at 08:33

## 2022-04-21 RX ADMIN — NEPHROCAP 1 MG: 1 CAP ORAL at 08:32

## 2022-04-21 RX ADMIN — POTASSIUM CHLORIDE 40 MEQ: 750 TABLET, EXTENDED RELEASE ORAL at 08:33

## 2022-04-21 RX ADMIN — SODIUM CHLORIDE, PRESERVATIVE FREE 10 ML: 5 INJECTION INTRAVENOUS at 10:05

## 2022-04-21 RX ADMIN — ASPIRIN 325 MG: 325 TABLET, COATED ORAL at 08:32

## 2022-04-21 ASSESSMENT — PAIN SCALES - GENERAL: PAINLEVEL_OUTOF10: 0

## 2022-04-21 ASSESSMENT — PAIN DESCRIPTION - PROGRESSION
CLINICAL_PROGRESSION: GRADUALLY IMPROVING

## 2022-04-21 NOTE — PLAN OF CARE
Problem: Falls - Risk of:  Goal: Will remain free from falls  Outcome: Progressing     Problem: Falls - Risk of:  Goal: Absence of physical injury  Outcome: Progressing     Problem: Skin Integrity:  Goal: Will show no infection signs and symptoms  Outcome: Progressing     Problem: Skin Integrity:  Goal: Absence of new skin breakdown  Outcome: Progressing     Problem: Nutrition  Goal: Optimal nutrition therapy  Outcome: Progressing     Problem: Discharge Planning  Goal: Discharge to home or other facility with appropriate resources  Outcome: Progressing

## 2022-04-21 NOTE — PROGRESS NOTES
Occupational Therapy  Facility/Department: [facility] PROGRESSIVE CARE  Occupational Therapy Initial Assessment    Name: Kelley Calles  : 1940  MRN: [id number]  Date of Service: 2022    Discharge Recommendations:  24 hour supervision or assist,Home with Home health OT     Kelley Calles scored a 19/24 on the AM-PAC ADL Inpatient form. Current research shows that an AM-PAC score of 18 or greater is typically associated with a discharge to the patient's home setting. Based on the patient's AM-PAC score, and their current ADL deficits, it is recommended that the patient have 2-3 sessions per week of Occupational Therapy at d/c to increase the patient's independence. At this time, this patient demonstrates the endurance and safety to discharge home with 24 hour assist and a home OT follow up treatment frequency of 2-3x/wk. Please see assessment section for further patient specific details. If patient discharges prior to next session this note will serve as a discharge summary. Please see below for the latest assessment towards goals. Patient Diagnosis(es): The primary encounter diagnosis was Acute respiratory failure with hypoxia ([facility] [address] Diagnoses of Abnormal CT of the chest, ESRD on dialysis [facility]), and Hypokalemia were also pertinent to this visit. 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 Past Surgical History:  has a past surgical history that includes joint replacement; Colonoscopy; and CT GUIDED CHEST TUBE (2022). Assessment   Performance deficits / Impairments: Decreased functional mobility ; Decreased ADL status; Decreased strength;Decreased cognition;Decreased balance;Decreased endurance  Assessment: Pt functioning below baseline d/t the above deficits, today requiring CGA fxl transfers/mobility with RW limited to room distances d/t fatigue and chronic back pain. Anticipate pt would require overall min A for ADLs. Will cont to see on acute to address the above limitations and maximize pt's safety and independence. Anticipate pt will be safe to return home with family's assist and home OT. Prognosis: Good  Decision Making: Medium Complexity  REQUIRES OT FOLLOW-UP: Yes  Activity Tolerance  Activity Tolerance: Patient limited by fatigue;Patient limited by pain        Plan   Plan  Times per Week: 3-5  Current Treatment Recommendations: Strengthening,Balance training,Functional mobility training,Endurance training,Self-Care / ADL,Equipment evaluation, education, & procurement,Safety education & training     Restrictions  Restrictions/Precautions  Restrictions/Precautions: Fall Risk    Subjective   General  Chart Reviewed: Yes  Additional Pertinent Hx: Pt is an 78 y.o. male presenting with for increased SOB orthopnea weakness, decreased appetite, and abnormal labs showing worsening renal function. .CR showed R pleural effusion and R hilar fullness. CT  Scan in ER with contrast showed Dense R Upper lobe conslidation mediastinal adenopathy concerning for malignancy Complex R pleural effusion  Concerning for malignant ascites. Pt also noted to have hypoxia and new onset atrial flutter. Pt underwent US-guided right thoracentesis and CT-guided right chest tube placement on 4/19. Chest tube removed 4/20. Family / Caregiver Present: No  Referring Practitioner: Shyanne Ross MD  Subjective  Subjective: Pt met b/s for OT eval/tx. Pt in bed on arrival, agreeable to participate in therapy. Pt denies pain at rest, but reports chronic back pain with activity--did not rate.     Social/Functional History  Social/Functional History  Lives With: Family (wife, brother, son, 2 adult grandsons, and DIL)  Type of Home: House  Home Layout: Two level,Performs ADL's on one level,Able to Live on Main level with bedroom/bathroom,Laundry in basement  Home Access: Level entry  Entrance Stairs - Number of Steps: 2  Bathroom Shower/Tub: Walk-in shower,Shower chair with back  Bathroom Toilet: Handicap height  Bathroom Equipment: Grab bars in shower,Shower chair,Hand-held shower,Grab bars around toilet  Bathroom Accessibility: Walker accessible  Home Equipment: Reggy Deutscher, rolling  Has the patient had two or more falls in the past year or any fall with injury in the past year?: No  ADL Assistance: [facility]: Needs assistance (daughter does laundry, family does cooking, cleaning, and grocery shopping)  Ambulation Assistance: Independent (with quad cane)  Transfer Assistance: Independent  Active : No  Patient's  Info: son or DIL  Occupation: Retired  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 ADL  Additional Comments: Anticipate pt is independent feeding, SBA grooming, min A bathing, dressing, and toileting based on ROM/strength, balance, endurance.      Activity Tolerance  Activity Tolerance: Patient tolerated treatment well;Patient limited by endurance     Bed mobility  Supine to Sit: Stand by assistance (HOB elevated)  Sit to Supine: Unable to assess (pt seated in recliner at end of session)     Transfers  Sit to stand: Contact guard assistance  Stand to sit: Contact guard assistance  Transfer Comments: to/from RW     Cognition  Overall Cognitive Status: Exceptions  Memory: Decreased short term memory  Insights: Decreased awareness of deficits     LUE AROM (degrees)  LUE AROM : WFL  RUE AROM (degrees)  RUE AROM : [facility]                       AM-PAC Score        AM-PAC Inpatient Daily Activity Raw Score: 19 (04/21/22 1435)  AM-PAC Inpatient ADL T-Scale Score : 40.22 (04/21/22 1435)  ADL Inpatient CMS 0-100% Score: 42.8 (04/21/22 1435)  ADL Inpatient CMS G-Code Modifier : CK (04/21/22 1435)    Goals  Short Term Goals  Time Frame for Short term goals: prior to d/c:  Short Term Goal 1: Pt will bathe with supervision. Short Term Goal 2: Pt will dress with supervision. Short Term Goal 3: Pt will toilet with supervision. Short Term Goal 4: Pt will complete fxl mobility and fxl transfers to/from ADL surfaces with supervision using LRAD. Short Term Goal 5: Pt will tolerate standing >5 minutes for functional task with supervision to improve standing tolerance for ADL routine. Long Term Goals  Time Frame for Long term goals : STGs=LTGs  Patient Goals   Patient goals : to return home.        Therapy Time   Individual Concurrent Group Co-treatment   Time In 1340         Time Out 1420         Minutes 40         Timed Code Treatment Minutes: [address] [facility], OTR/L 1051

## 2022-04-21 NOTE — PROGRESS NOTES
Physical Therapy  Facility/Department: [address] PROGRESSIVE CARE  Physical Therapy Initial Assessment    Name: Vicky Segura  : 1940  MRN: [id number]  Date of Service: 2022    Discharge Recommendations:  24 hour supervision or assist,Home with Home health PT   PT Equipment Recommendations  Equipment Needed: No    Vicky Segura scored a 19/24 on the AM-PAC short mobility form. Current research shows that an AM-PAC score of 18 or greater is typically associated with a discharge to the patient's home setting. Based on the patient's AM-PAC score and their current functional mobility deficits, it is recommended that the patient have 2-3 sessions per week of Physical Therapy at d/c to increase the patient's independence. At this time, this patient demonstrates the endurance and safety to discharge home with The home BP readings have been in the home PT and a follow up treatment frequency of 2-3x/wk. Please see assessment section for further patient specific details. If patient discharges prior to next session this note will serve as a discharge summary. Please see below for the latest assessment towards goals. Patient Diagnosis(es): The primary encounter diagnosis was Acute respiratory failure with hypoxia ([facility] [address] Diagnoses of Abnormal CT of the chest, ESRD on dialysis [facility]), and Hypokalemia were also pertinent to this visit. 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 Past Surgical History:  has a past surgical history that includes joint replacement; Colonoscopy; and CT GUIDED CHEST TUBE (2022).     Assessment   Body Structures, Functions, Activity Limitations Requiring Skilled Therapeutic Intervention: Decreased functional mobility   Assessment: 78 y.o. male with ESRD on CAPD, presents with progressive shortness of breath over the past 3 months. Past outpatient chest xrays have demonstrated a R peffusion. He endorses CAPD compliance with CAPD. He recently had outpatient labs/cxr showing progression of renal disease and pleural effusion prompting ER consultation today. Patient denies chest pain, fever, chills, palpitations, abd pain, n/v/d. He denies home O2 dependence, and presents hypoxemic requiring 4 lpm NC O2.\" Pulmonology note indicates: Exudate, many lymphocytes suspicious for malignancy given lung mass-Cytology pending-Suspect persistent pneumothorax is trapped lung likely from malignant process. Pt at baseline lives with family who can provide assist as needed; recommend home with 24/7 assist and home PT  Therapy Prognosis: Good  Decision Making: Medium Complexity  Requires PT Follow-Up: Yes  Activity Tolerance  Activity Tolerance: Patient tolerated treatment well;Patient limited by endurance     Plan   Plan  Plan: 3-5 times per week  Current Treatment Recommendations: Functional mobility training  Safety Devices  Type of Devices: Call light within reach,Patient at risk for falls,Left in chair,All fall risk precautions in place,Nurse notified,Chair alarm in place     Restrictions  Restrictions/Precautions  Restrictions/Precautions: Fall Risk     Subjective   General  Chart Reviewed: Yes  Additional Pertinent Hx: per Dr Bianka Olivas note: \"78 y.o. male with ESRD on CAPD, presents with progressive shortness of breath over the past 3 months. Past outpatient chest xrays have demonstrated a R peffusion. He endorses CAPD compliance with CAPD. He recently had outpatient labs/cxr showing progression of renal disease and pleural effusion prompting ER consultation today. Patient denies chest pain, fever, chills, palpitations, abd pain, n/v/d.  He denies home O2 dependence, and presents hypoxemic requiring 4 lpm NC O2.\" Pulmonology note indicates: Exudate, many lymphocytes suspicious for malignancy given lung mass-Cytology pending-Suspect persistent pneumothorax is trapped lung likely from malignant process-We will await cytology if effusion recurs would be a candidate for palliative Pleurx if malignant-If cytology is negative we will need to pursue other biopsy modalities  Response To Previous Treatment: Not applicable  Family / Caregiver Present: No  Referring Practitioner: Dr Beatrice Hair  Referral Date : 04/21/22  Follows Commands: Within Functional Limits  Subjective  Subjective: pt very pleasant and agreeable to working with therapy         Social/Functional History  Social/Functional History  Lives With: Family (wife, brother, son, 2 adult grandsons, and DIL)  Type of Home: House  Home Layout: Two level,Performs ADL's on one level,Able to Live on Main level with bedroom/bathroom,Laundry in basement  Home Access: Level entry  Entrance Stairs - Number of Steps: 2  Bathroom Shower/Tub: Walk-in shower,Shower chair with back  [employer]: Handicap height  Bathroom Equipment: Grab bars in shower,Shower chair,Hand-held shower,Grab bars around toilet  Bathroom Accessibility: Walker accessible  Home Equipment: Cane, quad,Walker, rolling  Has the patient had two or more falls in the past year or any fall with injury in the past year?: No  ADL Assistance: Independent  Homemaking Assistance: Needs assistance (daughter does laundry, family does cooking, cleaning, and grocery shopping)  Ambulation Assistance: Independent (with quad cane)  Transfer Assistance: Independent  Active : No  Patient's  Info: son or DIL  Occupation: Retired  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  IADL Comments: pt sleeps in regular bed  Vision/Hearing  Hearing: Exceptions to [facility]  Hearing Exceptions: Hard of hearing/hearing concerns; No hearing aid    Cognition   Orientation  Overall Orientation Status: Within Functional Limits  Orientation Level: Oriented to person;Oriented to time;Oriented to place;Oriented to situation     Objective               AROM RLE (degrees)  RLE AROM: WFL  AROM LLE (degrees)  LLE AROM : WFL  Strength RLE  Strength RLE: WFL  Comment: functionally poor  Strength LLE  Strength LLE: WFL  Comment: functionally poor           Bed mobility  Supine to Sit: Stand by assistance (HOB elevated)  Sit to Supine: Unable to assess (pt seated in recliner at end of session)  Transfers  Sit to Stand: Stand by assistance;Contact guard assistance  Stand to sit: Stand by assistance;Contact guard assistance  Ambulation  Surface: level tile  Device: Rolling Walker  Assistance: Contact guard assistance  Quality of Gait: slow small steps; slightly wobbly but no overt LOB  Distance: 36'  Comments: therapist managed lines for heart monitor and continuous O2     Balance  Comments: SBA for sitting balance; CGA for standing with RW           OutComes Score                                                  AM-PAC Score  AM-PAC Inpatient Mobility Raw Score : 19 (04/21/22 1434)  AM-PAC Inpatient T-Scale Score : 45.44 (04/21/22 1434)  Mobility Inpatient CMS 0-100% Score: 41.77 (04/21/22 1434)  Mobility Inpatient CMS G-Code Modifier : CK (04/21/22 1434)          Goals  Long Term Goals  Time Frame for Long term goals : by discharge  Long term goal 1: transfers MI  Long term goal 2: amb 100' with LRAD SBA  Patient Goals   Patient goals : to return home       Therapy Time   Individual Concurrent Group Co-treatment   Time In 1340         Time Out 1420         Minutes 40                 S[address] L[address] PT    Electronically signed by SALVADOR LANE PT on 4/21/2022 at 2:37 PM

## 2022-04-21 NOTE — PROGRESS NOTES
Discharge instructions reviewed over the phone with daughter. Discharge instructions reviewed with pt. Spoke with them about medication changes, follow up appts. Both verbalized understanding. Awaiting medications from retail pharmacy and son to transport home.  Electronically signed by Pino Maier RN on 4/21/2022 at 4:03 PM

## 2022-04-21 NOTE — CARE COORDINATION
CASE MANAGEMENT DISCHARGE SUMMARY: Discharge order noted. No needs identified. Patient stated he has PD supplies at home. Has a FWW at home.      DISCHARGE DATE: 4/21/22    DISCHARGED TO HOME     TRANSPORTATION: Family             TIME: Afternoon     PREFERRED PHARMACY: [facility] (Meds to Beds)              Skyla MERINON RN  Case Management  [phone]    Electronically signed by Skyla Roca RN on 4/21/2022 at 2:58 PM

## 2022-04-21 NOTE — ACP (ADVANCE CARE PLANNING)
Advance Care Planning     Advance Care Planning Activator (Inpatient)  Conversation Note      Date of ACP Conversation: 4/21/2022     Conversation Conducted with: Patient with Decision Making Capacity    ACP Activator: Kike Magana Decision Maker:     Current Designated Health Care Decision Maker:     Primary Decision Maker: Shahla Aspen Spouse - [phone]      Care Preferences    Ventilation: \"If you were in your present state of health and suddenly became very ill and were unable to breathe on your own, what would your preference be about the use of a ventilator (breathing machine) if it were available to you? \"      Would the patient desire the use of ventilator (breathing machine)?: yes    \"If your health worsens and it becomes clear that your chance of recovery is unlikely, what would your preference be about the use of a ventilator (breathing machine) if it were available to you? \"     Would the patient desire the use of ventilator (breathing machine)?: Unsure      Resuscitation  \"CPR works best to restart the heart when there is a sudden event, like a heart attack, in someone who is otherwise healthy. Unfortunately, CPR does not typically restart the heart for people who have serious health conditions or who are very sick. \"    \"In the event your heart stopped as a result of an underlying serious health condition, would you want attempts to be made to restart your heart (answer \"yes\" for attempt to resuscitate) or would you prefer a natural death (answer \"no\" for do not attempt to resuscitate)? \" yes       [] Yes   [x] No   Educated Patient / Latanya Chambers regarding differences between Advance Directives and portable DNR orders.     Length of ACP Conversation in minutes:  5    Conversation Outcomes:  [x] ACP discussion completed  [] Existing advance directive reviewed with patient; no changes to patient's previously recorded wishes  [] New Advance Directive completed  [] Portable Do Not Rescitate prepared for Provider review and signature  [] POLST/POST/MOLST/MOST prepared for Provider review and signature      Follow-up plan:    [] Schedule follow-up conversation to continue planning  [] Referred individual to Provider for additional questions/concerns   [] Advised patient/agent/surrogate to review completed ACP document and update if needed with changes in condition, patient preferences or care setting    [x] This note routed to one or more involved healthcare providers    Nael MADDEN RN  Case Management  [phone]    Electronically signed by Nael Pugh RN on 4/21/2022 at 3:01 PM

## 2022-04-21 NOTE — DISCHARGE SUMMARY
[facility] OR[facility] AND SP[facility]   Discharge Summary    Patient:  Zhane Soliz  YOB: 1940    MRN: [id number]   Acct: [de-identified]    Primary Care Physician: Kaye Peguero MD    Admit date:  4/18/2022    Discharge date:   4/21/22      Discharge Diagnoses:   Acute hypoxemic respiratory failure ([address]  Principal Problem:    Acute hypoxemic respiratory failure ([address]    Malignant pleural effusion    Lung mass    ESRD    HTN    New onset atrial flutter        Admitted for: (HPI) SOB    Hospital Course:  82m admitted with progressive parapneumonic effusions, worsening sob and creatinine, presenting with hypoxemia and new onset atrial flutter. Underwent chest tube placement with pleural fluid (+) for non small cell CA adenocarcinoma. Echocardiogram showed inc lef ventricular wall thickness, normal EF, indeterminate diastolic function, serial trop negative, with recommendation for doac held due to anemia / blood in pleural effusion.            Consultants:  Lachelle Tejeda - Dr. Ashly Akbar ; Edmar Angela - Dr. Kiya Dodson ; Nephro - Dr. Theodore Roberson    Discharge Medications:       Medication List      START taking these medications    metoprolol tartrate 25 MG tablet  Commonly known as: LOPRESSOR  Take 1 tablet by mouth 2 times daily        CHANGE how you take these medications    hydrALAZINE 10 MG tablet  Commonly known as: APRESOLINE  Take 1 tablet by mouth 2 times daily  What changed:   · medication strength  · how much to take        CONTINUE taking these medications    allopurinol 100 MG tablet  Commonly known as: ZYLOPRIM     aspirin 325 MG EC tablet     b complex-C-folic acid 1 MG capsule     calcium acetate 667 MG Tabs     doxepin 10 MG capsule  Commonly known as: SINEQUAN     furosemide 40 MG tablet  Commonly known as: LASIX     tamsulosin 0.4 MG capsule  Commonly known as: FLOMAX     Tradjenta 5 MG tablet  Generic drug: linagliptin     vitamin D 25 MCG (1000 UT) Tabs tablet  Commonly known as: CHOLECALCIFEROL        STOP taking these medications    NIFEdipine 60 MG extended release tablet  Commonly known as: PROCARDIA XL           Where to Get Your Medications      These medications were sent to [address] [facility], [address] - F [phone] [address] [address], [address] [facility]    Phone: [phone]   · hydrALAZINE 10 MG tablet  · metoprolol tartrate 25 MG tablet           Physical Exam:    Vitals:  Vitals:    04/20/22 2338 04/21/22 0415 04/21/22 0754 04/21/22 1139   BP: 95/60 91/60 112/67 (!) 104/56   Pulse: 76 99 99 85   Resp: 21 16 23 13   Temp: 98.1 °F (36.7 °C) 98.3 °F (36.8 °C) 97.2 °F (36.2 °C) 97.8 °F (36.6 °C)   TempSrc: Axillary  Oral Axillary   SpO2: 95% 94% 94% 97%   Weight:       Height:         Weight: Weight: 149 lb 0.5 oz (67.6 kg)     24 hour intake/output:    Intake/Output Summary (Last 24 hours) at 4/21/2022 1409  Last data filed at 4/21/2022 1055  Gross per 24 hour   Intake 600 ml   Output 85 ml   Net 515 ml       General appearance - alert, well appearing, and in no distress  Chest - clear to auscultation, no wheezes, rales or rhonchi, symmetric air entry  Heart - normal rate, regular rhythm, normal S1, S2, no murmurs, rubs, clicks or gallops  Abdomen - soft, nontender, nondistended, no masses or organomegaly  Obese: No; Protuberant: No   Neurological - alert, oriented, normal speech, no focal findings or movement disorder noted  Extremities - peripheral pulses normal, no pedal edema, no clubbing or cyanosis  Skin - normal coloration and turgor     Radiology reports as per the Radiologist  Radiology: Echo Complete    Result Date: 4/19/2022  Transthoracic Echocardiography Report (TTE)  Demographics   Patient Name       Kandi Harada   Date of Study      04/19/2022         Gender               Male   Patient Number     [id number]         Date of Birth        1940   Visit Number       [id number]          Age                  78 year(s)   Accession Number   [id number]         Room Number          0662   Corporate ID       [id number]           Sabrina Naidu   Ordering Physician Jose J Soriano M.D. Physician            Sada Rodriguez MD  Procedure Type of Study   TTE procedure:ECHOCARDIOGRAM COMPLETE 2D W DOPPLER W COLOR. Procedure Date Date: 04/19/2022 Start: 02:02 PM Study Location: [facility] Technical Quality: Adequate visualization Indications:Atrial fibrillation. Additional Indications:HTN,DM,. Patient Status: Routine Height: 71 inches Weight: 162 pounds BSA: 1.93 m2 BMI: 22.59 kg/m2 HR: 101 bpm BP: 100/59 mmHg  Conclusions   Summary  Tachycardia present  Left ventricular cavity size is normal.  Ejection fraction is visually estimated to be 50-55%. Normal LVEF  Indeterminate diastolic function. Normal Aortic valve gradients  Mitral valve leaflets appear mildly thickened. Mild mitral annular calcification. RV is normal   Signature   ------------------------------------------------------------------  Electronically signed by Sada Rodriguez MD (Interpreting  physician) on 04/19/2022 at 04:32 PM  ------------------------------------------------------------------   Findings   Left Ventricle  Left ventricular cavity size is normal.  Ejection fraction is visually estimated to be 50-55%. There is increased left ventricular wall thickness. Unable to accurately assess wall motion due to arrythmia. Indeterminate diastolic function. Mitral Valve  Trivial mitral regurgitation. No evidence of mitral stenosis. Mitral valve leaflets appear mildly thickened. Mild mitral annular calcification. Left Atrium  The left atrium is normal in size. Aortic Valve  The aortic valve appears normal in structure. The aortic valve is thickened/calcified but opens well with normal  gradients. Trivial aortic regurgitation. No evidence of aortic valve stenosis. Aorta  The aortic root is normal in size  Ascending aorta measures upper limits of normal   Right Ventricle  The right ventricle is normal in size and function. Tricuspid Valve  Tricuspid valve is structurally normal.  Trivial-mild tricuspid regurgitation. No evidence of tricuspid stenosis. Right Atrium  The right atrial size is normal.   Pulmonic Valve  The pulmonic valve is structurally normal.  No evidence of pulmonic valve stenosis. No evidence of pulmonic valve regurgitation. Pericardial Effusion  No pericardial effusion noted. Pleural Effusion  No pleural effusion. Miscellaneous  IVC size is normal (<2.1cm) and collapses > 50% with respiration consistent  with normal RA pressure (3mmHg). Estimated pulmonary artery systolic pressure is normal at 35 mmHg assuming a  right atrial pressure of 3 mmHg.   M-Mode/2D Measurements (cm)   LV Diastolic Dimension: 3.69 cm LV Systolic Dimension: 0.40 cm  LV Septum Diastolic: 5.37 cm  LV PW Diastolic: 7.82 cm        AO Root Dimension: 3.6 cm                                   LA Area: 14.6 cm2  LVOT: 2.2 cm                    LA volume/Index: 46 ml /24 ml/m2  Doppler Measurements   AV Peak Velocity: 113 cm/s     MV Peak E-Wave: 69.9 cm/s  AV Peak Gradient: 5.11 mmHg    MV Peak A-Wave: 29.8 cm/s  LVOT Peak Velocity: 82.5 cm/s  MV E/A Ratio: 2.35                                 MV Mean Gradient: 1 mmHg  TR Velocity:285 cm/s           MV Max P mmHg  TR Gradient:32.49 mmHg         MV Vmax:63.8 cm/s  Estimated RAP:3 mmHg           MV VTI:17.5 cm/s  Estimated RVSP: 35 mmHg  E' Septal Velocity: 7.72 cm/s  MV Area (continuity): 2.97 cm2  E' Lateral Velocity: 6.85 cm/s MV Deceleration Time: 187 msec  PV Peak Velocity: 96.5 cm/s  PV Peak Gradient: 3.72 mmHg   Aortic Valve   Peak Velocity: 113 cm/s  Peak Gradient: 5.11 mmHg  Aorta   Aortic Root: 3.6 cm  Ascending Aorta: 3.7 cm  LVOT Diameter: 2.2 cm      CT CHEST WO CONTRAST    Result Date: 2022  EXAMINATION: CT OF THE CHEST WITHOUT CONTRAST 4/18/2022 12:12 pm TECHNIQUE: CT of the chest was performed without the administration of intravenous contrast. Multiplanar reformatted images are provided for review. Dose modulation, iterative reconstruction, and/or weight based adjustment of the mA/kV was utilized to reduce the radiation dose to as low as reasonably achievable. COMPARISON: Recent x-ray HISTORY: ORDERING SYSTEM PROVIDED HISTORY: pleural effusion, new right infiltrate vs mass? TECHNOLOGIST PROVIDED HISTORY: Reason for exam:->pleural effusion, new right infiltrate vs mass? Decision Support Exception - unselect if not a suspected or confirmed emergency medical condition->Emergency Medical Condition (MA) Reason for Exam: pleural effusion, new right infiltrate vs mass FINDINGS: Mediastinum: Thyroid gland is unremarkable. There is abnormal mediastinal-hilar adenopathy. An index right lower paratracheal-subcarinal mass measures 3.5 cm x 3.3 cm. Some of the lymph nodes contain calcification internally. Atherosclerotic change seen in aorta. Aortic annulus calcification is seen. Severe coronary artery calcification is seen. Lungs/pleura: No focal consolidation is seen on the left. No obstructing endobronchial lesions are seen on the left On the right, moderate to large right-sided pleural effusion is seen. There is adjacent consolidation at the right lung base. There is subtle nodularity suggested along the pleura posteriorly on the right. Heterogeneous masslike consolidation is seen in the superomedial right upper lobe, as well as the right middle lobe. A few air bronchograms are also seen. Upper Abdomen: Right adrenal gland is normal.  Left adrenal gland is normal. Punctate vascular calcifications are seen in the left kidney Soft Tissues/Bones: Spurring is seen in the spine. Spurring is seen in the shoulder joints.      Heterogeneous consolidative change is seen in the right upper lobe and right middle lobe, which could be due to underlying lung carcinoma given the marked abnormal mediastinal adenopathy. Heterogeneous areas of nodularity along the pleura posteriorly on the right raise the question of concomitant pleural implants. RECOMMENDATIONS: Unavailable     CT GUIDED NEEDLE PLACEMENT    Result Date: 4/19/2022  PROCEDURE: CT GUIDED CHEST TUBE PLACEMENT MODERATE CONSCIOUS SEDATION 4/19/2022 HISTORY: ORDERING SYSTEM PROVIDED HISTORY: pleural effusion TECHNOLOGIST PROVIDED HISTORY: Reason for exam:->pleural effusion Which side should the procedure be performed?->Right Reason for Exam: pleural effusion Pneumothorax SEDATION: No sedation was used. TECHNIQUE: Informed consent was obtained after a detailed explanation of the procedure including risks, benefits, and alternatives. Universal protocol was followed. A suitable skin site was prepped and draped in sterile fashion following CT localization. An 18 gauge needle was advanced into the right pleural space and a 0.035 guidewire was used to place a 10 Ukrainian chest tube after the fascial tract was dilated. The catheter was sutured to the skin and the patient tolerated the procedure well. The catheter was attached to Pleurevac drainage. Estimated blood loss: Less than 5 cc Dose modulation, iterative reconstruction, and/or weight based adjustment of the mA/kV was utilized to reduce the radiation dose to as low as reasonably achievable. DLP: 881.23 mGy-cm FINDINGS: Post procedure images demonstrate the chest tube in good position     Successful CT guided placement of a 10 Ukrainian right chest tube     XR CHEST PORTABLE    Result Date: 4/18/2022  EXAMINATION: ONE XRAY VIEW OF THE CHEST 4/18/2022 11:17 am COMPARISON: 04/11/2022. HISTORY: ORDERING SYSTEM PROVIDED HISTORY: orthopnea TECHNOLOGIST PROVIDED HISTORY: Reason for exam:->orthopnea Reason for Exam: orthopnea FINDINGS: Moderate to large right pleural effusion, increased compared to the previous study.   There is associated right basilar volume loss. Increased right perihilar opacity. The left lung is clear with no left pleural effusion. The cardiac silhouette is grossly stable. Dense aortic vascular calcification. Increased moderate to large right pleural effusion with basilar volume loss. Increased right perihilar opacification, likely infiltrate although underlying mass is not excluded. Close imaging surveillance with consideration of follow-up chest CT is recommended. CT CHEST PULMONARY EMBOLISM W CONTRAST    Result Date: 4/18/2022  EXAMINATION: CTA OF THE CHEST 4/18/2022 2:04 pm TECHNIQUE: CTA of the chest was performed after the administration of intravenous contrast.  Multiplanar reformatted images are provided for review. MIP images are provided for review. Dose modulation, iterative reconstruction, and/or weight based adjustment of the mA/kV was utilized to reduce the radiation dose to as low as reasonably achievable. COMPARISON: CT chest earlier today. HISTORY: ORDERING SYSTEM PROVIDED HISTORY: ESRD on dialysis, new orthopnea, probable lung cancer on ct wo, r/o pe TECHNOLOGIST PROVIDED HISTORY: Reason for exam:->ESRD on dialysis, new orthopnea, probable lung cancer on ct wo, r/o pe Decision Support Exception - unselect if not a suspected or confirmed emergency medical condition->Emergency Medical Condition (MA) Reason for Exam: ESRD on dialysis, new orthopnea, probable lung cancer on ct wo, r/o pe FINDINGS: Pulmonary Arteries: Pulmonary arteries are adequately opacified for evaluation. No evidence of intraluminal filling defect to suggest pulmonary embolism. Enlarged main pulmonary artery at 3.7 cm. Mediastinum: The thoracic aorta is normal in caliber with calcified atherosclerotic plaque. The heart is mildly enlarged with coronary vascular calcification and no pericardial effusion. The esophagus is unremarkable. There is redemonstration of mediastinal adenopathy as described earlier.  The largest right paratracheal/precarinal node measures 2.6 x 3.9 cm. Lungs/pleura: Moderate to large right pleural effusion with extensive atelectasis in the basal portions of the right lower lobe and right middle lobe. The effusion is mildly complex in attenuation with questionable soft tissue attenuation particularly in the upper portions of the fluid. Consolidative opacity in the right upper lobe anteriorly is again noted. There is patchy interstitial changes throughout the aerated right lung. The left lung is clear. No left pleural effusion. The central airways are patent. Upper Abdomen: Small quantity of upper abdominal ascites. The visualized upper abdominal viscera are unremarkable. Soft Tissues/Bones: No acute osseous or soft tissue abnormality. Prominent multilevel degenerative changes in the lower cervical spine with mild degenerative change throughout the thoracic spine. 1. No evidence of pulmonary embolic disease. 2. Redemonstration of dense right upper lobe consolidation anteriorly. Pathologic mediastinal adenopathy. The findings are concerning for underlying malignancy. Consider pulmonary consultation with possible sampling. 3. Complex right pleural effusion concerning for malignant effusion. Consider sampling. CT GUIDED CHEST TUBE    Result Date: 4/19/2022  PROCEDURE: CT GUIDED CHEST TUBE PLACEMENT MODERATE CONSCIOUS SEDATION 4/19/2022 HISTORY: ORDERING SYSTEM PROVIDED HISTORY: pleural effusion TECHNOLOGIST PROVIDED HISTORY: Reason for exam:->pleural effusion Which side should the procedure be performed?->Right Reason for Exam: pleural effusion Pneumothorax SEDATION: No sedation was used. TECHNIQUE: Informed consent was obtained after a detailed explanation of the procedure including risks, benefits, and alternatives. Universal protocol was followed. A suitable skin site was prepped and draped in sterile fashion following CT localization.  An 18 gauge needle was advanced into the right pleural space and a 0.035 guidewire was used to place a 10 Monegasque chest tube after the fascial tract was dilated. The catheter was sutured to the skin and the patient tolerated the procedure well. The catheter was attached to Pleurevac drainage. Estimated blood loss: Less than 5 cc Dose modulation, iterative reconstruction, and/or weight based adjustment of the mA/kV was utilized to reduce the radiation dose to as low as reasonably achievable. DLP: 881.23 mGy-cm FINDINGS: Post procedure images demonstrate the chest tube in good position     Successful CT guided placement of a 10 Monegasque right chest tube     US THORACENTESIS Which side should the procedure be performed? Right    Result Date: 4/19/2022  PROCEDURE: ULTRASOUNDGUIDED RIGHT THORACENTESIS 4/19/2022 HISTORY: ORDERING SYSTEM PROVIDED HISTORY: pleural effusion TECHNOLOGIST PROVIDED HISTORY: Reason for exam:->pleural effusion Which side should the procedure be performed?->Right TECHNIQUE: Informed consent was obtained after a detailed explanation of the procedure including risks, benefits, and alternatives. Universal protocol was performed. The right chest was prepped and draped in sterile fashion and local anesthesia was achieved with lidocaine. A 5 Monegasque needle sheath was advanced under ultrasound guidance into pleural effusion and thoracentesis was performed. The patient tolerated the procedure Estimated blood loss: Less than 5 cc FINDINGS: A total of 800 cc dark red fluid was removed and sent for analysis. Successful ultrasound guided right thoracentesis.         Results for orders placed or performed during the hospital encounter of 04/18/22   Culture, Body Fluid    Specimen: Pleural Fluid   Result Value Ref Range    Body Fluid Culture, Sterile No growth to date  No growth 36 to 48 hours       Gram Stain Result No WBCs or organisms seen    CBC with Auto Differential   Result Value Ref Range    WBC 5.9 4.0 - 11.0 K/uL    RBC 2.68 (L) 4.20 - 5.90 M/uL Hemoglobin 9.6 (L) 13.5 - 17.5 g/dL    Hematocrit 28.8 (L) 40.5 - 52.5 %    .6 (H) 80.0 - 100.0 fL    MCH 35.8 (H) 26.0 - 34.0 pg    MCHC 33.3 31.0 - 36.0 g/dL    RDW 17.7 (H) 12.4 - 15.4 %    Platelets 062 484 - 537 K/uL    MPV 7.7 5.0 - 10.5 fL    PLATELET SLIDE REVIEW Adequate     SLIDE REVIEW see below     Neutrophils % 78.0 %    Lymphocytes % 6.3 %    Monocytes % 14.3 %    Eosinophils % 0.4 %    Basophils % 1.0 %    Neutrophils Absolute 4.6 1.7 - 7.7 K/uL    Lymphocytes Absolute 0.4 (L) 1.0 - 5.1 K/uL    Monocytes Absolute 0.8 0.0 - 1.3 K/uL    Eosinophils Absolute 0.0 0.0 - 0.6 K/uL    Basophils Absolute 0.1 0.0 - 0.2 K/uL    Anisocytosis 1+ (A)     Macrocytes 1+ (A)    Comprehensive Metabolic Panel w/ Reflex to MG   Result Value Ref Range    Sodium 131 (L) 136 - 145 mmol/L    Potassium reflex Magnesium 3.1 (L) 3.5 - 5.1 mmol/L    Chloride 91 (L) 99 - 110 mmol/L    CO2 16 (L) 21 - 32 mmol/L    Anion Gap 24 (H) 3 - 16    Glucose 160 (H) 70 - 99 mg/dL    BUN 76 (H) 7 - 20 mg/dL    CREATININE 11.5 (HH) 0.8 - 1.3 mg/dL    GFR Non-African American 4 (A) >60    GFR  5 (A) >60    Calcium 7.6 (L) 8.3 - 10.6 mg/dL    Total Protein 6.8 6.4 - 8.2 g/dL    Albumin 2.8 (L) 3.4 - 5.0 g/dL    Albumin/Globulin Ratio 0.7 (L) 1.1 - 2.2    Total Bilirubin <0.2 0.0 - 1.0 mg/dL    Alkaline Phosphatase 106 40 - 129 U/L    ALT 8 (L) 10 - 40 U/L    AST 21 15 - 37 U/L   Magnesium   Result Value Ref Range    Magnesium 2.40 1.80 - 2.40 mg/dL   Troponin   Result Value Ref Range    Troponin 0.11 (H) <0.01 ng/mL   Hemoglobin A1c   Result Value Ref Range    Hemoglobin A1C 5.3 See comment %    eAG 105.4 mg/dL   Troponin   Result Value Ref Range    Troponin 0.11 (H) <0.01 ng/mL   Troponin   Result Value Ref Range    Troponin 0.11 (H) <0.01 ng/mL   Basic Metabolic Panel w/ Reflex to MG   Result Value Ref Range    Sodium 137 136 - 145 mmol/L    Potassium reflex Magnesium 3.3 (L) 3.5 - 5.1 mmol/L    Chloride 94 (L) 99 - 110 mmol/L    CO2 16 (L) 21 - 32 mmol/L    Anion Gap 27 (H) 3 - 16    Glucose 196 (H) 70 - 99 mg/dL    BUN 80 (H) 7 - 20 mg/dL    CREATININE 11.6 (HH) 0.8 - 1.3 mg/dL    GFR Non-African American 4 (A) >60    GFR  5 (A) >60    Calcium 7.5 (L) 8.3 - 10.6 mg/dL   Hepatic function panel   Result Value Ref Range    Total Protein 6.6 6.4 - 8.2 g/dL    Albumin 2.9 (L) 3.4 - 5.0 g/dL    Alkaline Phosphatase 106 40 - 129 U/L    ALT 9 (L) 10 - 40 U/L    AST 23 15 - 37 U/L    Total Bilirubin <0.2 0.0 - 1.0 mg/dL    Bilirubin, Direct <0.2 0.0 - 0.3 mg/dL    Bilirubin, Indirect see below 0.0 - 1.0 mg/dL   CBC with Auto Differential   Result Value Ref Range    WBC 6.0 4.0 - 11.0 K/uL    RBC 2.59 (L) 4.20 - 5.90 M/uL    Hemoglobin 9.2 (L) 13.5 - 17.5 g/dL    Hematocrit 28.3 (L) 40.5 - 52.5 %    .5 (H) 80.0 - 100.0 fL    MCH 35.4 (H) 26.0 - 34.0 pg    MCHC 32.4 31.0 - 36.0 g/dL    RDW 18.1 (H) 12.4 - 15.4 %    Platelets 686 210 - 598 K/uL    MPV 7.2 5.0 - 10.5 fL    Neutrophils % 83.3 %    Lymphocytes % 4.4 %    Monocytes % 11.5 %    Eosinophils % 0.4 %    Basophils % 0.4 %    Neutrophils Absolute 5.0 1.7 - 7.7 K/uL    Lymphocytes Absolute 0.3 (L) 1.0 - 5.1 K/uL    Monocytes Absolute 0.7 0.0 - 1.3 K/uL    Eosinophils Absolute 0.0 0.0 - 0.6 K/uL    Basophils Absolute 0.0 0.0 - 0.2 K/uL   Phosphorus   Result Value Ref Range    Phosphorus 8.7 (H) 2.5 - 4.9 mg/dL   TSH with Reflex to FT4   Result Value Ref Range    TSH Reflex FT4 2.42 0.27 - 4.20 uIU/mL   Magnesium   Result Value Ref Range    Magnesium 2.50 (H) 1.80 - 2.40 mg/dL   Cell Count with Differential, Body Fluid   Result Value Ref Range    Cell Count Fluid Type Pleural     Color, Fluid Brown     Appearance, Fluid Turbid     Clot Eval. see below     Nucl Cell, Fluid 1,670 /cumm    RBC, Fluid 165,000 /cumm    Neutrophil Count, Fluid 62 %    Lymphocytes, Body Fluid 34 %    Monocyte Count, Fluid 4 %    Number of Cells Counted Fluid 100    Lactate Dehydrogenase, Body Fluid   Result Value Ref Range    LD, Fluid >2500 Not Established U/L    Fluid Type Pleural    Protein, Body Fluid   Result Value Ref Range    Protein, Fluid 5.4 Not Established g/dL   Protime-INR   Result Value Ref Range    Protime 11.0 9.9 - 12.7 sec    INR 0.97 0.88 - 1.12   Hemoglobin and Hematocrit   Result Value Ref Range    Hemoglobin 9.5 (L) 13.5 - 17.5 g/dL    Hematocrit 29.1 (L) 40.5 - 52.5 %   Renal Function Panel   Result Value Ref Range    Sodium 135 (L) 136 - 145 mmol/L    Potassium 3.1 (L) 3.5 - 5.1 mmol/L    Chloride 94 (L) 99 - 110 mmol/L    CO2 19 (L) 21 - 32 mmol/L    Anion Gap 22 (H) 3 - 16    Glucose 164 (H) 70 - 99 mg/dL    BUN 75 (H) 7 - 20 mg/dL    CREATININE 10.8 (HH) 0.8 - 1.3 mg/dL    GFR Non- 5 (A) >60    GFR  6 (A) >60    Calcium 7.6 (L) 8.3 - 10.6 mg/dL    Phosphorus 7.9 (H) 2.5 - 4.9 mg/dL    Albumin 2.8 (L) 3.4 - 5.0 g/dL   CBC   Result Value Ref Range    WBC 5.5 4.0 - 11.0 K/uL    RBC 2.61 (L) 4.20 - 5.90 M/uL    Hemoglobin 9.2 (L) 13.5 - 17.5 g/dL    Hematocrit 28.0 (L) 40.5 - 52.5 %    .5 (H) 80.0 - 100.0 fL    MCH 35.2 (H) 26.0 - 34.0 pg    MCHC 32.8 31.0 - 36.0 g/dL    RDW 18.4 (H) 12.4 - 15.4 %    Platelets 918 868 - 459 K/uL    MPV 7.6 5.0 - 10.5 fL   Renal Function Panel   Result Value Ref Range    Sodium 135 (L) 136 - 145 mmol/L    Potassium 3.6 3.5 - 5.1 mmol/L    Chloride 95 (L) 99 - 110 mmol/L    CO2 18 (L) 21 - 32 mmol/L    Anion Gap 22 (H) 3 - 16    Glucose 142 (H) 70 - 99 mg/dL    BUN 74 (H) 7 - 20 mg/dL    CREATININE 10.8 (HH) 0.8 - 1.3 mg/dL    GFR Non- 5 (A) >60    GFR  6 (A) >60    Calcium 7.4 (L) 8.3 - 10.6 mg/dL    Phosphorus 7.7 (H) 2.5 - 4.9 mg/dL    Albumin 2.5 (L) 3.4 - 5.0 g/dL   CBC with Auto Differential   Result Value Ref Range    WBC 6.4 4.0 - 11.0 K/uL    RBC 2.55 (L) 4.20 - 5.90 M/uL    Hemoglobin 9.0 (L) 13.5 - 17.5 g/dL    Hematocrit 27.6 (L) 40.5 - 52.5 %    .3 (H) 80.0 - 100.0 fL    MCH 35.2 (H) 26.0 - 34.0 pg    MCHC 32.5 31.0 - 36.0 g/dL    RDW 18.4 (H) 12.4 - 15.4 %    Platelets 954 003 - 219 K/uL    MPV 7.3 5.0 - 10.5 fL    Neutrophils % 74.6 %    Lymphocytes % 8.7 %    Monocytes % 14.2 %    Eosinophils % 1.3 %    Basophils % 1.2 %    Neutrophils Absolute 4.8 1.7 - 7.7 K/uL    Lymphocytes Absolute 0.6 (L) 1.0 - 5.1 K/uL    Monocytes Absolute 0.9 0.0 - 1.3 K/uL    Eosinophils Absolute 0.1 0.0 - 0.6 K/uL    Basophils Absolute 0.1 0.0 - 0.2 K/uL   POCT Glucose   Result Value Ref Range    POC Glucose 93 70 - 99 mg/dl    Performed on ACCU-CHEK    POCT Glucose   Result Value Ref Range    POC Glucose 141 (H) 70 - 99 mg/dl    Performed on ACCU-CHEK    POCT Glucose   Result Value Ref Range    POC Glucose 181 (H) 70 - 99 mg/dl    Performed on ACCU-CHEK    POCT Glucose   Result Value Ref Range    POC Glucose 133 (H) 70 - 99 mg/dl    Performed on ACCU-CHEK    POCT Glucose   Result Value Ref Range    POC Glucose 200 (H) 70 - 99 mg/dl    Performed on ACCU-CHEK    POCT Glucose   Result Value Ref Range    POC Glucose 139 (H) 70 - 99 mg/dl    Performed on ACCU-CHEK    POCT Glucose   Result Value Ref Range    POC Glucose 131 (H) 70 - 99 mg/dl    Performed on ACCU-CHEK    POCT Glucose   Result Value Ref Range    POC Glucose 244 (H) 70 - 99 mg/dl    Performed on ACCU-CHEK    POCT Glucose   Result Value Ref Range    POC Glucose 83 70 - 99 mg/dl    Performed on ACCU-CHEK    POCT Glucose   Result Value Ref Range    POC Glucose 190 (H) 70 - 99 mg/dl    Performed on ACCU-CHEK    POCT Glucose   Result Value Ref Range    POC Glucose 186 (H) 70 - 99 mg/dl    Performed on ACCU-CHEK    POCT Glucose   Result Value Ref Range    POC Glucose 161 (H) 70 - 99 mg/dl    Performed on ACCU-CHEK    EKG 12 Lead   Result Value Ref Range    Ventricular Rate 95 BPM    Atrial Rate 95 BPM    P-R Interval 162 ms    QRS Duration 88 ms    Q-T Interval 360 ms    QTc Calculation (Bazett) 452 ms    P Axis 63 degrees    R Axis 11 degrees    T Axis -58 degrees    Diagnosis       atrial flutter (see lead V1)  with occasional Premature ventricular complexesabnormal R wave progression Abnormal ECGWhen compared with ECG of 15-APR-2022 12:05,QT has lengthened  but still within normal limitsConfirmed by [facility], [address] [place] [address] ([id number]) on 4/18/2022 1:45:00 PM       Diet:  ADULT DIET; Regular; 4 carb choices (60 gm/meal);  Low Fat/Low Chol/High Fiber/ROME; Low Sodium (2 gm); 1500 ml  ADULT ORAL NUTRITION SUPPLEMENT; Breakfast, Dinner; Diabetic Oral Supplement    Activity:  Activity as tolerated (Patient may move about with assist as indicated or with supervision.)    Follow-up:  in the next few days with Kaye Peguero MD,  in the next few weeks with Cardiology    Disposition: home    Condition: Stable      Time Spent: 35 minutes    Electronically signed by Dusty Almaguer MD on 4/21/2022 at 2:09 PM    Discharging Hospitalist

## 2022-04-21 NOTE — PROGRESS NOTES
Pulmonary Progress Note    Date of Admission: 4/18/2022   LOS: 3 days     Chief Complaint   Patient presents with    Other     sent in by kidney doctor for possible issues, has multiple issues, decreased urination, sob when he lays down. Assessment/Plan:       Acute hypoxemic respiratory failure  -Resolved    Right-sided pleural effusion  Trapped lung  -Exudate, many lymphocytes suspicious for malignancy given lung mass  -Cytology pending  -Suspect persistent pneumothorax is trapped lung likely from malignant process  -We will await cytology if effusion recurs would be a candidate for palliative Pleurx if malignant  -If cytology is negative we will need to pursue other biopsy modalities      Right upper lobe lung mass  -Neoplasm until proven otherwise, will await cytology from pleural effusion before discussing further biopsy options    ESRD on PD     From pulmonary standpoint okay for discharge,.    24 Hour Events/Subjective  Chest tube removed yesterday persistent but stable pneumothorax likely trapped lung. Tolerating room air    ROS:   No nausea  No Vomiting  No chest pain      Intake/Output Summary (Last 24 hours) at 4/21/2022 1003  Last data filed at 4/20/2022 2210  Gross per 24 hour   Intake 320 ml   Output 85 ml   Net 235 ml         PHYSICAL EXAM:   Blood pressure 112/67, pulse 99, temperature 97.2 °F (36.2 °C), temperature source Oral, resp. rate 23, height 5' 11\" (1.803 m), weight 149 lb 0.5 oz (67.6 kg), SpO2 94 %.'  Gen:  No acute distress. Eyes: PERRL. Anicteric sclera. No conjunctival injection. ENT: No discharge. Posterior oropharynx clear. External appearance of ears and nose normal.  Neck: Trachea midline. No mass   Resp:  No crackles. No wheezes. No rhonchi. No dullness on percussion. CV: Regular rate. Regular rhythm. No murmur or rub. No edema. GI: Soft, Non-tender. Non-distended. +BS  Skin: Warm, dry, w/o erythema. Lymph: No cervical or supraclavicular LAD. M/S: No cyanosis.  No clubbing. Neuro:  no focal neurologic deficit. Moves all extremities  Psych: Awake and alert, Oriented x 3. Judgement and insight appropriate. Mood stable. Medications:    Scheduled Meds:   hydrALAZINE  10 mg Oral BID    potassium chloride  40 mEq Oral Daily with breakfast    metoprolol tartrate  25 mg Oral BID    dianeal lo-chris 1.5%  4,000 mL IntraPERitoneal Nightly    And    dianeal lo-chris 1.5%  6,000 mL IntraPERitoneal Nightly    allopurinol  100 mg Oral Daily    aspirin  325 mg Oral Daily    b complex-C-folic acid  1 capsule Oral Daily    calcium acetate  1,334 mg Oral TID WC    doxepin  10 mg Oral Nightly    furosemide  80 mg Oral BID    tamsulosin  0.4 mg Oral Nightly    Vitamin D  2,000 Units Oral Daily    insulin lispro  0-6 Units SubCUTAneous TID WC    insulin lispro  0-3 Units SubCUTAneous Nightly    sodium chloride flush  5-40 mL IntraVENous 2 times per day    heparin (porcine)  5,000 Units SubCUTAneous 3 times per day       Continuous Infusions:   dextrose      sodium chloride         PRN Meds:  glucose, dextrose, glucagon (rDNA), dextrose, sodium chloride flush, sodium chloride, ondansetron **OR** ondansetron, polyethylene glycol, acetaminophen **OR** acetaminophen, perflutren lipid microspheres    Labs reviewed:  CBC:   Recent Labs     04/19/22  0449 04/19/22 0449 04/19/22  1350 04/20/22  0444 04/21/22 0429   WBC 6.0  --   --  5.5 6.4   HGB 9.2*   < > 9.5* 9.2* 9.0*   HCT 28.3*   < > 29.1* 28.0* 27.6*   .5*  --   --  107.5* 108.3*     --   --  288 294    < > = values in this interval not displayed.      BMP:   Recent Labs     04/19/22  0449 04/20/22  0444 04/21/22 0429    135* 135*   K 3.3* 3.1* 3.6   CL 94* 94* 95*   CO2 16* 19* 18*   PHOS 8.7* 7.9* 7.7*   BUN 80* 75* 74*   CREATININE 11.6* 10.8* 10.8*     LIVER PROFILE:   Recent Labs     04/18/22  1118 04/19/22 0449   AST 21 23   ALT 8* 9*   BILIDIR  --  <0.2   BILITOT <0.2 <0.2   ALKPHOS 106 106 PT/INR:   Recent Labs     04/19/22  1007   PROTIME 11.0   INR 0.97     APTT: No results for input(s): APTT in the last 72 hours. UA:No results for input(s): NITRITE, COLORU, PHUR, LABCAST, WBCUA, RBCUA, MUCUS, TRICHOMONAS, YEAST, BACTERIA, CLARITYU, SPECGRAV, LEUKOCYTESUR, UROBILINOGEN, BILIRUBINUR, BLOODU, GLUCOSEU, AMORPHOUS in the last 72 hours. Invalid input(s): Beatriz Duran  No results for input(s): PH, PCO2, PO2 in the last 72 hours. Films:  Radiology Review:  Pertinent images / reports were reviewed as a part of this visit. XR CHEST PORTABLE  Narrative: EXAMINATION:  ONE XRAY VIEW OF THE CHEST    4/20/2022 2:36 pm    COMPARISON:  Radiograph taken earlier today    HISTORY:  ORDERING SYSTEM PROVIDED HISTORY: hypoxemia  TECHNOLOGIST PROVIDED HISTORY:  Reason for exam:->hypoxemia  Reason for Exam: on room air    FINDINGS:  Unchanged right apical pneumothorax measuring 13 mm. Right pleural catheter  noted inferiorly. Large right perihilar masslike consolidation is  unchanged. .  Left hilar prominence is unchanged. Ground-glass haziness in  the right lung. Normal cardiac size. Significant osteopenic changes and degenerative changes noted in the bony  structures. Impression: No significant change. Small right apical pneumothorax. Large right perihilar masslike consolidation is unchanged. Ground-glass  haziness right lung is unchanged  XR CHEST PORTABLE  Narrative: EXAMINATION:  ONE XRAY VIEW OF THE CHEST    4/20/2022 10:01 am    COMPARISON:  MELITA LOUISE, 04/18/2022    HISTORY:  ORDERING SYSTEM PROVIDED HISTORY: efffusion  TECHNOLOGIST PROVIDED HISTORY:  Reason for exam:->efffusion  Reason for Exam: right chest tube    FINDINGS:  There is a right basilar chest drain. The cardiac silhouette is stable. Mass-like consolidation in the right  perihilar region is again noted. There is additional peripheral airspace  disease in ground-glass opacity in the right lung.   There is a small right  apical pneumothorax, measuring 13 mm. Left lung is clear. Impression: Right basilar chest drain is noted. New, small right pneumothorax. Right pleural effusion is decreased. Right perihilar masslike consolidation, neoplasm versus round pneumonia. Additional alveolar and ground-glass opacities in the right lung, pulmonary  edema and atelectasis favored over pneumonia. This note was transcribed using [id number] [id number]. Please disregard any translational errors.     Thank you for this consult,    Albaro Hall MD  [facility], Critical Care, and Sleep Medicine

## 2022-04-21 NOTE — PROGRESS NOTES
[facility]  Phone: [phone]  Fax: [phone]  [email or web address]       We are following the patient for esrd  77 y/o AAM well known to me with h/o ESRD on CCPD seen by his PCP Dr Betty Fair today for increased SOB orthopnea weakness and decreased appetite  CR showed R pleural effusion and R hilar fullness  CT  Scan in ER with contrast showed Dense R Upper lobe conslidation mediastinal adenopathy concerning for malignancy Complex R pleural effusion  Concerning for malignant ascites  He reports decreased appetite, wt loss and Decreased UOP recently  O2 sat noted to be 83%placed on O2 Cr noted to be 12 mg  SUBJECTIVE:  Underwent CT guided R CT placement   CT removed yesterday   Breathing better  Not requiring O2  Appetite remains poor    No issues with cycler overnight        OBJECTIVE:     PHYSICAL:    TEMPERATURE:  Current - Temp: 97.8 °F (36.6 °C);  Max - Temp  Av.8 °F (36.6 °C)  Min: 97.2 °F (36.2 °C)  Max: 98.3 °F (36.8 °C)  RESPIRATIONS RANGE: Resp  Av  Min: 13  Max: 23  PULSE RANGE: Pulse  Av.3  Min: 76  Max: 99  BLOOD PRESSURE RANGE:  Systolic (34EOB), XSH:610 , Min:91 , KAB:270   ; Diastolic (65DYD), DNX:07, Min:49, Max:67    PULSE OXIMETRY RANGE: SpO2  Av.6 %  Min: 94 %  Max: 98 %  24HR INTAKE/OUTPUT:      Intake/Output Summary (Last 24 hours) at 2022 1409  Last data filed at 2022 1055  Gross per 24 hour   Intake 600 ml   Output 85 ml   Net 515 ml       CONSTITUTIONAL:  Ill appearing NAD  HEENT:  Lids and lashes normal, pupils equal, round and reactive to light  NECK:  Supple, symmetrical, trachea midline, no adenopathy  LUNGS:  CLEAR ANTERIORLY  CARDIOVASCULAR:  Normal apical impulse, regular rate and rhythm, normal S1 and S2  ABDOMEN:  No scars, normal bowel sounds, soft, non-distended PD catheter in place  NEUROLOGIC:  alert, oriented, normal speech, no focal findings or movement disorder noted  SKIN: no bruising or bleeding  EXTREMITIES: no

## 2022-04-22 LAB
BODY FLUID CULTURE, STERILE: NORMAL
GRAM STAIN RESULT: NORMAL

## 2022-04-25 DIAGNOSIS — C34.11 MALIGNANT NEOPLASM OF UPPER LOBE OF RIGHT LUNG (HCC): Primary | ICD-10-CM

## 2022-05-06 ENCOUNTER — APPOINTMENT (OUTPATIENT)
Dept: CT IMAGING | Age: 82
DRG: 180 | End: 2022-05-06
Payer: MEDICARE

## 2022-05-06 ENCOUNTER — HOSPITAL ENCOUNTER (INPATIENT)
Age: 82
LOS: 5 days | Discharge: HOSPICE | DRG: 180 | End: 2022-05-11
Attending: EMERGENCY MEDICINE | Admitting: STUDENT IN AN ORGANIZED HEALTH CARE EDUCATION/TRAINING PROGRAM
Payer: MEDICARE

## 2022-05-06 DIAGNOSIS — C80.1 MALIGNANCY (HCC): Primary | ICD-10-CM

## 2022-05-06 DIAGNOSIS — R09.02 HYPOXIA: ICD-10-CM

## 2022-05-06 DIAGNOSIS — J18.9 PNEUMONIA DUE TO INFECTIOUS ORGANISM, UNSPECIFIED LATERALITY, UNSPECIFIED PART OF LUNG: ICD-10-CM

## 2022-05-06 PROBLEM — N18.6 ESRD (END STAGE RENAL DISEASE) (HCC): Status: ACTIVE | Noted: 2022-05-06

## 2022-05-06 PROBLEM — E43 SEVERE MALNUTRITION (HCC): Chronic | Status: ACTIVE | Noted: 2022-05-06

## 2022-05-06 PROBLEM — N19 UREMIA: Status: ACTIVE | Noted: 2022-05-06

## 2022-05-06 PROBLEM — E11.9 DM2 (DIABETES MELLITUS, TYPE 2) (HCC): Status: ACTIVE | Noted: 2022-05-06

## 2022-05-06 PROBLEM — A41.9 SEPSIS (HCC): Status: ACTIVE | Noted: 2022-05-06

## 2022-05-06 LAB
ACETAMINOPHEN LEVEL: <5 UG/ML (ref 10–30)
ALBUMIN SERPL-MCNC: 2.1 G/DL (ref 3.4–5)
ALP BLD-CCNC: 101 U/L (ref 40–129)
ALT SERPL-CCNC: 10 U/L (ref 10–40)
AMMONIA: 24 UMOL/L (ref 16–60)
ANION GAP SERPL CALCULATED.3IONS-SCNC: 27 MMOL/L (ref 3–16)
AST SERPL-CCNC: 27 U/L (ref 15–37)
BASOPHILS ABSOLUTE: 0 K/UL (ref 0–0.2)
BASOPHILS RELATIVE PERCENT: 0.5 %
BILIRUB SERPL-MCNC: <0.2 MG/DL (ref 0–1)
BILIRUBIN DIRECT: <0.2 MG/DL (ref 0–0.3)
BILIRUBIN, INDIRECT: ABNORMAL MG/DL (ref 0–1)
BUN BLDV-MCNC: 101 MG/DL (ref 7–20)
CALCIUM SERPL-MCNC: 6.8 MG/DL (ref 8.3–10.6)
CHLORIDE BLD-SCNC: 90 MMOL/L (ref 99–110)
CO2: 15 MMOL/L (ref 21–32)
CREAT SERPL-MCNC: 13.1 MG/DL (ref 0.8–1.3)
EOSINOPHILS ABSOLUTE: 0 K/UL (ref 0–0.6)
EOSINOPHILS RELATIVE PERCENT: 0.3 %
GFR AFRICAN AMERICAN: 4
GFR NON-AFRICAN AMERICAN: 4
GLUCOSE BLD-MCNC: 144 MG/DL (ref 70–99)
GLUCOSE BLD-MCNC: 164 MG/DL (ref 70–99)
GLUCOSE BLD-MCNC: 170 MG/DL (ref 70–99)
GLUCOSE BLD-MCNC: 175 MG/DL (ref 70–99)
GLUCOSE BLD-MCNC: 197 MG/DL (ref 70–99)
HCT VFR BLD CALC: 22.1 % (ref 40.5–52.5)
HEMOGLOBIN: 7.2 G/DL (ref 13.5–17.5)
LACTIC ACID: 1.2 MMOL/L (ref 0.4–2)
LIPASE: 51 U/L (ref 13–60)
LYMPHOCYTES ABSOLUTE: 0.4 K/UL (ref 1–5.1)
LYMPHOCYTES RELATIVE PERCENT: 4.4 %
MCH RBC QN AUTO: 34.7 PG (ref 26–34)
MCHC RBC AUTO-ENTMCNC: 32.6 G/DL (ref 31–36)
MCV RBC AUTO: 106.4 FL (ref 80–100)
MONOCYTES ABSOLUTE: 0.7 K/UL (ref 0–1.3)
MONOCYTES RELATIVE PERCENT: 7.9 %
MRSA SCREEN RT-PCR: NORMAL
NEUTROPHILS ABSOLUTE: 7.8 K/UL (ref 1.7–7.7)
NEUTROPHILS RELATIVE PERCENT: 86.9 %
PDW BLD-RTO: 20 % (ref 12.4–15.4)
PERFORMED ON: ABNORMAL
PLATELET # BLD: 347 K/UL (ref 135–450)
PMV BLD AUTO: 8.1 FL (ref 5–10.5)
POTASSIUM REFLEX MAGNESIUM: 4.3 MMOL/L (ref 3.5–5.1)
PRO-BNP: ABNORMAL PG/ML (ref 0–449)
RAPID INFLUENZA  B AGN: NEGATIVE
RAPID INFLUENZA A AGN: NEGATIVE
RBC # BLD: 2.07 M/UL (ref 4.2–5.9)
REASON FOR REJECTION: NORMAL
REASON FOR REJECTION: NORMAL
REJECTED TEST: NORMAL
REJECTED TEST: NORMAL
SALICYLATE, SERUM: <0.3 MG/DL (ref 15–30)
SARS-COV-2, NAAT: NOT DETECTED
SODIUM BLD-SCNC: 132 MMOL/L (ref 136–145)
TOTAL CK: 68 U/L (ref 39–308)
TOTAL PROTEIN: 6.4 G/DL (ref 6.4–8.2)
TSH REFLEX: 3.5 UIU/ML (ref 0.27–4.2)
WBC # BLD: 9 K/UL (ref 4–11)

## 2022-05-06 PROCEDURE — 6370000000 HC RX 637 (ALT 250 FOR IP): Performed by: STUDENT IN AN ORGANIZED HEALTH CARE EDUCATION/TRAINING PROGRAM

## 2022-05-06 PROCEDURE — 96365 THER/PROPH/DIAG IV INF INIT: CPT

## 2022-05-06 PROCEDURE — 2060000000 HC ICU INTERMEDIATE R&B

## 2022-05-06 PROCEDURE — 2700000000 HC OXYGEN THERAPY PER DAY

## 2022-05-06 PROCEDURE — 80143 DRUG ASSAY ACETAMINOPHEN: CPT

## 2022-05-06 PROCEDURE — 2500000003 HC RX 250 WO HCPCS: Performed by: INTERNAL MEDICINE

## 2022-05-06 PROCEDURE — 2580000003 HC RX 258: Performed by: STUDENT IN AN ORGANIZED HEALTH CARE EDUCATION/TRAINING PROGRAM

## 2022-05-06 PROCEDURE — 99223 1ST HOSP IP/OBS HIGH 75: CPT | Performed by: INTERNAL MEDICINE

## 2022-05-06 PROCEDURE — 94761 N-INVAS EAR/PLS OXIMETRY MLT: CPT

## 2022-05-06 PROCEDURE — 96367 TX/PROPH/DG ADDL SEQ IV INF: CPT

## 2022-05-06 PROCEDURE — 2500000003 HC RX 250 WO HCPCS: Performed by: STUDENT IN AN ORGANIZED HEALTH CARE EDUCATION/TRAINING PROGRAM

## 2022-05-06 PROCEDURE — 2580000003 HC RX 258: Performed by: EMERGENCY MEDICINE

## 2022-05-06 PROCEDURE — 82140 ASSAY OF AMMONIA: CPT

## 2022-05-06 PROCEDURE — 2580000003 HC RX 258: Performed by: INTERNAL MEDICINE

## 2022-05-06 PROCEDURE — 6360000002 HC RX W HCPCS: Performed by: STUDENT IN AN ORGANIZED HEALTH CARE EDUCATION/TRAINING PROGRAM

## 2022-05-06 PROCEDURE — 83880 ASSAY OF NATRIURETIC PEPTIDE: CPT

## 2022-05-06 PROCEDURE — 87641 MR-STAPH DNA AMP PROBE: CPT

## 2022-05-06 PROCEDURE — 85025 COMPLETE CBC W/AUTO DIFF WBC: CPT

## 2022-05-06 PROCEDURE — 84443 ASSAY THYROID STIM HORMONE: CPT

## 2022-05-06 PROCEDURE — 74176 CT ABD & PELVIS W/O CONTRAST: CPT

## 2022-05-06 PROCEDURE — 6360000002 HC RX W HCPCS: Performed by: EMERGENCY MEDICINE

## 2022-05-06 PROCEDURE — 80048 BASIC METABOLIC PNL TOTAL CA: CPT

## 2022-05-06 PROCEDURE — 90945 DIALYSIS ONE EVALUATION: CPT

## 2022-05-06 PROCEDURE — 6370000000 HC RX 637 (ALT 250 FOR IP): Performed by: INTERNAL MEDICINE

## 2022-05-06 PROCEDURE — 80076 HEPATIC FUNCTION PANEL: CPT

## 2022-05-06 PROCEDURE — 36415 COLL VENOUS BLD VENIPUNCTURE: CPT

## 2022-05-06 PROCEDURE — 99285 EMERGENCY DEPT VISIT HI MDM: CPT

## 2022-05-06 PROCEDURE — 82550 ASSAY OF CK (CPK): CPT

## 2022-05-06 PROCEDURE — 83605 ASSAY OF LACTIC ACID: CPT

## 2022-05-06 PROCEDURE — 70450 CT HEAD/BRAIN W/O DYE: CPT

## 2022-05-06 PROCEDURE — A4722 DIALYS SOL FLD VOL > 1999CC: HCPCS | Performed by: INTERNAL MEDICINE

## 2022-05-06 PROCEDURE — 87635 SARS-COV-2 COVID-19 AMP PRB: CPT

## 2022-05-06 PROCEDURE — 80179 DRUG ASSAY SALICYLATE: CPT

## 2022-05-06 PROCEDURE — 87804 INFLUENZA ASSAY W/OPTIC: CPT

## 2022-05-06 PROCEDURE — 83690 ASSAY OF LIPASE: CPT

## 2022-05-06 RX ORDER — SODIUM CHLORIDE, SODIUM LACTATE, CALCIUM CHLORIDE, MAGNESIUM CHLORIDE AND DEXTROSE 2.5; 538; 448; 18.3; 5.08 G/100ML; MG/100ML; MG/100ML; MG/100ML; MG/100ML
3000 INJECTION, SOLUTION INTRAPERITONEAL NIGHTLY
Status: DISCONTINUED | OUTPATIENT
Start: 2022-05-06 | End: 2022-05-11 | Stop reason: HOSPADM

## 2022-05-06 RX ORDER — TAMSULOSIN HYDROCHLORIDE 0.4 MG/1
0.4 CAPSULE ORAL NIGHTLY
Status: DISCONTINUED | OUTPATIENT
Start: 2022-05-06 | End: 2022-05-11 | Stop reason: HOSPADM

## 2022-05-06 RX ORDER — INSULIN LISPRO 100 [IU]/ML
0-6 INJECTION, SOLUTION INTRAVENOUS; SUBCUTANEOUS
Status: DISCONTINUED | OUTPATIENT
Start: 2022-05-06 | End: 2022-05-11 | Stop reason: HOSPADM

## 2022-05-06 RX ORDER — CALCIUM ACETATE 667 MG/1
2 CAPSULE ORAL
Status: DISCONTINUED | OUTPATIENT
Start: 2022-05-06 | End: 2022-05-08

## 2022-05-06 RX ORDER — METRONIDAZOLE 500 MG/100ML
500 INJECTION, SOLUTION INTRAVENOUS EVERY 8 HOURS
Status: DISCONTINUED | OUTPATIENT
Start: 2022-05-06 | End: 2022-05-08

## 2022-05-06 RX ORDER — INSULIN LISPRO 100 [IU]/ML
0-6 INJECTION, SOLUTION INTRAVENOUS; SUBCUTANEOUS EVERY 4 HOURS
Status: CANCELLED | OUTPATIENT
Start: 2022-05-06

## 2022-05-06 RX ORDER — GENTAMICIN SULFATE 1 MG/G
CREAM TOPICAL DAILY
Status: DISCONTINUED | OUTPATIENT
Start: 2022-05-06 | End: 2022-05-11 | Stop reason: HOSPADM

## 2022-05-06 RX ORDER — TRAMADOL HYDROCHLORIDE 50 MG/1
25 TABLET ORAL 2 TIMES DAILY PRN
Status: ON HOLD | COMMUNITY
Start: 2022-05-03 | End: 2022-05-10 | Stop reason: HOSPADM

## 2022-05-06 RX ORDER — CALCIUM GLUCONATE 20 MG/ML
1000 INJECTION, SOLUTION INTRAVENOUS ONCE
Status: COMPLETED | OUTPATIENT
Start: 2022-05-06 | End: 2022-05-06

## 2022-05-06 RX ORDER — SODIUM CHLORIDE 9 MG/ML
INJECTION, SOLUTION INTRAVENOUS PRN
Status: DISCONTINUED | OUTPATIENT
Start: 2022-05-06 | End: 2022-05-11 | Stop reason: HOSPADM

## 2022-05-06 RX ORDER — INSULIN LISPRO 100 [IU]/ML
0-3 INJECTION, SOLUTION INTRAVENOUS; SUBCUTANEOUS NIGHTLY
Status: DISCONTINUED | OUTPATIENT
Start: 2022-05-06 | End: 2022-05-11 | Stop reason: HOSPADM

## 2022-05-06 RX ORDER — DEXTROSE MONOHYDRATE 25 G/50ML
12.5 INJECTION, SOLUTION INTRAVENOUS PRN
Status: DISCONTINUED | OUTPATIENT
Start: 2022-05-06 | End: 2022-05-11 | Stop reason: HOSPADM

## 2022-05-06 RX ORDER — SODIUM CHLORIDE 0.9 % (FLUSH) 0.9 %
5-40 SYRINGE (ML) INJECTION PRN
Status: DISCONTINUED | OUTPATIENT
Start: 2022-05-06 | End: 2022-05-11 | Stop reason: HOSPADM

## 2022-05-06 RX ORDER — 0.9 % SODIUM CHLORIDE 0.9 %
500 INTRAVENOUS SOLUTION INTRAVENOUS ONCE
Status: COMPLETED | OUTPATIENT
Start: 2022-05-06 | End: 2022-05-06

## 2022-05-06 RX ORDER — METRONIDAZOLE 500 MG/100ML
500 INJECTION, SOLUTION INTRAVENOUS ONCE
Status: COMPLETED | OUTPATIENT
Start: 2022-05-06 | End: 2022-05-06

## 2022-05-06 RX ORDER — ACETAMINOPHEN 325 MG/1
650 TABLET ORAL EVERY 6 HOURS PRN
Status: DISCONTINUED | OUTPATIENT
Start: 2022-05-06 | End: 2022-05-11 | Stop reason: HOSPADM

## 2022-05-06 RX ORDER — SODIUM CHLORIDE, SODIUM LACTATE, CALCIUM CHLORIDE, MAGNESIUM CHLORIDE AND DEXTROSE 1.5; 538; 448; 18.3; 5.08 G/100ML; MG/100ML; MG/100ML; MG/100ML; MG/100ML
6000 INJECTION, SOLUTION INTRAPERITONEAL NIGHTLY
Status: DISCONTINUED | OUTPATIENT
Start: 2022-05-06 | End: 2022-05-11 | Stop reason: HOSPADM

## 2022-05-06 RX ORDER — DEXTROSE MONOHYDRATE 50 MG/ML
100 INJECTION, SOLUTION INTRAVENOUS PRN
Status: DISCONTINUED | OUTPATIENT
Start: 2022-05-06 | End: 2022-05-11 | Stop reason: HOSPADM

## 2022-05-06 RX ORDER — LORATADINE 10 MG/1
10 TABLET ORAL NIGHTLY
Status: ON HOLD | COMMUNITY
End: 2022-05-10 | Stop reason: HOSPADM

## 2022-05-06 RX ORDER — FUROSEMIDE 40 MG/1
80 TABLET ORAL 2 TIMES DAILY
Status: CANCELLED | OUTPATIENT
Start: 2022-05-06

## 2022-05-06 RX ORDER — DEXTROSE MONOHYDRATE 50 MG/ML
100 INJECTION, SOLUTION INTRAVENOUS PRN
Status: DISCONTINUED | OUTPATIENT
Start: 2022-05-06 | End: 2022-05-06 | Stop reason: SDUPTHER

## 2022-05-06 RX ORDER — DEXTROSE MONOHYDRATE 25 G/50ML
12.5 INJECTION, SOLUTION INTRAVENOUS PRN
Status: DISCONTINUED | OUTPATIENT
Start: 2022-05-06 | End: 2022-05-06 | Stop reason: SDUPTHER

## 2022-05-06 RX ORDER — DOXEPIN HYDROCHLORIDE 10 MG/1
10 CAPSULE ORAL NIGHTLY
Status: DISCONTINUED | OUTPATIENT
Start: 2022-05-06 | End: 2022-05-11 | Stop reason: HOSPADM

## 2022-05-06 RX ORDER — ACETAMINOPHEN 650 MG/1
650 SUPPOSITORY RECTAL EVERY 6 HOURS PRN
Status: DISCONTINUED | OUTPATIENT
Start: 2022-05-06 | End: 2022-05-11 | Stop reason: HOSPADM

## 2022-05-06 RX ORDER — ALLOPURINOL 100 MG/1
100 TABLET ORAL DAILY
Status: DISCONTINUED | OUTPATIENT
Start: 2022-05-06 | End: 2022-05-11 | Stop reason: HOSPADM

## 2022-05-06 RX ORDER — NICOTINE POLACRILEX 4 MG
15 LOZENGE BUCCAL PRN
Status: DISCONTINUED | OUTPATIENT
Start: 2022-05-06 | End: 2022-05-06 | Stop reason: SDUPTHER

## 2022-05-06 RX ORDER — HYDRALAZINE HYDROCHLORIDE 10 MG/1
10 TABLET, FILM COATED ORAL 2 TIMES DAILY
Status: DISCONTINUED | OUTPATIENT
Start: 2022-05-06 | End: 2022-05-06

## 2022-05-06 RX ORDER — HEPARIN SODIUM 5000 [USP'U]/ML
5000 INJECTION, SOLUTION INTRAVENOUS; SUBCUTANEOUS EVERY 8 HOURS SCHEDULED
Status: DISCONTINUED | OUTPATIENT
Start: 2022-05-06 | End: 2022-05-11 | Stop reason: HOSPADM

## 2022-05-06 RX ORDER — SODIUM BICARBONATE 650 MG/1
650 TABLET ORAL 2 TIMES DAILY
Status: DISCONTINUED | OUTPATIENT
Start: 2022-05-06 | End: 2022-05-07

## 2022-05-06 RX ORDER — SODIUM CHLORIDE 0.9 % (FLUSH) 0.9 %
5-40 SYRINGE (ML) INJECTION EVERY 12 HOURS SCHEDULED
Status: DISCONTINUED | OUTPATIENT
Start: 2022-05-06 | End: 2022-05-11 | Stop reason: HOSPADM

## 2022-05-06 RX ORDER — NICOTINE POLACRILEX 4 MG
15 LOZENGE BUCCAL PRN
Status: DISCONTINUED | OUTPATIENT
Start: 2022-05-06 | End: 2022-05-11 | Stop reason: HOSPADM

## 2022-05-06 RX ORDER — SODIUM CHLORIDE 9 MG/ML
INJECTION, SOLUTION INTRAVENOUS CONTINUOUS
Status: DISCONTINUED | OUTPATIENT
Start: 2022-05-06 | End: 2022-05-06

## 2022-05-06 RX ORDER — METOPROLOL TARTRATE 5 MG/5ML
2.5 INJECTION INTRAVENOUS EVERY 6 HOURS PRN
Status: DISCONTINUED | OUTPATIENT
Start: 2022-05-06 | End: 2022-05-06

## 2022-05-06 RX ADMIN — VANCOMYCIN HYDROCHLORIDE 1000 MG: 1 INJECTION, POWDER, LYOPHILIZED, FOR SOLUTION INTRAVENOUS at 03:55

## 2022-05-06 RX ADMIN — SODIUM CHLORIDE: 9 INJECTION, SOLUTION INTRAVENOUS at 06:38

## 2022-05-06 RX ADMIN — CEFEPIME HYDROCHLORIDE 2000 MG: 2 INJECTION, POWDER, FOR SOLUTION INTRAVENOUS at 02:56

## 2022-05-06 RX ADMIN — SODIUM CHLORIDE 500 ML: 9 INJECTION, SOLUTION INTRAVENOUS at 05:11

## 2022-05-06 RX ADMIN — METRONIDAZOLE 500 MG: 500 INJECTION, SOLUTION INTRAVENOUS at 14:38

## 2022-05-06 RX ADMIN — SODIUM CHLORIDE, SODIUM LACTATE, CALCIUM CHLORIDE, MAGNESIUM CHLORIDE AND DEXTROSE 3000 ML: 2.5; 538; 448; 18.3; 5.08 INJECTION, SOLUTION INTRAPERITONEAL at 22:00

## 2022-05-06 RX ADMIN — METOPROLOL TARTRATE 25 MG: 25 TABLET, FILM COATED ORAL at 22:14

## 2022-05-06 RX ADMIN — SODIUM CHLORIDE, SODIUM LACTATE, CALCIUM CHLORIDE, MAGNESIUM CHLORIDE AND DEXTROSE 6000 ML: 1.5; 538; 448; 18.3; 5.08 INJECTION, SOLUTION INTRAPERITONEAL at 22:00

## 2022-05-06 RX ADMIN — TAMSULOSIN HYDROCHLORIDE 0.4 MG: 0.4 CAPSULE ORAL at 22:14

## 2022-05-06 RX ADMIN — GENTAMICIN SULFATE: 1 CREAM TOPICAL at 22:14

## 2022-05-06 RX ADMIN — SODIUM BICARBONATE 650 MG: 650 TABLET ORAL at 22:14

## 2022-05-06 RX ADMIN — DOXEPIN HYDROCHLORIDE 10 MG: 10 CAPSULE ORAL at 22:14

## 2022-05-06 RX ADMIN — HEPARIN SODIUM 5000 UNITS: 5000 INJECTION INTRAVENOUS; SUBCUTANEOUS at 06:31

## 2022-05-06 RX ADMIN — METRONIDAZOLE 500 MG: 500 INJECTION, SOLUTION INTRAVENOUS at 05:13

## 2022-05-06 RX ADMIN — INSULIN LISPRO 1 UNITS: 100 INJECTION, SOLUTION INTRAVENOUS; SUBCUTANEOUS at 12:26

## 2022-05-06 RX ADMIN — INSULIN LISPRO 1 UNITS: 100 INJECTION, SOLUTION INTRAVENOUS; SUBCUTANEOUS at 18:04

## 2022-05-06 RX ADMIN — ALLOPURINOL 100 MG: 100 TABLET ORAL at 09:07

## 2022-05-06 RX ADMIN — METOPROLOL TARTRATE 25 MG: 25 TABLET, FILM COATED ORAL at 09:07

## 2022-05-06 RX ADMIN — CALCIUM GLUCONATE 1000 MG: 20 INJECTION, SOLUTION INTRAVENOUS at 06:31

## 2022-05-06 RX ADMIN — INSULIN LISPRO 1 UNITS: 100 INJECTION, SOLUTION INTRAVENOUS; SUBCUTANEOUS at 09:08

## 2022-05-06 RX ADMIN — CALCIUM ACETATE 1334 MG: 667 CAPSULE ORAL at 18:04

## 2022-05-06 RX ADMIN — HEPARIN SODIUM 5000 UNITS: 5000 INJECTION INTRAVENOUS; SUBCUTANEOUS at 22:14

## 2022-05-06 RX ADMIN — HEPARIN SODIUM 5000 UNITS: 5000 INJECTION INTRAVENOUS; SUBCUTANEOUS at 14:38

## 2022-05-06 RX ADMIN — INSULIN LISPRO 1 UNITS: 100 INJECTION, SOLUTION INTRAVENOUS; SUBCUTANEOUS at 22:15

## 2022-05-06 RX ADMIN — METRONIDAZOLE 500 MG: 500 INJECTION, SOLUTION INTRAVENOUS at 22:24

## 2022-05-06 RX ADMIN — Medication 10 ML: at 22:18

## 2022-05-06 RX ADMIN — ASPIRIN 325 MG: 325 TABLET, COATED ORAL at 09:07

## 2022-05-06 ASSESSMENT — ENCOUNTER SYMPTOMS
SHORTNESS OF BREATH: 1
GASTROINTESTINAL NEGATIVE: 1
EYES NEGATIVE: 1

## 2022-05-06 ASSESSMENT — PAIN - FUNCTIONAL ASSESSMENT: PAIN_FUNCTIONAL_ASSESSMENT: NONE - DENIES PAIN

## 2022-05-06 ASSESSMENT — PAIN SCALES - GENERAL
PAINLEVEL_OUTOF10: 0

## 2022-05-06 NOTE — H&P
Hospital Medicine History & Physical      PCP: Ruby Arguelles MD    Date of Admission: 5/6/2022    Date of Service: Pt seen/examined on 5/6/2022 and admitted to inpatient    Chief Complaint: Altered mental status, fatigue, shortness of breath on exertion, chills      History Of Present Illness: The patient is a 78 y.o. male with past history of ESRD on peritoneal dialysis, hypertension, and more recently since last month was diagnosed with significant right upper lobe lung cancer and had pleural effusion that had chest tube at the time and was sent home about 3 weeks ago who presents to [facility] via EMS coming from home, no family at bedside, but apparently patient was sent here for worsening altered mental status and apparently fatigue which patient at this time claims was for the last week at least.  Patient is now more alert and oriented but according to nursing patient was more lethargic and not answering questions appropriately. Seems to be improving with treatment. From what patient recalls, he does recall recently being admitted here for effusions but does not remember the chest tubes that were in place but noted that he was diagnosed with lung cancer and was sent home. He notes that for the last week he has been increasingly short of breath on exertion similar to previous and he has also noted some intermittent chills as well as decreased appetite but he notes that he has been consistent and always doing his peritoneal dialysis. He denies any other recent symptoms of chest pain, fever, chills, dizziness, syncope, dysuria, blood in urine/stool/sputum, rashes. Although patient seems to be more alert and oriented, difficult to trust him as a historian and no bedside family is available for further information or confirmation of story.     Past Medical History:        Diagnosis Date    Anemia in stage 4 chronic kidney disease ([address] 9/6/2019    Chronic kidney disease (CKD)     [facility] 9[facility]6/2019    Diabetic nephropathy ([facility]     Hypertension        Past Surgical History:        Procedure Laterality Date    COLONOSCOPY      CT GUIDED CHEST TUBE  4/19/2022    CT GUIDED CHEST TUBE 4/19/2022 [facility] CT    JOINT REPLACEMENT         Medications Prior to Admission:    Prior to Admission medications    Medication Sig Start Date End Date Taking? Authorizing Provider   hydrALAZINE (APRESOLINE) 10 MG tablet Take 1 tablet by mouth 2 times daily 4/21/22   Anabel Lyles MD   metoprolol tartrate (LOPRESSOR) 25 MG tablet Take 1 tablet by mouth 2 times daily 4/21/22   Anabel Lyles MD   calcium acetate 667 MG TABS Take 2 capsules by mouth 3 times daily (with meals)     Historical Provider, MD kenney complex-C-folic acid (NEPHROCAPS) 1 MG capsule Take 1 capsule by mouth daily    Historical Provider, MD   vitamin D (CHOLECALCIFEROL) 25 MCG (1000 UT) TABS tablet Take 2,000 Units by mouth daily    Historical Provider, MD   doxepin (SINEQUAN) 10 MG capsule Take 10 mg by mouth nightly    Historical Provider, MD   aspirin 325 MG EC tablet Take 325 mg by mouth daily    Historical Provider, MD   furosemide (LASIX) 40 MG tablet Take 80 mg by mouth 2 times daily     Historical Provider, MD   tamsulosin (FLOMAX) 0.4 MG capsule Take 0.4 mg by mouth nightly    Historical Provider, MD   allopurinol (ZYLOPRIM) 100 MG tablet Take 100 mg by mouth daily    Historical Provider, MD   linagliptin (TRADJENTA) 5 MG tablet Take 5 mg by mouth daily    Historical Provider, MD       Allergies:  Advil [ibuprofen], Naproxen, Pcn [penicillins], and Simvastatin    Social History:  The patient currently lives home    TOBACCO:   reports that he has quit smoking. He has quit using smokeless tobacco.  ETOH:   reports previous alcohol use.       Family History:  Reviewed in detail and negative for DM, Early CAD, Cancer, CVA. Positive as follows:    History reviewed. No pertinent family history. REVIEW OF SYSTEMS:    as noted in the HPI. All other systems reviewed and negative. PHYSICAL EXAM:    /83   Pulse 103   Temp 97.8 °F (36.6 °C) (Oral)   Resp 16   Wt 165 lb 12.6 oz (75.2 kg)   SpO2 93%   BMI 23.12 kg/m²     General appearance: More alert and oriented, on nasal cannula oxygen, chronically ill-appearing  HEENT Normal cephalic, atraumatic without obvious deformity. Pupils equal, round, and reactive to light. Extra ocular muscles intact. Conjunctivae/corneas clear. Dry mucous membrane. Anicteric sclera  Neck: Supple, no JVD  Lungs: Significantly diminished breath sounds to the right lung fields and more crackles are noted posteriorly, left lung field is overall clear and aerating well  Heart: Irregularly irregular rhythm, seems rate controlled, is in a flutter, no murmurs otherwise  Abdomen: Peritoneal dialysis catheter in place, soft overall, nontender, nonstick and active bowel  Extremities: Trace bilateral lower extremity pitting edema symmetrically  Skin: No rashes  Neurologic: Grossly intact neurologically at this time is 5 out of 5 strength  Mental status: More alert and oriented and able to answer questions more appropriately  Capillary Refill: Acceptable  < 3 seconds  Peripheral Pulses: +3 Easily felt, not easily obliterated with pressure    CT chest abdomen and pelvis without contrast:  1. Large area of right upper lobe dense consolidation with subtle areas of   calcific density seen which may represent a central right upper lobe neoplasm   and postobstructive consolidation. 2. Similar appearing pathologic mediastinal lymphadenopathy with subtle   calcification noted as well.    3. Small right-sided pleural effusion status post partial evacuation from   recent chest tube placement with a small amount of gas remaining.  Loculated   appearing pleural effusion remains. 4. Patchy areas of consolidation and reticulonodular change seen within the   right lung suspicious for multifocal pneumonia. 5. There is a 17 mm pulmonary nodule in the right lower lobe concerning for   possible metastatic disease.  No left-sided pulmonary nodules are identified. 6. Prominent mucosal folds seen within a few loops of jejunum in the left   abdomen with some interloop fluid, which can be seen in the setting of   enteritis though the interloop fluid may be secondary to the indwelling   peritoneal dialysis catheter and small volume upper abdominal and pelvic   ascites. 7. Cholelithiasis without findings to suggest cholecystitis. 8. No definite metastatic disease seen in the abdomen or pelvis. CT head without contrast: No acute process      CBC   Recent Labs     05/06/22  0128   WBC 9.0   HGB 7.2*   HCT 22.1*         RENAL  Recent Labs     05/06/22  0336   *   K 4.3   CL 90*   CO2 15*   *   CREATININE 13.1*     LFT'S  Recent Labs     05/06/22 0336   AST 27   ALT 10   BILIDIR <0.2   BILITOT <0.2   ALKPHOS 101     COAG  No results for input(s): INR in the last 72 hours.   CARDIAC ENZYMES  Recent Labs     05/06/22  0336   CKTOTAL 68       U/A:  No results found for: NITRITE, COLORU, WBCUA, RBCUA, MUCUS, BACTERIA, CLARITYU, SPECGRAV, LEUKOCYTESUR, BLOODU, GLUCOSEU, AMORPHOUS    ABG  No results found for: RNI3XYD, BEART, [id number], PHART, THGBART, [facility] [place]        Active Hospital Problems    Diagnosis Date Noted    Pneumonia [J18.9] 05/06/2022     Priority: Medium    Malignancy ([address] [C80.1] 05/06/2022     Priority: Medium    Sepsis ([address] [A41.9] 05/06/2022     Priority: Medium    Uremia [N19] 05/06/2022     Priority: Medium    ESRD (end stage renal disease) ([address] [N18.6] 05/06/2022     Priority: Medium    DM2 (diabetes mellitus, type 2) ([facility] [E11.9] 05/06/2022     Priority: Medium   · Altered mental status  · Atrial flutter      PHYSICIANS CERTIFICATION:    I certify that Edwin Pena is expected to be hospitalized for greater than 2 midnights based on the following assessment and plan:      ASSESSMENT/PLAN:  · Sepsis  · Altered mental status  · Uremia  · Pneumonia  · Malignancy  · ESRD  · Type 2 diabetes  · Atrial flutter  · Anemia    Plan:  · Suspect patient has some component of altered mental status secondary due to uremia but this might likely be exacerbated from more recent symptoms suggestive of active pneumonia, patient does have previously diagnosed lung malignancy 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 Patient does also have atrial flutter although the rate seems to be controlled and he was never started anticoagulation from previous admission  · Start patient on broad-spectrum antibiotics vancomycin/cefepime/Flagyl  · IV fluid hydration with 500 cc bolus of normal saline and continuous maintenance fluid at 75/h x 12 hours for IV fluid hydration  · Pulmonology consult for pneumonia and lung malignancy  · Nephrology consult for ESRD  · Palliative care consult  · Restart patient's home medications  · Repeat labs daily  · Patient's mental status seems to be improved and seems to be more secondary due to uremia and possibly infectious process, continue to monitor for any signs of possible stroke as patient is in a flutter but is not on anticoagulation since it is high risk and since recent malignancy was just diagnosed with a flutter. DVT Prophylaxis: Heparin  Diet: No diet orders on file  Code Status: Prior  PT/OT Eval Status: Ambulatory    Dispo -pending clinical course       Luan Mosley DO    Thank you Giorgi Platt MD for the opportunity to be involved in this patient's care. If you have any questions or concerns please feel free to contact me at [phone].

## 2022-05-06 NOTE — ED NOTES
Patient presents to ED with increased fatigue and extremity weakness from home. Patient does appear confused at times, but knows who and where he is. Per squad report they state patient normally walks around but has not been able to for a few days. Patient does get PD dialysis, and last had a tx yesterday. VSS. He states he makes very little urine. PIV placed, and labs sent.       Sylvia Garcia RN  05/06/22 2234

## 2022-05-06 NOTE — ACP (ADVANCE CARE PLANNING)
Advance Care Planning     Advance Care Planning Activator (Inpatient)  Conversation Note      Date of ACP Conversation: 5/6/2022     Conversation Conducted with:  Healthcare Decision Maker: Next of Kin by law (only applies in absence of above) (name) spouse & son    ACP Activator: Anmol Richards RN    Health Care Decision Maker:     Current Designated Health Care Decision Maker:     Primary Decision Maker: Zhane Done - [phone]    Primary Decision Maker: Cathie Goodwin Spouse - [phone]    Care Preferences    Ventilation: \"If you were in your present state of health and suddenly became very ill and were unable to breathe on your own, what would your preference be about the use of a ventilator (breathing machine) if it were available to you? \"      Would the patient desire the use of ventilator (breathing machine)?: yes    \"If your health worsens and it becomes clear that your chance of recovery is unlikely, what would your preference be about the use of a ventilator (breathing machine) if it were available to you? \"     Would the patient desire the use of ventilator (breathing machine)?: Unsure      Resuscitation  \"CPR works best to restart the heart when there is a sudden event, like a heart attack, in someone who is otherwise healthy. Unfortunately, CPR does not typically restart the heart for people who have serious health conditions or who are very sick. \"    \"In the event your heart stopped as a result of an underlying serious health condition, would you want attempts to be made to restart your heart (answer \"yes\" for attempt to resuscitate) or would you prefer a natural death (answer \"no\" for do not attempt to resuscitate)? \" yes       [] Yes   [x] No   Educated Patient / Tab Rodríguez regarding differences between Advance Directives and portable DNR orders.     Length of ACP Conversation in minutes:  5 minutes    Conversation Outcomes:  [x] ACP discussion completed - reviewed prior ACP, confirmed no changes  [] Existing advance directive reviewed with patient; no changes to patient's previously recorded wishes  [] New Advance Directive completed  [] Portable Do Not Rescitate prepared for Provider review and signature  [] POLST/POST/MOLST/MOST prepared for Provider review and signature  Electronically signed by Soco Willis RN Case Management [phone] on 5/6/2022 at 2:16 PM

## 2022-05-06 NOTE — PLAN OF CARE
Problem: Discharge Planning  Goal: Discharge to home or other facility with appropriate resources  Outcome: Progressing  SW involved in d/c planning as needed      Problem: Skin/Tissue Integrity  Goal: Absence of new skin breakdown  Description: 1. Monitor for areas of redness and/or skin breakdown  2. Assess vascular access sites hourly  3. Every 4-6 hours minimum:  Change oxygen saturation probe site  4. Every 4-6 hours:  If on nasal continuous positive airway pressure, respiratory therapy assess nares and determine need for appliance change or resting period. Outcome: Progressing   Skin assessment completed every shift per protocol. Pt assessed for incontinence, appropriate barrier cream applied as needed. Pt encouraged to turn/rotate every 2 hours. Assistance provided if pt unable to do so themselves. Will continue to monitor. Problem: Safety - Adult  Goal: Free from fall injury  Outcome: Progressing   Up with a stedy     Problem: ABCDS Injury Assessment  Goal: Absence of physical injury  Outcome: Progressing   A/O. ID band in place     Problem: Pain  Goal: Verbalizes/displays adequate comfort level or baseline comfort level  Outcome: Progressing  Assessing pain using appropriate pain rating scale q shift and PRN. Medicating pt as prescribed and indicated. Offering pt non-pharmacologic pain interventions. Reassessing pain and pts response to pain intervention.

## 2022-05-06 NOTE — CONSULTS
REASON FOR CONSULTATION/CC: Right lung cancer/pleural effusion      Consult at request of Nilson العراقي MD for    PCP: Wilson Cortes MD  Established Pulmonologist: Dr. Sherrie Batista: Ana Paula Garcia is a 78y.o. year old male with a history of    History of recurrent pleural effusions recently diagnosed with stage IV lung cancer status post thoracentesis with recurrent pleural effusions noted to be hypoxic with associated shortness of breath. This note may have been  transcribed using [id number] [employer]. Please disregard any translational errors. Assessment:        End-stage renal disease on peritoneal dialysis  Atrial flutter    Plan:      Hospital Day 0     Stage IV lung cancer with recurrent right pleural effusion  *Recurrent right pleural effusion. Status post thoracentesis April 2022. *Given quick recurrence, Pleurx catheter discussed    *Patient had persistent pneumothorax after last thoracentesis indicating a trapped lung. Acute hypoxemic  *Likely secondary to narrowing of the airways secondary to lung cancer, restrictive defect secondary to malignant pleural effusion. *Wean oxygen to 90% saturation      The family is meeting with Dr. Díaz Form discussing issues with dialysis. After he was completed, I then discussed with 1 family member present, 1 on the phone and 1 on a video conversation. Wife is included. We discussed the palliative treatment of Pleurx catheter and risk benefits. With this stated, they were okay to proceed with this. Order for Pleurx catheter. Likely be placed on Monday. Will follow up Sunday to confirm still ok for procedure. Placed NPO Sunday night. Will hold heparin Monday morning if planning procedure. This note was transcribed using [id number] [employer]roblast. Please disregard any translational errors.     Thank you for the consult    [facility]  [phone] Data: PAST MEDICAL HISTORY:  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 ([facility] [address] 9/6/2019    Chronic kidney disease (CKD)     Chronic kidney disease, stage IV (severe) ([facility] [address] 9/6/2019    Diabetic nephropathy ([facility] [place] [facility]     Hypertension        PAST SURGICAL HISTORY:  Past Surgical History:   Procedure Laterality Date    COLONOSCOPY      CT GUIDED CHEST TUBE  4/19/2022    CT GUIDED CHEST TUBE 4/19/2022 [facility] CT    JOINT REPLACEMENT         FAMILY HISTORY:  family history is not on file. SOCIAL HISTORY:   reports that he has quit smoking. He has quit using smokeless tobacco.    Scheduled Meds:   aspirin  325 mg Oral Daily    allopurinol  100 mg Oral Daily    doxepin  10 mg Oral Nightly    hydrALAZINE  10 mg Oral BID    metoprolol tartrate  25 mg Oral BID    tamsulosin  0.4 mg Oral Nightly    sodium chloride flush  5-40 mL IntraVENous 2 times per day    heparin (porcine)  5,000 Units SubCUTAneous 3 times per day    metroNIDAZOLE  500 mg IntraVENous Q8H    insulin lispro  0-6 Units SubCUTAneous TID WC    insulin lispro  0-3 Units SubCUTAneous Nightly    [START ON 5/7/2022] cefepime  1,000 mg IntraVENous Q24H    vancomycin (VANCOCIN) intermittent dosing (placeholder)   Other RX Placeholder       Continuous Infusions:   sodium chloride      dextrose      sodium chloride 75 mL/hr at 05/06/22 0638       PRN Meds:  sodium chloride flush, sodium chloride, acetaminophen **OR** acetaminophen, glucose, dextrose, glucagon (rDNA), dextrose    ALLERGIES:  Patient is allergic to advil [ibuprofen], naproxen, pcn [penicillins], and simvastatin.     REVIEW OF SYSTEMS:  Constitutional: Negative for fever   HENT: Negative for sore throat  Eyes: Negative for redness   Respiratory: + for dyspnea, +cough  Cardiovascular: Negative for chest pain  Gastrointestinal: Negative for vomiting, diarrhea    Genitourinary: Negative for hematuria   Musculoskeletal: Negative for contrast: No results found for this or any previous visit. CT Chest w/o contrast: Results for orders placed during the hospital encounter of 04/18/22    CT CHEST WO CONTRAST    Narrative  EXAMINATION:  CT OF THE CHEST WITHOUT CONTRAST 4/18/2022 12:12 pm    TECHNIQUE:  CT of the chest was performed without the administration of intravenous  contrast. Multiplanar reformatted images are provided for review. Dose  modulation, iterative reconstruction, and/or weight based adjustment of the  mA/kV was utilized to reduce the radiation dose to as low as reasonably  achievable. COMPARISON:  Recent x-ray    HISTORY:  ORDERING SYSTEM PROVIDED HISTORY: pleural effusion, new right infiltrate vs  mass? TECHNOLOGIST PROVIDED HISTORY:  Reason for exam:->pleural effusion, new right infiltrate vs mass? Decision Support Exception - unselect if not a suspected or confirmed  emergency medical condition->Emergency Medical Condition (MA)  Reason for Exam: pleural effusion, new right infiltrate vs mass    FINDINGS:  Mediastinum: Thyroid gland is unremarkable. There is abnormal  mediastinal-hilar adenopathy. An index right lower paratracheal-subcarinal  mass measures 3.5 cm x 3.3 cm. Some of the lymph nodes contain calcification  internally. Atherosclerotic change seen in aorta. Aortic annulus  calcification is seen. Severe coronary artery calcification is seen. Lungs/pleura: No focal consolidation is seen on the left. No obstructing  endobronchial lesions are seen on the left    On the right, moderate to large right-sided pleural effusion is seen. There  is adjacent consolidation at the right lung base. There is subtle nodularity  suggested along the pleura posteriorly on the right. Heterogeneous masslike consolidation is seen in the superomedial right upper  lobe, as well as the right middle lobe. A few air bronchograms are also seen.     Upper Abdomen: Right adrenal gland is normal.  Left adrenal gland is normal.  Punctate vascular calcifications are seen in the left kidney    Soft Tissues/Bones: Spurring is seen in the spine. Spurring is seen in the  shoulder joints. Impression  Heterogeneous consolidative change is seen in the right upper lobe and right  middle lobe, which could be due to underlying lung carcinoma given the marked  abnormal mediastinal adenopathy. Heterogeneous areas of nodularity along the  pleura posteriorly on the right raise the question of concomitant pleural  implants. RECOMMENDATIONS:  Unavailable      CTPA: Results for orders placed during the hospital encounter of 04/18/22    CT CHEST PULMONARY EMBOLISM W CONTRAST    Narrative  EXAMINATION:  CTA OF THE CHEST 4/18/2022 2:04 pm    TECHNIQUE:  CTA of the chest was performed after the administration of intravenous  contrast.  Multiplanar reformatted images are provided for review. MIP  images are provided for review. Dose modulation, iterative reconstruction,  and/or weight based adjustment of the mA/kV was utilized to reduce the  radiation dose to as low as reasonably achievable. COMPARISON:  CT chest earlier today. HISTORY:  ORDERING SYSTEM PROVIDED HISTORY: ESRD on dialysis, new orthopnea, probable  lung cancer on ct wo, r/o pe  TECHNOLOGIST PROVIDED HISTORY:  Reason for exam:->ESRD on dialysis, new orthopnea, probable lung cancer on ct  wo, r/o pe  Decision Support Exception - unselect if not a suspected or confirmed  emergency medical condition->Emergency Medical Condition (MA)  Reason for Exam: ESRD on dialysis, new orthopnea, probable lung cancer on ct  wo, r/o pe    FINDINGS:  Pulmonary Arteries: Pulmonary arteries are adequately opacified for  evaluation. No evidence of intraluminal filling defect to suggest pulmonary  embolism. Enlarged main pulmonary artery at 3.7 cm. Mediastinum: The thoracic aorta is normal in caliber with calcified  atherosclerotic plaque.   The heart is mildly enlarged with coronary vascular  calcification and no pericardial effusion. The esophagus is unremarkable. There is redemonstration of mediastinal adenopathy as described earlier. The largest right paratracheal/precarinal node measures 2.6 x 3.9 cm. Lungs/pleura: Moderate to large right pleural effusion with extensive  atelectasis in the basal portions of the right lower lobe and right middle  lobe. The effusion is mildly complex in attenuation with questionable soft  tissue attenuation particularly in the upper portions of the fluid. Consolidative opacity in the right upper lobe anteriorly is again noted. There is patchy interstitial changes throughout the aerated right lung. The  left lung is clear. No left pleural effusion. The central airways are  patent. Upper Abdomen: Small quantity of upper abdominal ascites. The visualized  upper abdominal viscera are unremarkable. Soft Tissues/Bones: No acute osseous or soft tissue abnormality. Prominent  multilevel degenerative changes in the lower cervical spine with mild  degenerative change throughout the thoracic spine. Impression  1. No evidence of pulmonary embolic disease. 2. Redemonstration of dense right upper lobe consolidation anteriorly. Pathologic mediastinal adenopathy. The findings are concerning for  underlying malignancy. Consider pulmonary consultation with possible  sampling. 3. Complex right pleural effusion concerning for malignant effusion. Consider sampling. CXR PA/LAT: Results for orders placed during the hospital encounter of 04/11/22    XR CHEST (2 VW)    Narrative  EXAMINATION:  TWO XRAY VIEWS OF THE CHEST    4/11/2022 1:01 pm    COMPARISON:  09/01/2021    HISTORY:  ORDERING SYSTEM PROVIDED HISTORY: Dyspnea on exertion  TECHNOLOGIST PROVIDED HISTORY:  Reason for Exam: Dyspnea on exertion    FINDINGS:  There has been interval development of soft tissue fullness in the right  hilum along with a small right pleural effusion.   Segmental airspace disease  is present within the right lower and middle lobes. The left lung is clear. Heart size and vascularity are stable. Impression  Right hilar soft tissue fullness with increasing right parapneumonic  effusion. CT of the chest recommended to evaluate for underlying mass. CXR portable: Results for orders placed during the hospital encounter of 04/18/22    XR CHEST PORTABLE    Narrative  EXAMINATION:  ONE XRAY VIEW OF THE CHEST    4/21/2022 10:42 am    COMPARISON:  04/20/2022    HISTORY:  ORDERING SYSTEM PROVIDED HISTORY: pleural effusion  TECHNOLOGIST PROVIDED HISTORY:  Reason for exam:->pleural effusion  Reason for Exam: SOB    FINDINGS:  Heart size is normal.  Mediastinal contours are normal.  Pulmonary  vascularity is normal.  Atherosclerotic change seen in aorta. Small right apical pneumothorax is seen, similar to prior. Distance from  chest wall to pleural margin measures 9 mm    Focal mass right upper lobe appears similar. Right-sided chest tube no longer noted. There is increased right-sided  pleural effusion    No new findings noted on the left. Skin folds are seen inferiorly in the  left chest wall    Impression  Increased right-sided pleural effusion. Right-sided chest tube no longer  noted. A right apical pneumothorax is unchanged.   No change in right upper  lobe mass

## 2022-05-06 NOTE — PROGRESS NOTES
Comprehensive Nutrition Assessment    Type and Reason for Visit:  Positive Nutrition Screen    Nutrition Recommendations/Plan:   Continue Carb control, Low Na, Low K+, Low Phos diet with 1500 mL FR. If pt receives PD, remove renal diet restrictions. Start Glucerna TID. Malnutrition Assessment:  Malnutrition Status:  Severe malnutrition (05/06/22 1057)    Context:  Acute Illness     Findings of the 6 clinical characteristics of malnutrition:  Energy Intake:  50% or less of estimated energy requirements for 5 or more days  Weight Loss:  Unable to assess (fluid shifts)     Body Fat Loss: Moderate body fat loss Triceps   Muscle Mass Loss: Moderate muscle mass loss Temples (temporalis),Clavicles (pectoralis & deltoids)  Fluid Accumulation:  No significant fluid accumulation     Strength:  Not Performed    Nutrition Assessment:    +nutrition screen. Pt with PMH of ESRD on PD, HTN, ULL cancer who presented with AMS, fatigue, and decreased appetite. Spoke with family in room who stated pt has had poor appetite since cancer dx 3 weeks ago. He occasionally drinks 1 glucerna at home but doesn't usually do any more. Family noted that pt has been losing wt recently, unable to give exact numbers but noted pt with moderate fat and muscle wasting. Will start Glucerna TID and encourage oral intakes. Nutrition Related Findings:    No BM noted; Na 132 Wound Type: None       Current Nutrition Intake & Therapies:    Average Meal Intake: % (1 meal)   Additional calories from PD when running. Average Supplements Intake: None Ordered  ADULT DIET; Regular; 4 carb choices (60 gm/meal); Low Sodium (2 gm); Low Potassium (Less than 3000 mg/day); Low Phosphorus (Less than 1000 mg); 1500 ml    Anthropometric Measures:  Height: 5' 11\" (180.3 cm)  Ideal Body Weight (IBW): 172 lbs (78 kg)    Admission Body Weight: 165 lb (74.8 kg)  Current Body Weight: 165 lb (74.8 kg), 95.9 % IBW.  Weight Source: Not Specified  Current BMI (kg/m2): 23                          BMI Categories: Normal Weight (BMI 18.5-24. 9)    Estimated Daily Nutrient Needs:        Energy (kcal/day): 9315-3675 kcal (25-30 kcal/kg 75 kg CBW)     Protein (g/day): 105-150 gm (1.5-2 gm/kg 75 kg CBW)     Fluid (ml/day): per provider    Nutrition Diagnosis:   · Severe malnutrition related to inadequate protein-energy intake,catabolic illness as evidenced by poor intake prior to admission,weight loss,moderate muscle loss,moderate loss of subcutaneous fat      Nutrition Interventions:   Food and/or Nutrient Delivery: Continue Current Diet,Start Oral Nutrition Supplement  Nutrition Education/Counseling: No recommendation at this time  Coordination of Nutrition Care: Continue to monitor while inpatient       Goals:     Goals: PO intake 50% or greater,by next RD assessment       Nutrition Monitoring and Evaluation:      Food/Nutrient Intake Outcomes: Food and Nutrient Intake,Supplement Intake  Physical Signs/Symptoms Outcomes: Biochemical Data,Weight,Skin,Nutrition Focused Physical Findings    Discharge Planning:     Too soon to determine     Luna Christiansen RD, LD  Contact: [phone]

## 2022-05-06 NOTE — ED PROVIDER NOTES
[address] [facility]      Pt Name: Jason Mack  MRN: [id number]  [place] 1940  Date of evaluation: 5/6/2022  Provider: Kaity Mendes MD    CHIEF COMPLAINT       Chief Complaint   Patient presents with    Fatigue    Extremity Weakness       HISTORY OF PRESENT ILLNESS    Jason Mack is a 78 y.o. male who presents to the emergency department with knee, shortness of breath, generalized weakness. Has been going on the last few days. Worsening. Patient seems altered and confused at bedside therefore history limited. Nursing Notes were reviewed. Including nursing noted for FM, Surgical History, Past Medical History, Social History, vitals, and allergies; agree with all. REVIEW OF SYSTEMS       Review of Systems    Except as noted above the remainder of the review of systems was reviewed and negative.      PAST MEDICAL HISTORY     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 ([facility] [address] 9/6/2019    Chronic kidney disease (CKD)     Chronic kidney disease, stage IV (severe) ([facility] [address] 9/6/2019    Diabetic nephropathy ([facility] [address]     Hypertension        SURGICAL HISTORY       Past Surgical History:   Procedure Laterality Date    COLONOSCOPY      CT GUIDED CHEST TUBE  4/19/2022    CT GUIDED CHEST TUBE 4/19/2022 [facility] CT    JOINT REPLACEMENT         CURRENT MEDICATIONS       Previous Medications    ALLOPURINOL (ZYLOPRIM) 100 MG TABLET    Take 100 mg by mouth daily    ASPIRIN 325 MG EC TABLET    Take 325 mg by mouth daily    B COMPLEX-C-FOLIC ACID (NEPHROCAPS) 1 MG CAPSULE    Take 1 capsule by mouth daily    CALCIUM ACETATE 667 MG TABS    Take 2 capsules by mouth 3 times daily (with meals)     DOXEPIN (SINEQUAN) 10 MG CAPSULE    Take 10 mg by mouth nightly    FUROSEMIDE (LASIX) 40 MG TABLET    Take 80 mg by mouth 2 times daily     HYDRALAZINE (APRESOLINE) 10 MG TABLET    Take 1 tablet by mouth 2 times daily LINAGLIPTIN (TRADJENTA) 5 MG TABLET    Take 5 mg by mouth daily    METOPROLOL TARTRATE (LOPRESSOR) 25 MG TABLET    Take 1 tablet by mouth 2 times daily    TAMSULOSIN (FLOMAX) 0.4 MG CAPSULE    Take 0.4 mg by mouth nightly    VITAMIN D (CHOLECALCIFEROL) 25 MCG (1000 UT) TABS TABLET    Take 2,000 Units by mouth daily       ALLERGIES     Advil [ibuprofen], Naproxen, Pcn [penicillins], and Simvastatin    FAMILY HISTORY      History reviewed. No pertinent family history. SOCIAL HISTORY       Social History     Socioeconomic History    Marital status:      Spouse name: None    Number of children: None    Years of education: None    Highest education level: None   Occupational History    None   Tobacco Use    Smoking status: Former Smoker    Smokeless tobacco: Former User   Vaping Use    Vaping Use: Never used   Substance and Sexual Activity    Alcohol use: Not Currently     Comment: occasional    Drug use: None    Sexual activity: Not Currently   Other Topics Concern    None   Social History Narrative    None     Social Determinants of Health     Financial Resource Strain:     Difficulty of Paying Living Expenses: Not on file   Food Insecurity:     Worried About Running Out of Food in the Last Year: Not on file    Simón of Food in the Last Year: Not on file   Transportation Needs:     Lack of Transportation (Medical): Not on file    Lack of Transportation (Non-Medical):  Not on file   Physical Activity:     Days of Exercise per Week: Not on file    Minutes of Exercise per Session: Not on file   Stress:     Feeling of Stress : Not on file   Social Connections:     Frequency of Communication with Friends and Family: Not on file    Frequency of Social Gatherings with Friends and Family: Not on file    Attends Rastafari Services: Not on file    Active Member of Clubs or Organizations: Not on file    Attends Club or Organization Meetings: Not on file    Marital Status: Not on file Intimate Partner Violence:     Fear of Current or Ex-Partner: Not on file    Emotionally Abused: Not on file    Physically Abused: Not on file    Sexually Abused: Not on file   Housing Stability:     Unable to Pay for Housing in the Last Year: Not on file    Number of [place] in the Last Year: Not on file    Unstable Housing in the Last Year: Not on file       PHYSICAL EXAM       ED Triage Vitals [05/06/22 0106]   BP Temp Temp Source Pulse Resp SpO2 Height Weight   109/60 97.5 °F (36.4 °C) Oral 108 21 97 % -- 165 lb 12.6 oz (75.2 kg)       Physical Exam  Vitals and nursing note reviewed. Constitutional:       General: He is not in acute distress. Appearance: He is well-developed. He is ill-appearing. He is not diaphoretic. HENT:      Head: Normocephalic and atraumatic. Eyes:      General:         Right eye: No discharge. Left eye: No discharge. Pupils: Pupils are equal, round, and reactive to light. Neck:      Thyroid: No thyromegaly. Trachea: No tracheal deviation. Cardiovascular:      Rate and Rhythm: Regular rhythm. Tachycardia present. Heart sounds: No murmur heard. Pulmonary:      Breath sounds: Rales present. No wheezing. Chest:      Chest wall: No tenderness. Abdominal:      General: There is no distension. Palpations: Abdomen is soft. There is no mass. Tenderness: There is no abdominal tenderness. There is no guarding or rebound. Musculoskeletal:         General: No tenderness or deformity. Normal range of motion. Cervical back: Normal range of motion. Skin:     General: Skin is warm. Coloration: Skin is not pale. Findings: No erythema or rash. Neurological:      Mental Status: He is alert. He is disoriented. Cranial Nerves: No cranial nerve deficit. Motor: No abnormal muscle tone.       Coordination: Coordination normal.         DIAGNOSTIC RESULTS     RADIOLOGY:   Non-plain film images such as CT, Ultrasoundand MRI are read by the radiologist. Plain radiographic images are visualized and preliminarily interpreted by the emergency physician with the below findings:    Impression   1. Large area of right upper lobe dense consolidation with subtle areas of   calcific density seen which may represent a central right upper lobe neoplasm   and postobstructive consolidation. 2. Similar appearing pathologic mediastinal lymphadenopathy with subtle   calcification noted as well. 3. Small right-sided pleural effusion status post partial evacuation from   recent chest tube placement with a small amount of gas remaining.  Loculated   appearing pleural effusion remains. 4. Patchy areas of consolidation and reticulonodular change seen within the   right lung suspicious for multifocal pneumonia. 5. There is a 17 mm pulmonary nodule in the right lower lobe concerning for   possible metastatic disease.  No left-sided pulmonary nodules are identified. 6. Prominent mucosal folds seen within a few loops of jejunum in the left   abdomen with some interloop fluid, which can be seen in the setting of   enteritis though the interloop fluid may be secondary to the indwelling   peritoneal dialysis catheter and small volume upper abdominal and pelvic   ascites. 7. Cholelithiasis without findings to suggest cholecystitis. 8. No definite metastatic disease seen in the abdomen or pelvis.      ED BEDSIDE ULTRASOUND:   Performed by ED Physician - none    LABS:  Labs Reviewed   CBC WITH AUTO DIFFERENTIAL - Abnormal; Notable for the following components:       Result Value    RBC 2.07 (*)     Hemoglobin 7.2 (*)     Hematocrit 22.1 (*)     .4 (*)     MCH 34.7 (*)     RDW 20.0 (*)     Neutrophils Absolute 7.8 (*)     Lymphocytes Absolute 0.4 (*)     All other components within normal limits   COVID-19, RAPID   RAPID INFLUENZA A/B ANTIGENS   AMMONIA   BASIC METABOLIC PANEL W/ REFLEX TO MG FOR LOW K   BRAIN NATRIURETIC PEPTIDE LACTIC ACID   TSH WITH REFLEX   HEPATIC FUNCTION PANEL   LIPASE   CK   SALICYLATE LEVEL   ACETAMINOPHEN LEVEL   URINALYSIS WITH REFLEX TO CULTURE   PROCALCITONIN   ETHANOL       All other labs were withinnormal range or not returned as of this dictation. EMERGENCY DEPARTMENT COURSE and DIFFERENTIAL DIAGNOSIS/MDM:     PMH, Surgical Hx, FH, Social Hx reviewed by myself (ETOH usage, Tobacco usage, Drug usage reviewed by myself, no pertinent Hx)- No Pertinent Hx     Old records were reviewed by me     78-year-old male with multifocal pneumonia. Antibiotics initiated. Does have looks like for his progressive lung CA. Will need palliative care. On nasal cannula currently. Hypoxia resolved with nasal cannula. Hemodynamically stable. Will need to be admitted for hypoxia and multifocal pneumonia. CRITICAL CARE TIME   Total Critical Caretime was 39 minutes, excluding separately reportable procedures. There was a high probability of clinically significant/life threatening deterioration in the patient's condition which required my urgent intervention. PROCEDURES:  Unlessotherwise noted below, none    FINAL IMPRESSION      1. Hypoxia    2.  Pneumonia due to infectious organism, unspecified laterality, unspecified part of lung          DISPOSITION/PLAN   DISPOSITION      Admission    (Please note that portions ofthis note were completed with a voice recognition program.  Efforts were made to edit the dictations but occasionally words are mis-transcribed.)    Luz Lin MD(electronically signed)  Attending Emergency Physician        Luz Lin MD  05/06/22 5267

## 2022-05-06 NOTE — CONSULTS
PALLIATIVE MEDICINE CONSULTATION     Patient name:Choco Sanchez   CXI:[id number]    RXL:1/50/4445  Room/Bed:Q5B-3004/5268-01   LOS: 0 days         Date of consult:5/6/2022    Consult Information    Inpatient consult to Palliative Care  Consult performed by: YAZ Adam CNP  Consult ordered by: DO Sanam         Reason for Consult: Goals of Care, Code Status, 30 day readmit    ASSESSMENT/RECOMMENDATIONS     78 y.o. male with hypoxia and debility. Symptom Management:  1. Hypoxia: Patient on 4 liters of oxygen via a nasal cannula. 2. Debility: Patient experienced some dyspnea at rest with speech while using his nasal cannula today. Patient was lethargic and appeared to be weak while lying in the bed today. Patient is requiring some assistance with his ADL's at this time. 3. Goals of Care:  Met patient at his bedside. Patient was lethargic but oriented x 4 today. Patient indicated that his primary medical contact should be his daughter-in-law Tracie Fair who is  to his son Phil El (the patient indicated Phil El is his POA but Tracie Fair should be contacted first in order get a hold of Jeramy). I was able to speak with Tracie Fair and she indicated that she and Phil El would work together on decision making for the patient when required. Tracie Marv also indicated that she would be the first person to contact in order to get a hold of Jeramy for decision making. Reviewed patients current health status, goals of care and code status with the patient and his daughter-in-law Tracie Fair (I speaking to Tracie Marv by phone during my visit today). The patient would like to continue with his current level aggressive therapy with hopes of being able to return home to be with his spouse Felicita Diaz. Tracie Fair indicated that the patient is scheduled to meet with [facility] this coming Monday to discuss treatment options/plan.   Code status was reviewed and the patient would like to remain a Full Code at this time. Patient/Family Goals of Care :    5/6 - Met patient at his bedside. Patient was lethargic but oriented x 4 today. Patient indicated that his primary medical contact should be his daughter-in-law Prince Pascual who is  to his son Vearl Hashimoto (the patient indicated Vearl Hashimoto is his POA but Prince Pascual should be contacted first in order get a hold of Jeramy). I was able to speak with Prince Pascual and she indicated that she and Vearl Hashimoto would work together on decision making for the patient when required. Prince Barkertiffanie also indicated that she would be the first person to contact in order to get a hold of Jeramy for decision making. Reviewed patients current health status, goals of care and code status with the patient and his daughter-in-law Prince Pascual (I speaking to Alcides Passtiffanie by phone during my visit today). The patient would like to continue with his current level aggressive therapy with hopes of being able to return home to be with his spouse Celi Harrington. Prince Pascual indicated that the patient is scheduled to meet with [facility] this coming Monday to discuss treatment options/plan. Code status was reviewed and the patient would like to remain a Full Code at this time. Disposition/Discharge Plan:   Pending. Advance Directives:  · Surrogate Decision Maker: Per patient, Vearl Hashimoto his son is POA but the first person to contact is Prince Pascual (Jeramy's spouse) for decision making. Prince Pascual indicated it can be tough to reach Jeramy at times and that Alcides Passtiffanie would be able to assist in reaching Jeramy for decision making purposes if required. · Code status:  Full Code    Case discussed with: patient, floor RN, Prince Pascual (daughter-in-law)  Thank you for allowing us to participate in the care of this patient. HISTORY     CC: Hypoxia  HPI: The patient is a 78 y.o. male with a past medical history of CKD, HTN and right upper lobe lung cancer who presented to [facility] with shortness of breath and generalized weakness.   Patient was admitted with hypoxia, sepsis and pneumonia. Palliative Medicine SymptomScreening/ROS:    Review of Systems   Constitutional: Positive for fatigue. HENT: Negative. Eyes: Negative. Respiratory: Positive for shortness of breath. Cardiovascular: Negative. Gastrointestinal: Negative. Musculoskeletal: Negative. Skin: Negative. Neurological: Positive for weakness. Psychiatric/Behavioral: Negative. All other systems reviewed and are negative. A complete 10 count ROS was obtained. Pertinent positives mentioned above in HPI/ROS. All others if not mentioned are negative. Palliative Performance Scale:     [] 60%  Amb reduced; Sig dz. Can't do hobbies/housework; Intake normal or reduced, Occasional assist; LOC full/confusion   [] 50%  Mainly sit/lie; Extensive disease. Mainly assist, Intake normal or reduced; Occasional assist; LOC full/confusion   [x] 40%  Mainly in bed; Extensive disease; Mainly assist; Intake normal or reduced; Occasional assist; LOC full/confusion   [] 30%  Bed bound, Extensive disease; Total care; Intake reduced; LOC full/confusion   [] 20%  Bed bound; Extensive disease; Total care; Intake minimal; Drowsy/coma   [] 10%  Bed bound; Extensive disease; Total care; Mouth care only; Drowsy/coma   []  0%   Death     Home med list and hospital medications reviewed in chart as of 5/6/2022     EXAM     Vitals:    05/06/22 1205   BP: (!) 90/58   Pulse: 86   Resp: 25   Temp: 97.7 °F (36.5 °C)   SpO2: 94%       Physical Exam  Vitals reviewed. Constitutional:       Appearance: He is ill-appearing. Interventions: Nasal cannula in place. HENT:      Head: Normocephalic and atraumatic. Nose: Nose normal.   Eyes:      Extraocular Movements: Extraocular movements intact. Pupils: Pupils are equal, round, and reactive to light. Cardiovascular:      Rate and Rhythm: Normal rate. Pulses: Normal pulses.       Comments: hypotensive  Pulmonary:      Comments: Breath sounds

## 2022-05-06 NOTE — CARE COORDINATION
INITIAL CASE MANAGEMENT ASSESSMENT    Reviewed chart, spoke to patient's granddaughter, spouse (via iPad), & daughter in law Anika Jimenez via speaker phone to assess possible discharge needs. Explained Case Management role/services. Living Situation: Confirmed patient lives with wife, son & adult grandsons in a single family home. 2 steps to enter, son working on getting a ramped entrance. ADLs: Patient has been requiring more assist due to shortness of breath & pain, family assists PRN     DME: Wheeled walker, wheelchair, cane    PT/OT Recs: Not ordered     Active Services: None     Transportation: Family transports PRN     Medications: Uses [employer] on [address] -- no issues obtaining/affording     PCP: Kolton Mujica MD      HD/PD: PD at home, runs at night--son assists; supplies through [employer]    PLAN/COMMENTS: Family wants patient to return home. Family interested in getting a hospital bed due to patient's inability to lay flat. Will notify MD of DME needs. ACP deferred at this time for Palliative care to discuss goals of care. SW/CM provided contact information for patient or family to call with any questions. SW/CM will follow and assist as needed.   Electronically signed by Keerthi Koo RN Case Management [phone] on 5/6/2022 at 2:04 PM

## 2022-05-06 NOTE — CONSULTS
Nephrology ([facility] Consult Note    Kelley Calles is a 78 y.o. male whom we were asked to see for ESRD management. He was diagnosed with RUL lung cancer. He presents with AMS and worsening fatigue x 1 week. He has poor PO intake. He his dialysis adequacy has been low (fairly significantly). Past Medical History:  ESRD on PD  HTN  DM2      Review of System:  Otherwise unremarkable    Allergies:  Advil [ibuprofen], Naproxen, Pcn [penicillins], and Simvastatin    Medications:  Current medications reviewed. Social History:  former tobacco  Family Medical History:  Negative for kidney disase    Physical Exam:  Blood pressure 114/61, pulse 83, temperature 97.7 °F (36.5 °C), temperature source Oral, resp. rate 23, height 5' 11\" (1.803 m), weight 165 lb 12.6 oz (75.2 kg), SpO2 99 %. General:  NAD, A+Ox3, ill-appearing, frail body habitus  HEENT:  PERRL, EOMI  Neck:  Supple, normal range of movement, thyroid unremarkable.     Chest:  CTAB, good respiratory effort, good air movement  CV:  RRR, no murmurs or rubs, no carotid bruit, no abdominal bruits  Abdomen:  NTND, soft, +BS, no hepatosplenomegaly  Extremities:  No peripheral edema  Neurological:  Moving all four extremities, CN II-XII grossly intact, no asterixis  Lymphatics:  No palpable lymph nodes  Skin:  No rash, no jaundice  Psychiatric:  moderate insight and judgement, moderate recall    Laboratory Studies:  Lab Results   Component Value Date/Time     (L) 05/06/2022 03:36 AM    K 4.3 05/06/2022 03:36 AM    CL 90 (L) 05/06/2022 03:36 AM    CO2 15 (L) 05/06/2022 03:36 AM     (HH) 05/06/2022 03:36 AM    CREATININE 13.1 (HH) 05/06/2022 03:36 AM    CALCIUM 6.8 (L) 05/06/2022 03:36 AM    PHOS 7.7 (H) 04/21/2022 04:29 AM    MG 2.50 (H) 04/19/2022 04:49 AM     Lab Results   Component Value Date/Time    WBC 9.0 05/06/2022 01:28 AM    HGB 7.2 (L) 05/06/2022 01:28 AM    HCT 22.1 (L) 05/06/2022 01:28 AM     05/06/2022 01:28 AM       Assessment/Plan:  Reviewed old records and labs. 1) ESRD  - continue PD with new regimen  - d/w with PDU regarding dialysis adequacy as his last Kt/V was quite low, and while it may be partially a collection issue, I am not sure that we will be able to achieve sufficient adequacy, but the assessment is that if we switched him to HD, he will die even quicker    2) AMS  - mildly confused  - unfortunately, this could indeed be uremia from inadequate dialysis  - no asterixis    3) metastatic lung cancer  - per family, some details regarding specifics of diagnosis are unknown at this time  - he has not met with [facility] in office yet    4) failure to thrive  - patient's health is deteriorating at on obvious pace    5) FEN  - metabolic acidosis   - start NaHCO3 for several days to assess response to new dialysis regimen  - hyponatremia   - monitor  - renal osteodystrophy   - resume phoslo  - hypocalcemia   - as patient is asymptomatic, it does not require supplementation as all dialysis patients are in positive calcium balance   - resuming the phoslo will somewhat help with this issue    6) anemia  - defer to oncology    7) ID  - on empiric flagyl ,cefepime, and vancomycin  - where cultures sent?  - not sure I see significant evidence of infection with no leukocytosis, nor hypotension, nor fever      advanced care planning (x 20+ min)  Discussed with the patient's wife, daughter-in-law, and granddaughter. I expressed the above concerns. His prognosis is extremely poor. Encouraged the addressing of code status. I believe hospice is appropriate. Palliative care has met with them earlier today.

## 2022-05-06 NOTE — PROGRESS NOTES
Medication Reconciliation    List of medications patient is currently taking is complete. Source of information: 1. Conversation with patient's family on phone                                      2. EPIC records        Notes regarding home medications:   1. Hydralazine was discontinued last week due to hypotension. This was removed from list.  2. Tramadol started 5/3/22 for back pain.  Added to list.  3. Loratadine added to list.       Tigre Camara, [facility], PharmD, 5/6/2022 10:22 AM

## 2022-05-07 LAB
ANION GAP SERPL CALCULATED.3IONS-SCNC: 30 MMOL/L (ref 3–16)
APPEARANCE FLUID: CLEAR
BASOPHILS ABSOLUTE: 0.1 K/UL (ref 0–0.2)
BASOPHILS RELATIVE PERCENT: 0.9 %
BUN BLDV-MCNC: 103 MG/DL (ref 7–20)
CALCIUM SERPL-MCNC: 7.4 MG/DL (ref 8.3–10.6)
CELL COUNT FLUID TYPE: NORMAL
CHLORIDE BLD-SCNC: 95 MMOL/L (ref 99–110)
CLOT EVALUATION: NORMAL
CO2: 14 MMOL/L (ref 21–32)
COLOR FLUID: COLORLESS
CREAT SERPL-MCNC: 11.8 MG/DL (ref 0.8–1.3)
EOSINOPHILS ABSOLUTE: 0.1 K/UL (ref 0–0.6)
EOSINOPHILS RELATIVE PERCENT: 0.8 %
FLUID DIFF COMMENT: NORMAL
GFR AFRICAN AMERICAN: 5
GFR NON-AFRICAN AMERICAN: 4
GLUCOSE BLD-MCNC: 129 MG/DL (ref 70–99)
GLUCOSE BLD-MCNC: 148 MG/DL (ref 70–99)
GLUCOSE BLD-MCNC: 171 MG/DL (ref 70–99)
GLUCOSE BLD-MCNC: 185 MG/DL (ref 70–99)
GLUCOSE BLD-MCNC: 208 MG/DL (ref 70–99)
HCT VFR BLD CALC: 20 % (ref 40.5–52.5)
HEMOGLOBIN: 6.7 G/DL (ref 13.5–17.5)
LYMPHOCYTES ABSOLUTE: 0.4 K/UL (ref 1–5.1)
LYMPHOCYTES RELATIVE PERCENT: 4.3 %
MAGNESIUM: 2.6 MG/DL (ref 1.8–2.4)
MCH RBC QN AUTO: 35 PG (ref 26–34)
MCHC RBC AUTO-ENTMCNC: 33.4 G/DL (ref 31–36)
MCV RBC AUTO: 104.8 FL (ref 80–100)
MONOCYTES ABSOLUTE: 0.9 K/UL (ref 0–1.3)
MONOCYTES RELATIVE PERCENT: 9.5 %
NEUTROPHILS ABSOLUTE: 7.7 K/UL (ref 1.7–7.7)
NEUTROPHILS RELATIVE PERCENT: 84.5 %
NUCLEATED CELLS FLUID: <3 /CUMM
PDW BLD-RTO: 19.4 % (ref 12.4–15.4)
PERFORMED ON: ABNORMAL
PLATELET # BLD: 343 K/UL (ref 135–450)
PMV BLD AUTO: 7.4 FL (ref 5–10.5)
POTASSIUM SERPL-SCNC: 3.5 MMOL/L (ref 3.5–5.1)
RBC # BLD: 1.91 M/UL (ref 4.2–5.9)
RBC FLUID: <2000 /CUMM
SODIUM BLD-SCNC: 139 MMOL/L (ref 136–145)
VANCOMYCIN RANDOM: 8.3 UG/ML
WBC # BLD: 9.1 K/UL (ref 4–11)

## 2022-05-07 PROCEDURE — 87205 SMEAR GRAM STAIN: CPT

## 2022-05-07 PROCEDURE — A4722 DIALYS SOL FLD VOL > 1999CC: HCPCS | Performed by: INTERNAL MEDICINE

## 2022-05-07 PROCEDURE — 36415 COLL VENOUS BLD VENIPUNCTURE: CPT

## 2022-05-07 PROCEDURE — 89050 BODY FLUID CELL COUNT: CPT

## 2022-05-07 PROCEDURE — 2580000003 HC RX 258: Performed by: STUDENT IN AN ORGANIZED HEALTH CARE EDUCATION/TRAINING PROGRAM

## 2022-05-07 PROCEDURE — 2580000003 HC RX 258: Performed by: INTERNAL MEDICINE

## 2022-05-07 PROCEDURE — 80048 BASIC METABOLIC PNL TOTAL CA: CPT

## 2022-05-07 PROCEDURE — 2700000000 HC OXYGEN THERAPY PER DAY

## 2022-05-07 PROCEDURE — 94761 N-INVAS EAR/PLS OXIMETRY MLT: CPT

## 2022-05-07 PROCEDURE — 6370000000 HC RX 637 (ALT 250 FOR IP): Performed by: STUDENT IN AN ORGANIZED HEALTH CARE EDUCATION/TRAINING PROGRAM

## 2022-05-07 PROCEDURE — 6370000000 HC RX 637 (ALT 250 FOR IP): Performed by: INTERNAL MEDICINE

## 2022-05-07 PROCEDURE — 90945 DIALYSIS ONE EVALUATION: CPT

## 2022-05-07 PROCEDURE — 2060000000 HC ICU INTERMEDIATE R&B

## 2022-05-07 PROCEDURE — 85025 COMPLETE CBC W/AUTO DIFF WBC: CPT

## 2022-05-07 PROCEDURE — 2500000003 HC RX 250 WO HCPCS: Performed by: STUDENT IN AN ORGANIZED HEALTH CARE EDUCATION/TRAINING PROGRAM

## 2022-05-07 PROCEDURE — 83735 ASSAY OF MAGNESIUM: CPT

## 2022-05-07 PROCEDURE — 6360000002 HC RX W HCPCS: Performed by: STUDENT IN AN ORGANIZED HEALTH CARE EDUCATION/TRAINING PROGRAM

## 2022-05-07 PROCEDURE — 2500000003 HC RX 250 WO HCPCS: Performed by: INTERNAL MEDICINE

## 2022-05-07 PROCEDURE — 87070 CULTURE OTHR SPECIMN AEROBIC: CPT

## 2022-05-07 PROCEDURE — 3E1M39Z IRRIGATION OF PERITONEAL CAVITY USING DIALYSATE, PERCUTANEOUS APPROACH: ICD-10-PCS | Performed by: INTERNAL MEDICINE

## 2022-05-07 PROCEDURE — 80202 ASSAY OF VANCOMYCIN: CPT

## 2022-05-07 RX ORDER — SODIUM BICARBONATE 650 MG/1
1300 TABLET ORAL 3 TIMES DAILY
Status: DISCONTINUED | OUTPATIENT
Start: 2022-05-07 | End: 2022-05-11 | Stop reason: HOSPADM

## 2022-05-07 RX ORDER — SODIUM CHLORIDE 9 MG/ML
INJECTION, SOLUTION INTRAVENOUS PRN
Status: DISCONTINUED | OUTPATIENT
Start: 2022-05-07 | End: 2022-05-11 | Stop reason: HOSPADM

## 2022-05-07 RX ADMIN — SODIUM BICARBONATE 650 MG: 650 TABLET ORAL at 09:12

## 2022-05-07 RX ADMIN — CALCIUM ACETATE 1334 MG: 667 CAPSULE ORAL at 09:12

## 2022-05-07 RX ADMIN — CALCIUM ACETATE 1334 MG: 667 CAPSULE ORAL at 12:35

## 2022-05-07 RX ADMIN — ALLOPURINOL 100 MG: 100 TABLET ORAL at 09:12

## 2022-05-07 RX ADMIN — INSULIN LISPRO 1 UNITS: 100 INJECTION, SOLUTION INTRAVENOUS; SUBCUTANEOUS at 22:05

## 2022-05-07 RX ADMIN — METOPROLOL TARTRATE 25 MG: 25 TABLET, FILM COATED ORAL at 22:04

## 2022-05-07 RX ADMIN — ASPIRIN 325 MG: 325 TABLET, COATED ORAL at 09:12

## 2022-05-07 RX ADMIN — HEPARIN SODIUM 5000 UNITS: 5000 INJECTION INTRAVENOUS; SUBCUTANEOUS at 13:55

## 2022-05-07 RX ADMIN — HEPARIN SODIUM 5000 UNITS: 5000 INJECTION INTRAVENOUS; SUBCUTANEOUS at 22:05

## 2022-05-07 RX ADMIN — METRONIDAZOLE 500 MG: 500 INJECTION, SOLUTION INTRAVENOUS at 06:08

## 2022-05-07 RX ADMIN — SODIUM CHLORIDE, SODIUM LACTATE, CALCIUM CHLORIDE, MAGNESIUM CHLORIDE AND DEXTROSE 6000 ML: 1.5; 538; 448; 18.3; 5.08 INJECTION, SOLUTION INTRAPERITONEAL at 22:38

## 2022-05-07 RX ADMIN — CALCIUM ACETATE 1334 MG: 667 CAPSULE ORAL at 17:35

## 2022-05-07 RX ADMIN — METRONIDAZOLE 500 MG: 500 INJECTION, SOLUTION INTRAVENOUS at 22:36

## 2022-05-07 RX ADMIN — METOPROLOL TARTRATE 25 MG: 25 TABLET, FILM COATED ORAL at 09:12

## 2022-05-07 RX ADMIN — DOXEPIN HYDROCHLORIDE 10 MG: 10 CAPSULE ORAL at 22:05

## 2022-05-07 RX ADMIN — Medication 10 ML: at 22:07

## 2022-05-07 RX ADMIN — INSULIN LISPRO 2 UNITS: 100 INJECTION, SOLUTION INTRAVENOUS; SUBCUTANEOUS at 09:16

## 2022-05-07 RX ADMIN — GENTAMICIN SULFATE: 1 CREAM TOPICAL at 09:13

## 2022-05-07 RX ADMIN — ACETAMINOPHEN 650 MG: 325 TABLET ORAL at 17:35

## 2022-05-07 RX ADMIN — SODIUM BICARBONATE 1300 MG: 650 TABLET ORAL at 22:04

## 2022-05-07 RX ADMIN — METRONIDAZOLE 500 MG: 500 INJECTION, SOLUTION INTRAVENOUS at 13:59

## 2022-05-07 RX ADMIN — CEFEPIME HYDROCHLORIDE 1000 MG: 1 INJECTION, POWDER, FOR SOLUTION INTRAMUSCULAR; INTRAVENOUS at 07:11

## 2022-05-07 RX ADMIN — INSULIN LISPRO 1 UNITS: 100 INJECTION, SOLUTION INTRAVENOUS; SUBCUTANEOUS at 12:35

## 2022-05-07 RX ADMIN — SODIUM CHLORIDE, SODIUM LACTATE, CALCIUM CHLORIDE, MAGNESIUM CHLORIDE AND DEXTROSE 3000 ML: 2.5; 538; 448; 18.3; 5.08 INJECTION, SOLUTION INTRAPERITONEAL at 22:38

## 2022-05-07 RX ADMIN — HEPARIN SODIUM 5000 UNITS: 5000 INJECTION INTRAVENOUS; SUBCUTANEOUS at 06:10

## 2022-05-07 RX ADMIN — TAMSULOSIN HYDROCHLORIDE 0.4 MG: 0.4 CAPSULE ORAL at 22:04

## 2022-05-07 ASSESSMENT — PAIN - FUNCTIONAL ASSESSMENT: PAIN_FUNCTIONAL_ASSESSMENT: ACTIVITIES ARE NOT PREVENTED

## 2022-05-07 ASSESSMENT — PAIN DESCRIPTION - DESCRIPTORS: DESCRIPTORS: DISCOMFORT

## 2022-05-07 ASSESSMENT — PAIN SCALES - GENERAL
PAINLEVEL_OUTOF10: 3
PAINLEVEL_OUTOF10: 0
PAINLEVEL_OUTOF10: 0

## 2022-05-07 ASSESSMENT — PAIN DESCRIPTION - LOCATION: LOCATION: TOE (COMMENT WHICH ONE)

## 2022-05-07 ASSESSMENT — PAIN DESCRIPTION - ORIENTATION: ORIENTATION: RIGHT;LEFT

## 2022-05-07 NOTE — PROGRESS NOTES
[facility]  Phone: [phone]  Fax: [phone]  [email or web address] [facility]      Kat Montes is a 78 y.o. male whom we were asked to see for ESRD management. He was diagnosed with RUL lung cancer. He presents with AMS and worsening fatigue x 1 week. He has poor PO intake. He his dialysis adequacy has been low (fairly significantly). Interval History:  -No acute events overnight  -PD in progress      ROS: Complaining of foot pain. Asking for more pain medications. All pertinent ROS negative. SHx: Multiple visitors present at bedside. All questions answered      Physical Exam:  Blood pressure 113/63, pulse 71, temperature 97.7 °F (36.5 °C), temperature source Axillary, resp. rate 30, height 5' 11\" (1.803 m), weight 167 lb 12.3 oz (76.1 kg), SpO2 97 %. Gen: NAD, Frail appearing  HEENT: Sclera anicteric, MMM  Neck: Supple. No JVD  CV: RRR, S1/S2  Pulm: CTAB/L  Abd: Soft, NT, ND, (+) PD catheter in LMQ  Ext: 1+ pitting edema in B/L LE  Neuro: Awake/Alert, Answers questions appropriately  Skin: Warm.  No significant visible rashes appreciated      Laboratory Studies:  Lab Results   Component Value Date/Time     05/07/2022 06:22 AM    K 3.5 05/07/2022 06:22 AM    K 4.3 05/06/2022 03:36 AM    CL 95 (L) 05/07/2022 06:22 AM    CO2 14 (LL) 05/07/2022 06:22 AM     (HH) 05/07/2022 06:22 AM    CREATININE 11.8 (HH) 05/07/2022 06:22 AM    CALCIUM 7.4 (L) 05/07/2022 06:22 AM    PHOS 7.7 (H) 04/21/2022 04:29 AM    MG 2.60 (H) 05/07/2022 06:22 AM     Lab Results   Component Value Date/Time    WBC 9.1 05/07/2022 06:22 AM    HGB 6.7 (LL) 05/07/2022 06:22 AM    HCT 20.0 (LL) 05/07/2022 06:22 AM     05/07/2022 06:22 AM       Assessment/Plan:    1) ESRD on PD  -Dr. Elena Cobian discussed with PDU and there were concerns for inadequate PD (vs. poor collection)  -PD regimen adjusted by Dr. Elena Cobian  -Continue new PD regimen for now    2) AMS - mildly confused with concerns for uremia from inadequate dialysis  -More awake/alert today  -New PD Rx as above  -Management per Primary Team    3) metastatic lung cancer - per family, some details regarding specifics of diagnosis are unknown at this time - he has not met with [facility] in office yet  -Management per Primary Team    4) failure to thrive  -Management per Primary Team    5) FEN  - metabolic acidosis - below goal. Increase sodium bicarb to 1300mg PO TID  - hyponatremia - improving with new PD regimen    Hypocalcemia/CKD-MBD:  -On Phoslo  -Corrected Ca++: 8.8  -Check Phos, PTH, Vit D levels    6) anemia  -Below goal  -Unable to receive pRBC transfusion today  -Can dose Epo SQ if OK with Oncology    7) ID  - on empiric flagyl ,cefepime, and vancomycin  -Management per Primary Team

## 2022-05-07 NOTE — PROGRESS NOTES
Site cleaned and dressing changed to PD site. Skin is clean, dry and intact. Gentamycin cream applied to area. Patient has no complaints of pain at this time. Call light within reach, completed breakfast and tray removed.

## 2022-05-07 NOTE — PROGRESS NOTES
Hospitalist Progress Note      PCP: Wilson Cortes MD    Date of Admission: 5/6/2022    Chief Complaint: anemia    Hospital Course:      Subjective: anemia slightly worse today. No complaints       Medications:  Reviewed    Infusion Medications    sodium chloride      sodium chloride      dextrose       Scheduled Medications    aspirin  325 mg Oral Daily    allopurinol  100 mg Oral Daily    doxepin  10 mg Oral Nightly    metoprolol tartrate  25 mg Oral BID    tamsulosin  0.4 mg Oral Nightly    sodium chloride flush  5-40 mL IntraVENous 2 times per day    heparin (porcine)  5,000 Units SubCUTAneous 3 times per day    metroNIDAZOLE  500 mg IntraVENous Q8H    insulin lispro  0-6 Units SubCUTAneous TID WC    insulin lispro  0-3 Units SubCUTAneous Nightly    cefepime  1,000 mg IntraVENous Q24H    dianeal lo-chris 1.5%  6,000 mL IntraPERitoneal Nightly    dianeal lo-chris 2.5%  3,000 mL IntraPERitoneal Nightly    gentamicin   Topical Daily    sodium bicarbonate  650 mg Oral BID    calcium acetate  2 capsule Oral TID WC     PRN Meds: sodium chloride, sodium chloride flush, sodium chloride, acetaminophen **OR** acetaminophen, glucose, dextrose, glucagon (rDNA), dextrose      Intake/Output Summary (Last 24 hours) at 5/7/2022 1213  Last data filed at 5/7/2022 0928  Gross per 24 hour   Intake 420 ml   Output 815 ml   Net -395 ml       Exam:    /67   Pulse 78   Temp 97.8 °F (36.6 °C) (Axillary)   Resp 15   Ht 5' 11\" (1.803 m)   Wt 167 lb 12.3 oz (76.1 kg)   SpO2 99%   BMI 23.40 kg/m²     General appearance: No apparent distress, appears stated age and cooperative. HEENT: Pupils equal, round, and reactive to light. Conjunctivae/corneas clear. Neck: Supple, with full range of motion. No jugular venous distention. Trachea midline. Respiratory:  Normal respiratory effort. Clear to auscultation, bilaterally without Rales/Wheezes/Rhonchi.   Cardiovascular: Regular rate and rhythm with normal S1/S2 without murmurs, rubs or gallops. Abdomen: Soft, non-tender, non-distended with normal bowel sounds. Musculoskeletal: No clubbing, cyanosis or edema bilaterally. Full range of motion without deformity. Skin: Skin color, texture, turgor normal.  No rashes or lesions. Neurologic:  Neurovascularly intact without any focal sensory/motor deficits. Cranial nerves: II-XII intact, grossly non-focal.  Psychiatric: Alert and oriented, thought content appropriate, normal insight  Capillary Refill: Brisk,< 3 seconds   Peripheral Pulses: +2 palpable, equal bilaterally       Labs:   Recent Labs     05/06/22  0128 05/07/22 0622   WBC 9.0 9.1   HGB 7.2* 6.7*   HCT 22.1* 20.0*    343     Recent Labs     05/06/22  0336 05/07/22 0622   * 139   K 4.3 3.5   CL 90* 95*   CO2 15* 14*   * 103*   CREATININE 13.1* 11.8*   CALCIUM 6.8* 7.4*     Recent Labs     05/06/22  0336   AST 27   ALT 10   BILIDIR <0.2   BILITOT <0.2   ALKPHOS 101     No results for input(s): INR in the last 72 hours. Recent Labs     05/06/22  0336   CKTOTAL 68       Urinalysis:    No results found for: Tyrell Tanisha, BACTERIA, RBCUA, BLOODU, SPECGRAV, GLUCOSEU    Radiology:  CT CHEST ABDOMEN PELVIS WO CONTRAST   Preliminary Result   1. Large area of right upper lobe dense consolidation with subtle areas of   calcific density seen which may represent a central right upper lobe neoplasm   and postobstructive consolidation. 2. Similar appearing pathologic mediastinal lymphadenopathy with subtle   calcification noted as well. 3. Small right-sided pleural effusion status post partial evacuation from   recent chest tube placement with a small amount of gas remaining. Loculated   appearing pleural effusion remains. 4. Patchy areas of consolidation and reticulonodular change seen within the   right lung suspicious for multifocal pneumonia.    5. There is a 17 mm pulmonary nodule in the right lower lobe concerning for   possible metastatic disease. No left-sided pulmonary nodules are identified. 6. Prominent mucosal folds seen within a few loops of jejunum in the left   abdomen with some interloop fluid, which can be seen in the setting of   enteritis though the interloop fluid may be secondary to the indwelling   peritoneal dialysis catheter and small volume upper abdominal and pelvic   ascites. 7. Cholelithiasis without findings to suggest cholecystitis. 8. No definite metastatic disease seen in the abdomen or pelvis. CT HEAD WO CONTRAST   Final Result   No acute intracranial abnormality. IR GUIDED PERC PLEURAL DRAIN W CATH INSERT    (Results Pending)           Assessment/Plan:    Active Hospital Problems    Diagnosis Date Noted    Pneumonia [J18.9] 05/06/2022     Priority: Medium    Malignancy ([facility] [C80.1] 05/06/2022     Priority: Medium    Sepsis ([address] [A41.9] 05/06/2022     Priority: Medium    Uremia [N19] 05/06/2022     Priority: Medium    ESRD (end stage renal disease) ([facility] [N18.6] 05/06/2022     Priority: Medium    DM2 (diabetes mellitus, type 2) ([facility] [E11.9] 05/06/2022     Priority: Medium    Severe malnutrition ([facility] [E43] 05/06/2022     Priority: Medium     AMS  - improvement due to improved hypoxia  - still could be from uremia   - nephro managing  - antibiotics were started empirically but no signs of local infection --> consider d/c tomorrow    Recurrent right pleural Effusion  Acute hypoxemia  - last thoracentesis was in April  - Pleurx catheter to be placed on Monday  - wean oxygen as tolerated    Metastatic Lung Cancer: Outpatient ongoing surveillance    ESRD on PD    DVT Prophylaxis: heparin  Diet: ADULT DIET; Regular; 4 carb choices (60 gm/meal); Low Sodium (2 gm); Low Potassium (Less than 3000 mg/day);  Low Phosphorus (Less than 1000 mg); 1500 ml  ADULT ORAL NUTRITION SUPPLEMENT; Breakfast, Lunch, Dinner; Diabetic Oral Supplement  Diet NPO  Code Status: Full Code    PT/OT Eval Status: n/a    Dispo - Lulu Torres MD

## 2022-05-07 NOTE — PLAN OF CARE
Problem: Skin/Tissue Integrity  Goal: Absence of new skin breakdown  Description: 1. Monitor for areas of redness and/or skin breakdown  2. Assess vascular access sites hourly  3. Every 4-6 hours minimum:  Change oxygen saturation probe site  4. Every 4-6 hours:  If on nasal continuous positive airway pressure, respiratory therapy assess nares and determine need for appliance change or resting period. Outcome: Progressing   Patient's skin has been assessed per unit protocol and the patient is being repositioned or encouraged to turn every two hours to prevent skin breakdown and promote healing. Problem: Safety - Adult  Goal: Free from fall injury  Outcome: Progressing   Fall risk assessment completed per unit protocol. Patient's bed is in the lowest position, call light is within reach and the patient's room is free of clutter. The patient has been instructed to call for assistance before getting out of bed or the chair. Problem: Pain  Goal: Verbalizes/displays adequate comfort level or baseline comfort level  Outcome: Progressing   Pain/discomfort being managed with PRN analgesics per MD orders. Pt able to express presence and absence of pain and rate pain appropriately using numerical scale.      Problem: Nutrition Deficit:  Goal: Optimize nutritional status  Outcome: Progressing   Patient has a low appetite, encouraged to eat food he likes

## 2022-05-07 NOTE — PROGRESS NOTES
Patient's hgb low this morning at 6.7. RN informed the hospitalist Dr Ivania Spencer. Doctor ordered a type and screen and 1 unit of blood to be transfused. RN notified by lab phlebotomist that two of them have tried multiple times to get the type and screen lab drawn for this patient and have been unsuccessful. RN informed the doctor of this and asking for how she would like us to move forward with this. Doctor Ivania Spencer suggests waiting until routine AM labs to monitor his blood count. Patient has no outward signs of bleeding at this time and VSS. Patient is in agreement with this plan. Will continue to closely monitor the patient.

## 2022-05-08 LAB
ABO/RH: NORMAL
ANION GAP SERPL CALCULATED.3IONS-SCNC: 25 MMOL/L (ref 3–16)
ANTIBODY SCREEN: NORMAL
BASOPHILS ABSOLUTE: 0.1 K/UL (ref 0–0.2)
BASOPHILS RELATIVE PERCENT: 0.9 %
BLOOD BANK DISPENSE STATUS: NORMAL
BLOOD BANK PRODUCT CODE: NORMAL
BPU ID: NORMAL
BUN BLDV-MCNC: 98 MG/DL (ref 7–20)
CALCIUM SERPL-MCNC: 6.7 MG/DL (ref 8.3–10.6)
CHLORIDE BLD-SCNC: 96 MMOL/L (ref 99–110)
CO2: 17 MMOL/L (ref 21–32)
CREAT SERPL-MCNC: 11 MG/DL (ref 0.8–1.3)
DESCRIPTION BLOOD BANK: NORMAL
EOSINOPHILS ABSOLUTE: 0.1 K/UL (ref 0–0.6)
EOSINOPHILS RELATIVE PERCENT: 0.9 %
GFR AFRICAN AMERICAN: 5
GFR NON-AFRICAN AMERICAN: 4
GLUCOSE BLD-MCNC: 126 MG/DL (ref 70–99)
GLUCOSE BLD-MCNC: 138 MG/DL (ref 70–99)
GLUCOSE BLD-MCNC: 156 MG/DL (ref 70–99)
GLUCOSE BLD-MCNC: 165 MG/DL (ref 70–99)
GLUCOSE BLD-MCNC: 206 MG/DL (ref 70–99)
HCT VFR BLD CALC: 18.5 % (ref 40.5–52.5)
HEMOGLOBIN: 6.2 G/DL (ref 13.5–17.5)
LYMPHOCYTES ABSOLUTE: 0.4 K/UL (ref 1–5.1)
LYMPHOCYTES RELATIVE PERCENT: 5.7 %
MAGNESIUM: 2.5 MG/DL (ref 1.8–2.4)
MCH RBC QN AUTO: 35.3 PG (ref 26–34)
MCHC RBC AUTO-ENTMCNC: 33.5 G/DL (ref 31–36)
MCV RBC AUTO: 105.2 FL (ref 80–100)
MONOCYTES ABSOLUTE: 0.8 K/UL (ref 0–1.3)
MONOCYTES RELATIVE PERCENT: 10.6 %
NEUTROPHILS ABSOLUTE: 6.2 K/UL (ref 1.7–7.7)
NEUTROPHILS RELATIVE PERCENT: 81.9 %
PARATHYROID HORMONE INTACT: 238.3 PG/ML (ref 14–72)
PDW BLD-RTO: 19 % (ref 12.4–15.4)
PERFORMED ON: ABNORMAL
PHOSPHORUS: 8.4 MG/DL (ref 2.5–4.9)
PLATELET # BLD: 332 K/UL (ref 135–450)
PMV BLD AUTO: 7.3 FL (ref 5–10.5)
POTASSIUM SERPL-SCNC: 3.6 MMOL/L (ref 3.5–5.1)
RBC # BLD: 1.75 M/UL (ref 4.2–5.9)
SODIUM BLD-SCNC: 138 MMOL/L (ref 136–145)
VITAMIN D 25-HYDROXY: 38.1 NG/ML
WBC # BLD: 7.6 K/UL (ref 4–11)

## 2022-05-08 PROCEDURE — 2060000000 HC ICU INTERMEDIATE R&B

## 2022-05-08 PROCEDURE — 86901 BLOOD TYPING SEROLOGIC RH(D): CPT

## 2022-05-08 PROCEDURE — 83735 ASSAY OF MAGNESIUM: CPT

## 2022-05-08 PROCEDURE — 2500000003 HC RX 250 WO HCPCS: Performed by: STUDENT IN AN ORGANIZED HEALTH CARE EDUCATION/TRAINING PROGRAM

## 2022-05-08 PROCEDURE — A4722 DIALYS SOL FLD VOL > 1999CC: HCPCS | Performed by: INTERNAL MEDICINE

## 2022-05-08 PROCEDURE — 6370000000 HC RX 637 (ALT 250 FOR IP): Performed by: STUDENT IN AN ORGANIZED HEALTH CARE EDUCATION/TRAINING PROGRAM

## 2022-05-08 PROCEDURE — 2580000003 HC RX 258: Performed by: INTERNAL MEDICINE

## 2022-05-08 PROCEDURE — 83970 ASSAY OF PARATHORMONE: CPT

## 2022-05-08 PROCEDURE — 85025 COMPLETE CBC W/AUTO DIFF WBC: CPT

## 2022-05-08 PROCEDURE — 36430 TRANSFUSION BLD/BLD COMPNT: CPT

## 2022-05-08 PROCEDURE — 86923 COMPATIBILITY TEST ELECTRIC: CPT

## 2022-05-08 PROCEDURE — P9016 RBC LEUKOCYTES REDUCED: HCPCS

## 2022-05-08 PROCEDURE — 2500000003 HC RX 250 WO HCPCS: Performed by: INTERNAL MEDICINE

## 2022-05-08 PROCEDURE — 84100 ASSAY OF PHOSPHORUS: CPT

## 2022-05-08 PROCEDURE — 80048 BASIC METABOLIC PNL TOTAL CA: CPT

## 2022-05-08 PROCEDURE — 86850 RBC ANTIBODY SCREEN: CPT

## 2022-05-08 PROCEDURE — 2700000000 HC OXYGEN THERAPY PER DAY

## 2022-05-08 PROCEDURE — 36415 COLL VENOUS BLD VENIPUNCTURE: CPT

## 2022-05-08 PROCEDURE — 82306 VITAMIN D 25 HYDROXY: CPT

## 2022-05-08 PROCEDURE — 6370000000 HC RX 637 (ALT 250 FOR IP): Performed by: INTERNAL MEDICINE

## 2022-05-08 PROCEDURE — 6360000002 HC RX W HCPCS: Performed by: STUDENT IN AN ORGANIZED HEALTH CARE EDUCATION/TRAINING PROGRAM

## 2022-05-08 PROCEDURE — 94760 N-INVAS EAR/PLS OXIMETRY 1: CPT

## 2022-05-08 PROCEDURE — 86900 BLOOD TYPING SEROLOGIC ABO: CPT

## 2022-05-08 PROCEDURE — 99232 SBSQ HOSP IP/OBS MODERATE 35: CPT | Performed by: INTERNAL MEDICINE

## 2022-05-08 PROCEDURE — 2580000003 HC RX 258: Performed by: STUDENT IN AN ORGANIZED HEALTH CARE EDUCATION/TRAINING PROGRAM

## 2022-05-08 PROCEDURE — 6360000002 HC RX W HCPCS: Performed by: INTERNAL MEDICINE

## 2022-05-08 RX ORDER — CALCIUM ACETATE 667 MG/1
3 CAPSULE ORAL
Status: DISCONTINUED | OUTPATIENT
Start: 2022-05-08 | End: 2022-05-11 | Stop reason: HOSPADM

## 2022-05-08 RX ORDER — SODIUM CHLORIDE 9 MG/ML
INJECTION, SOLUTION INTRAVENOUS PRN
Status: DISCONTINUED | OUTPATIENT
Start: 2022-05-08 | End: 2022-05-11 | Stop reason: HOSPADM

## 2022-05-08 RX ADMIN — SODIUM BICARBONATE 1300 MG: 650 TABLET ORAL at 14:30

## 2022-05-08 RX ADMIN — ASPIRIN 325 MG: 325 TABLET, COATED ORAL at 10:14

## 2022-05-08 RX ADMIN — METOPROLOL TARTRATE 25 MG: 25 TABLET, FILM COATED ORAL at 21:46

## 2022-05-08 RX ADMIN — SODIUM BICARBONATE 1300 MG: 650 TABLET ORAL at 10:14

## 2022-05-08 RX ADMIN — DOXEPIN HYDROCHLORIDE 10 MG: 10 CAPSULE ORAL at 21:47

## 2022-05-08 RX ADMIN — INSULIN LISPRO 2 UNITS: 100 INJECTION, SOLUTION INTRAVENOUS; SUBCUTANEOUS at 10:17

## 2022-05-08 RX ADMIN — METRONIDAZOLE 500 MG: 500 INJECTION, SOLUTION INTRAVENOUS at 16:18

## 2022-05-08 RX ADMIN — HEPARIN SODIUM 5000 UNITS: 5000 INJECTION INTRAVENOUS; SUBCUTANEOUS at 06:38

## 2022-05-08 RX ADMIN — SODIUM CHLORIDE, SODIUM LACTATE, CALCIUM CHLORIDE, MAGNESIUM CHLORIDE AND DEXTROSE 3000 ML: 2.5; 538; 448; 18.3; 5.08 INJECTION, SOLUTION INTRAPERITONEAL at 21:54

## 2022-05-08 RX ADMIN — EPOETIN ALFA-EPBX 20000 UNITS: 20000 INJECTION, SOLUTION INTRAVENOUS; SUBCUTANEOUS at 21:46

## 2022-05-08 RX ADMIN — INSULIN LISPRO 1 UNITS: 100 INJECTION, SOLUTION INTRAVENOUS; SUBCUTANEOUS at 17:30

## 2022-05-08 RX ADMIN — GENTAMICIN SULFATE: 1 CREAM TOPICAL at 10:16

## 2022-05-08 RX ADMIN — SODIUM CHLORIDE, SODIUM LACTATE, CALCIUM CHLORIDE, MAGNESIUM CHLORIDE AND DEXTROSE 6000 ML: 1.5; 538; 448; 18.3; 5.08 INJECTION, SOLUTION INTRAPERITONEAL at 21:54

## 2022-05-08 RX ADMIN — ALLOPURINOL 100 MG: 100 TABLET ORAL at 10:14

## 2022-05-08 RX ADMIN — METRONIDAZOLE 500 MG: 500 INJECTION, SOLUTION INTRAVENOUS at 06:43

## 2022-05-08 RX ADMIN — CEFEPIME HYDROCHLORIDE 1000 MG: 1 INJECTION, POWDER, FOR SOLUTION INTRAMUSCULAR; INTRAVENOUS at 06:42

## 2022-05-08 RX ADMIN — Medication 10 ML: at 21:47

## 2022-05-08 RX ADMIN — CALCIUM ACETATE 1334 MG: 667 CAPSULE ORAL at 10:14

## 2022-05-08 RX ADMIN — METOPROLOL TARTRATE 25 MG: 25 TABLET, FILM COATED ORAL at 10:14

## 2022-05-08 RX ADMIN — SODIUM BICARBONATE 1300 MG: 650 TABLET ORAL at 21:46

## 2022-05-08 RX ADMIN — TAMSULOSIN HYDROCHLORIDE 0.4 MG: 0.4 CAPSULE ORAL at 21:46

## 2022-05-08 ASSESSMENT — PAIN SCALES - GENERAL
PAINLEVEL_OUTOF10: 0

## 2022-05-08 NOTE — PROGRESS NOTES
Critical results received from lab this AM for a hemoglobin of 6.2 and hematocrit of 18.5. Will notify day shift team. STAT type/screen ordered, awaiting lab draw.

## 2022-05-08 NOTE — PLAN OF CARE
Fall risk precautions in place. Bed in lowest position with wheels locked,bed alarm in place and activated,non-skid socks on pt, fall risk ID on pt, call light in reach, will continue to monitor. Skin assessment completed every shift. Pt assessed for incontinence, appropriate barrier cream applied prn. Pt encouraged to turn/rotate every 2 hours. Assistance provided if pt unable to do so themselves. Intake/Output Summary (Last 24 hours) at 5/8/2022 0800  Last data filed at 5/8/2022 0747  Gross per 24 hour   Intake 480 ml   Output 938 ml   Net -458 ml     Vitals:    05/08/22 0708   BP:    Pulse:    Resp:    Temp:    SpO2: 98%     Patient assessed for fall risk; fall precautions initiated. Patient and family instructed about safety devices. Environment kept free of clutter and adequate lighting provided. Bed locked and in lowest position. Call light within reach. Will continue to monitor. Pain/discomfort being managed with PRN analgesics per MD orders. Pt able to express presence and absence of pain and rate pain appropriately using numerical scale.

## 2022-05-08 NOTE — PROGRESS NOTES
Pulmonary Progress Note    Date of Admission: 5/6/2022   LOS: 2 days       CC:  Pleural effusion    Subjective:  shortness of breath improved  Weaned oxygen     ROS:   No nausea  No Vomiting  No chest pain       Assessment:     End-stage renal disease on peritoneal dialysis  Atrial flutter    Plan: This note may have been transcribed using [id number]. Please disregard any translational errors. Hospital Day: 2     Stage IV lung cancer with recurrent right pleural effusion  * agree with pleur-x cath tomorrow.      Acute hypoxemic  * Wean O2 to sat >90%   * weaned to 3.5 L       Future Appointments   Date Time Provider Mateo Argelia   5/9/2022  8:15 AM [place] [facility] RM [address]   5/16/2022 10:20 AM Nicola Tejada MD [address] [place] [address] [facility]   5/25/2022  1:30 PM Randy Anderson MD [facility]          Data:        PHYSICAL EXAM:   Blood pressure (!) 89/47, pulse 83, temperature 98.5 °F (36.9 °C), temperature source Axillary, resp. rate 15, height 5' 11\" (1.803 m), weight 167 lb 8.8 oz (76 kg), SpO2 99 %.'  Body mass index is 23.37 kg/m². Gen: No distress. ENT:   Resp: No accessory muscle use. No crackles. No wheezes. No rhonchi. CV: Regular rate. Regular rhythm. No murmur or rub. No edema. Skin: Warm, dry, normal texture and turgor. No nodule on exposed extremities. M/S: No cyanosis. No clubbing. No joint deformity. Psych: Oriented x 3. No anxiety. Awake. Alert. Intact judgement and insight. Good Mood / Affect.   Memory appears in tact       Medications:    Scheduled Meds:   sodium bicarbonate  1,300 mg Oral TID    aspirin  325 mg Oral Daily    allopurinol  100 mg Oral Daily    doxepin  10 mg Oral Nightly    metoprolol tartrate  25 mg Oral BID    tamsulosin  0.4 mg Oral Nightly    sodium chloride flush  5-40 mL IntraVENous 2 times per day    heparin (porcine)  5,000 Units SubCUTAneous 3 times per day    metroNIDAZOLE  500 mg IntraVENous Q8H    insulin lispro 0-6 Units SubCUTAneous TID WC    insulin lispro  0-3 Units SubCUTAneous Nightly    cefepime  1,000 mg IntraVENous Q24H    dianeal lo-chris 1.5%  6,000 mL IntraPERitoneal Nightly    dianeal lo-chris 2.5%  3,000 mL IntraPERitoneal Nightly    gentamicin   Topical Daily    calcium acetate  2 capsule Oral TID WC       Continuous Infusions:   sodium chloride      sodium chloride      sodium chloride      dextrose         PRN Meds:  sodium chloride, sodium chloride, sodium chloride flush, sodium chloride, acetaminophen **OR** acetaminophen, glucose, dextrose, glucagon (rDNA), dextrose    Labs reviewed:  CBC:   Recent Labs     05/06/22  0128 05/07/22 0622 05/08/22 0623   WBC 9.0 9.1 7.6   HGB 7.2* 6.7* 6.2*   HCT 22.1* 20.0* 18.5*   .4* 104.8* 105.2*    343 332     BMP:   Recent Labs     05/06/22  0336 05/07/22 0622 05/08/22 0623   * 139 138   K 4.3 3.5 3.6   CL 90* 95* 96*   CO2 15* 14* 17*   PHOS  --   --  8.4*   * 103* 98*   CREATININE 13.1* 11.8* 11.0*     LIVER PROFILE:   Recent Labs     05/06/22 0336   AST 27   ALT 10   LIPASE 51.0   BILIDIR <0.2   BILITOT <0.2   ALKPHOS 101     PT/INR: No results for input(s): PROTIME, INR in the last 72 hours. APTT: No results for input(s): APTT in the last 72 hours. UA:No results for input(s): NITRITE, COLORU, PHUR, LABCAST, WBCUA, RBCUA, MUCUS, TRICHOMONAS, YEAST, BACTERIA, CLARITYU, SPECGRAV, LEUKOCYTESUR, UROBILINOGEN, BILIRUBINUR, BLOODU, GLUCOSEU, AMORPHOUS in the last 72 hours. Invalid input(s): Flora Sidshreyas  No results for input(s): PH, PCO2, PO2 in the last 72 hours. Cx:      Films: This note was transcribed using [id number] [facility]. Please disregard any translational errors.       Chato Zambrano [facility] [address] [phone]

## 2022-05-08 NOTE — PROGRESS NOTES
[facility]  Phone: [phone]  Fax: [phone]  [email or web address] [facility]      Trace Ramirez is a 78 y.o. male whom we were asked to see for ESRD management. He was diagnosed with RUL lung cancer. He presents with AMS and worsening fatigue x 1 week. He has poor PO intake. He his dialysis adequacy has been low (fairly significantly). Interval History:  -No acute events overnight  -PD in progress      ROS: No active complaints. All pertinent ROS negative. SHx: No visitors present at bedside      Physical Exam:  Blood pressure (!) 105/56, pulse 83, temperature 98.7 °F (37.1 °C), resp. rate 15, height 5' 11\" (1.803 m), weight 167 lb 8.8 oz (76 kg), SpO2 99 %. Gen: NAD, Frail appearing  HEENT: Sclera anicteric, MMM  Neck: Supple. No JVD  CV: RRR, S1/S2  Pulm: CTAB/L  Abd: Soft, NT, ND, (+) PD catheter in LMQ  Ext: 1+ pitting edema in B/L LE  Neuro: Awake/Alert, Answers questions appropriately  Skin: Warm.  No significant visible rashes appreciated      Laboratory Studies:  Lab Results   Component Value Date/Time     05/08/2022 06:23 AM    K 3.6 05/08/2022 06:23 AM    K 4.3 05/06/2022 03:36 AM    CL 96 (L) 05/08/2022 06:23 AM    CO2 17 (L) 05/08/2022 06:23 AM    BUN 98 (HH) 05/08/2022 06:23 AM    CREATININE 11.0 (HH) 05/08/2022 06:23 AM    CALCIUM 6.7 (L) 05/08/2022 06:23 AM    PHOS 8.4 (H) 05/08/2022 06:23 AM    MG 2.50 (H) 05/08/2022 06:23 AM     Lab Results   Component Value Date/Time    WBC 7.6 05/08/2022 06:23 AM    HGB 6.2 (LL) 05/08/2022 06:23 AM    HCT 18.5 (LL) 05/08/2022 06:23 AM     05/08/2022 06:23 AM       Assessment/Plan:    1) ESRD on PD  -Dr. Jules Duarte discussed with PDU and there were concerns for inadequate PD (vs. poor collection)  -PD regimen adjusted by Dr. Jules Duarte  -BUN/Cr improving  -Continue new PD regimen for now    2) AMS - mildly confused with concerns for uremia from inadequate dialysis  -More awake/alert today  -New PD Rx as above  -Management per Primary Team    3) metastatic lung cancer - per family, some details regarding specifics of diagnosis are unknown at this time - he has not met with [facility] in office yet  -Possible PleurX catheter tomorrow  -Management per Primary Team    4) failure to thrive  -Management per Primary Team    5) FEN  - metabolic acidosis - improving with increase in sodium bicarb to 1300mg PO TID  - hyponatremia - improving with new PD regimen    6) Hypocalcemia:  -Vit D adequate  -Awaiting PTH level  -Corrected Ca++: 8.3  -Increase Phoslo as below    7) Hyperphosphatemia:  -Uncontrolled  -On Phoslo.  Increase to 3 capsules PO TID with meals    8) anemia  -Below goal and trending down  -Give Epo 20K units SQ x1 today  -Recommend pRBC transfusion    9) ID  - on empiric flagyl ,cefepime, and vancomycin  -Management per Primary Team

## 2022-05-08 NOTE — PROGRESS NOTES
Hospitalist Progress Note      PCP: Maria A Reno MD    Date of Admission: 5/6/2022    Chief Complaint: anemia    Hospital Course:      Subjective: somnulent and more uremic appearing today       Medications:  Reviewed    Infusion Medications    sodium chloride      sodium chloride      sodium chloride      dextrose       Scheduled Medications    calcium acetate  3 capsule Oral TID WC    epoetin purvi-epbx  20,000 Units SubCUTAneous Once    sodium bicarbonate  1,300 mg Oral TID    aspirin  325 mg Oral Daily    allopurinol  100 mg Oral Daily    doxepin  10 mg Oral Nightly    metoprolol tartrate  25 mg Oral BID    tamsulosin  0.4 mg Oral Nightly    sodium chloride flush  5-40 mL IntraVENous 2 times per day    [Held by provider] heparin (porcine)  5,000 Units SubCUTAneous 3 times per day    metroNIDAZOLE  500 mg IntraVENous Q8H    insulin lispro  0-6 Units SubCUTAneous TID WC    insulin lispro  0-3 Units SubCUTAneous Nightly    cefepime  1,000 mg IntraVENous Q24H    dianeal lo-chris 1.5%  6,000 mL IntraPERitoneal Nightly    dianeal lo-chris 2.5%  3,000 mL IntraPERitoneal Nightly    gentamicin   Topical Daily     PRN Meds: sodium chloride, sodium chloride, sodium chloride flush, sodium chloride, acetaminophen **OR** acetaminophen, glucose, dextrose, glucagon (rDNA), dextrose      Intake/Output Summary (Last 24 hours) at 5/8/2022 1816  Last data filed at 5/8/2022 1546  Gross per 24 hour   Intake 708 ml   Output 938 ml   Net -230 ml       Exam:    BP (!) 105/56   Pulse 83   Temp 98.7 °F (37.1 °C)   Resp 15   Ht 5' 11\" (1.803 m)   Wt 169 lb 15.6 oz (77.1 kg)   SpO2 99%   BMI 23.71 kg/m²     General appearance: No apparent distress, appears stated age and cooperative. HEENT: Pupils equal, round, and reactive to light. Conjunctivae/corneas clear. Neck: Supple, with full range of motion. No jugular venous distention. Trachea midline. Respiratory:  Normal respiratory effort.  Clear to auscultation, bilaterally without Rales/Wheezes/Rhonchi. Cardiovascular: Regular rate and rhythm with normal S1/S2 without murmurs, rubs or gallops. Abdomen: Soft, non-tender, non-distended with normal bowel sounds. Musculoskeletal: No clubbing, cyanosis or edema bilaterally. Full range of motion without deformity. Skin: Skin color, texture, turgor normal.  No rashes or lesions. Neurologic:  Neurovascularly intact without any focal sensory/motor deficits. Cranial nerves: II-XII intact, grossly non-focal.  Psychiatric: Alert and oriented, thought content appropriate, normal insight  Capillary Refill: Brisk,< 3 seconds   Peripheral Pulses: +2 palpable, equal bilaterally       Labs:   Recent Labs     05/06/22  0128 05/07/22 0622 05/08/22 0623   WBC 9.0 9.1 7.6   HGB 7.2* 6.7* 6.2*   HCT 22.1* 20.0* 18.5*    343 332     Recent Labs     05/06/22  0336 05/07/22 0622 05/08/22 0623   * 139 138   K 4.3 3.5 3.6   CL 90* 95* 96*   CO2 15* 14* 17*   * 103* 98*   CREATININE 13.1* 11.8* 11.0*   CALCIUM 6.8* 7.4* 6.7*   PHOS  --   --  8.4*     Recent Labs     05/06/22 0336   AST 27   ALT 10   BILIDIR <0.2   BILITOT <0.2   ALKPHOS 101     No results for input(s): INR in the last 72 hours. Recent Labs     05/06/22  0336   CKTOTAL 68       Urinalysis:    No results found for: [place], BACTERIA, RBCUA, BLOODU, SPECGRAV, GLUCOSEU    Radiology:  CT CHEST ABDOMEN PELVIS WO CONTRAST   Final Result   1. Large area of right upper lobe dense consolidation with subtle areas of   calcific density seen which may represent a central right upper lobe neoplasm   and postobstructive consolidation. 2. Similar appearing pathologic mediastinal lymphadenopathy with subtle   calcification noted as well. 3. Small right-sided pleural effusion status post partial evacuation from   recent chest tube placement with a small amount of gas remaining. Loculated   appearing pleural effusion remains.    4. Patchy areas of consolidation and reticulonodular change seen within the   right lung suspicious for multifocal pneumonia. 5. There is a 17 mm pulmonary nodule in the right lower lobe concerning for   possible metastatic disease. No left-sided pulmonary nodules are identified. 6. Prominent mucosal folds seen within a few loops of jejunum in the left   abdomen with some interloop fluid, which can be seen in the setting of   enteritis though the interloop fluid may be secondary to the indwelling   peritoneal dialysis catheter and small volume upper abdominal and pelvic   ascites. 7. Cholelithiasis without findings to suggest cholecystitis. 8. No definite metastatic disease seen in the abdomen or pelvis. CT HEAD WO CONTRAST   Final Result   No acute intracranial abnormality. IR GUIDED PERC PLEURAL DRAIN W CATH INSERT    (Results Pending)           Assessment/Plan:    Active Hospital Problems    Diagnosis Date Noted    Pneumonia [J18.9] 05/06/2022     Priority: Medium    Malignancy ([address] [C80.1] 05/06/2022     Priority: Medium    Sepsis ([address] [A41.9] 05/06/2022     Priority: Medium    Uremia [N19] 05/06/2022     Priority: Medium    ESRD (end stage renal disease) ([address] [N18.6] 05/06/2022     Priority: Medium    DM2 (diabetes mellitus, type 2) ([address] [E11.9] 05/06/2022     Priority: Medium    Severe malnutrition ([address] [E43] 05/06/2022     Priority: Medium     AMS:  Wax and waining  - improvement due to improved hypoxia  - still could be from uremia   - nephro managing  - antibiotics were started empirically on admission but no signs of local infection --> no source of infection identified, d/c on 5/8    Recurrent right pleural Effusion  Acute hypoxemia  - last thoracentesis was in April  - Pleurx catheter to be placed on Monday  - wean oxygen as tolerated    Metastatic Lung Cancer: Outpatient ongoing surveillance    ESRD on PD    DVT Prophylaxis: heparin  Diet: Diet NPO  ADULT DIET;  Regular; 4 carb choices (60 gm/meal); Low Sodium (2 gm); Low Potassium (Less than 3000 mg/day);  Low Phosphorus (Less than 1000 mg); 1500 ml  ADULT ORAL NUTRITION SUPPLEMENT; Breakfast, Dinner; Diabetic Oral Supplement  Code Status: Full Code    PT/OT Eval Status: n/a    Dispo - PCU    Yanira Frazier MD

## 2022-05-09 ENCOUNTER — APPOINTMENT (OUTPATIENT)
Dept: INTERVENTIONAL RADIOLOGY/VASCULAR | Age: 82
DRG: 180 | End: 2022-05-09
Payer: MEDICARE

## 2022-05-09 ENCOUNTER — APPOINTMENT (OUTPATIENT)
Dept: GENERAL RADIOLOGY | Age: 82
DRG: 180 | End: 2022-05-09
Payer: MEDICARE

## 2022-05-09 LAB
ANION GAP SERPL CALCULATED.3IONS-SCNC: 24 MMOL/L (ref 3–16)
BASOPHILS ABSOLUTE: 0.1 K/UL (ref 0–0.2)
BASOPHILS RELATIVE PERCENT: 1.9 %
BUN BLDV-MCNC: 95 MG/DL (ref 7–20)
CALCIUM SERPL-MCNC: 6.6 MG/DL (ref 8.3–10.6)
CHLORIDE BLD-SCNC: 94 MMOL/L (ref 99–110)
CO2: 19 MMOL/L (ref 21–32)
CREAT SERPL-MCNC: 11.2 MG/DL (ref 0.8–1.3)
EOSINOPHILS ABSOLUTE: 0.1 K/UL (ref 0–0.6)
EOSINOPHILS RELATIVE PERCENT: 0.7 %
GFR AFRICAN AMERICAN: 5
GFR NON-AFRICAN AMERICAN: 4
GLUCOSE BLD-MCNC: 125 MG/DL (ref 70–99)
GLUCOSE BLD-MCNC: 130 MG/DL (ref 70–99)
GLUCOSE BLD-MCNC: 163 MG/DL (ref 70–99)
GLUCOSE BLD-MCNC: 183 MG/DL (ref 70–99)
GLUCOSE BLD-MCNC: 208 MG/DL (ref 70–99)
HCT VFR BLD CALC: 22.2 % (ref 40.5–52.5)
HEMOGLOBIN: 7.5 G/DL (ref 13.5–17.5)
LYMPHOCYTES ABSOLUTE: 0.2 K/UL (ref 1–5.1)
LYMPHOCYTES RELATIVE PERCENT: 3.1 %
MAGNESIUM: 2.4 MG/DL (ref 1.8–2.4)
MCH RBC QN AUTO: 34.6 PG (ref 26–34)
MCHC RBC AUTO-ENTMCNC: 33.7 G/DL (ref 31–36)
MCV RBC AUTO: 102.7 FL (ref 80–100)
MONOCYTES ABSOLUTE: 0.6 K/UL (ref 0–1.3)
MONOCYTES RELATIVE PERCENT: 8.4 %
NEUTROPHILS ABSOLUTE: 6.6 K/UL (ref 1.7–7.7)
NEUTROPHILS RELATIVE PERCENT: 85.9 %
PDW BLD-RTO: 19.7 % (ref 12.4–15.4)
PERFORMED ON: ABNORMAL
PLATELET # BLD: 335 K/UL (ref 135–450)
PMV BLD AUTO: 7.8 FL (ref 5–10.5)
POTASSIUM SERPL-SCNC: 3.5 MMOL/L (ref 3.5–5.1)
RBC # BLD: 2.16 M/UL (ref 4.2–5.9)
SODIUM BLD-SCNC: 137 MMOL/L (ref 136–145)
WBC # BLD: 7.7 K/UL (ref 4–11)

## 2022-05-09 PROCEDURE — 6370000000 HC RX 637 (ALT 250 FOR IP): Performed by: STUDENT IN AN ORGANIZED HEALTH CARE EDUCATION/TRAINING PROGRAM

## 2022-05-09 PROCEDURE — 71045 X-RAY EXAM CHEST 1 VIEW: CPT

## 2022-05-09 PROCEDURE — 87070 CULTURE OTHR SPECIMN AEROBIC: CPT

## 2022-05-09 PROCEDURE — 83735 ASSAY OF MAGNESIUM: CPT

## 2022-05-09 PROCEDURE — 2500000003 HC RX 250 WO HCPCS: Performed by: INTERNAL MEDICINE

## 2022-05-09 PROCEDURE — 6370000000 HC RX 637 (ALT 250 FOR IP): Performed by: INTERNAL MEDICINE

## 2022-05-09 PROCEDURE — 32555 ASPIRATE PLEURA W/ IMAGING: CPT

## 2022-05-09 PROCEDURE — 2580000003 HC RX 258: Performed by: INTERNAL MEDICINE

## 2022-05-09 PROCEDURE — 85025 COMPLETE CBC W/AUTO DIFF WBC: CPT

## 2022-05-09 PROCEDURE — 80048 BASIC METABOLIC PNL TOTAL CA: CPT

## 2022-05-09 PROCEDURE — 90945 DIALYSIS ONE EVALUATION: CPT

## 2022-05-09 PROCEDURE — A4722 DIALYS SOL FLD VOL > 1999CC: HCPCS | Performed by: INTERNAL MEDICINE

## 2022-05-09 PROCEDURE — 2709999900 IR GUIDED THORACENTESIS PLEURAL

## 2022-05-09 PROCEDURE — 2060000000 HC ICU INTERMEDIATE R&B

## 2022-05-09 PROCEDURE — 2580000003 HC RX 258: Performed by: STUDENT IN AN ORGANIZED HEALTH CARE EDUCATION/TRAINING PROGRAM

## 2022-05-09 PROCEDURE — 0W993ZZ DRAINAGE OF RIGHT PLEURAL CAVITY, PERCUTANEOUS APPROACH: ICD-10-PCS | Performed by: INTERNAL MEDICINE

## 2022-05-09 PROCEDURE — 2500000003 HC RX 250 WO HCPCS

## 2022-05-09 PROCEDURE — 87205 SMEAR GRAM STAIN: CPT

## 2022-05-09 PROCEDURE — 2700000000 HC OXYGEN THERAPY PER DAY

## 2022-05-09 PROCEDURE — 36415 COLL VENOUS BLD VENIPUNCTURE: CPT

## 2022-05-09 PROCEDURE — 99233 SBSQ HOSP IP/OBS HIGH 50: CPT | Performed by: INTERNAL MEDICINE

## 2022-05-09 PROCEDURE — 94761 N-INVAS EAR/PLS OXIMETRY MLT: CPT

## 2022-05-09 RX ADMIN — TAMSULOSIN HYDROCHLORIDE 0.4 MG: 0.4 CAPSULE ORAL at 21:01

## 2022-05-09 RX ADMIN — Medication 10 ML: at 14:01

## 2022-05-09 RX ADMIN — DOXEPIN HYDROCHLORIDE 10 MG: 10 CAPSULE ORAL at 21:09

## 2022-05-09 RX ADMIN — INSULIN LISPRO 1 UNITS: 100 INJECTION, SOLUTION INTRAVENOUS; SUBCUTANEOUS at 21:01

## 2022-05-09 RX ADMIN — METOPROLOL TARTRATE 25 MG: 25 TABLET, FILM COATED ORAL at 21:01

## 2022-05-09 RX ADMIN — ASPIRIN 325 MG: 325 TABLET, COATED ORAL at 10:05

## 2022-05-09 RX ADMIN — Medication 10 ML: at 21:02

## 2022-05-09 RX ADMIN — METOPROLOL TARTRATE 25 MG: 25 TABLET, FILM COATED ORAL at 10:05

## 2022-05-09 RX ADMIN — CALCIUM ACETATE 2001 MG: 667 CAPSULE ORAL at 17:18

## 2022-05-09 RX ADMIN — CALCIUM ACETATE 2001 MG: 667 CAPSULE ORAL at 13:55

## 2022-05-09 RX ADMIN — SODIUM CHLORIDE, SODIUM LACTATE, CALCIUM CHLORIDE, MAGNESIUM CHLORIDE AND DEXTROSE 6000 ML: 1.5; 538; 448; 18.3; 5.08 INJECTION, SOLUTION INTRAPERITONEAL at 21:42

## 2022-05-09 RX ADMIN — SODIUM BICARBONATE 1300 MG: 650 TABLET ORAL at 21:01

## 2022-05-09 RX ADMIN — ALLOPURINOL 100 MG: 100 TABLET ORAL at 10:05

## 2022-05-09 RX ADMIN — SODIUM BICARBONATE 1300 MG: 650 TABLET ORAL at 10:05

## 2022-05-09 RX ADMIN — GENTAMICIN SULFATE: 1 CREAM TOPICAL at 14:01

## 2022-05-09 RX ADMIN — SODIUM BICARBONATE 1300 MG: 650 TABLET ORAL at 13:55

## 2022-05-09 RX ADMIN — SODIUM CHLORIDE, SODIUM LACTATE, CALCIUM CHLORIDE, MAGNESIUM CHLORIDE AND DEXTROSE 3000 ML: 2.5; 538; 448; 18.3; 5.08 INJECTION, SOLUTION INTRAPERITONEAL at 21:42

## 2022-05-09 ASSESSMENT — PAIN SCALES - GENERAL
PAINLEVEL_OUTOF10: 0
PAINLEVEL_OUTOF10: 0

## 2022-05-09 NOTE — PROGRESS NOTES
[facility]  Phone: [phone]  Fax: [phone]  [email or web address]. com      Lasha Jara is a 78 y.o. male whom we were asked to see for ESRD management. He was diagnosed with RUL lung cancer. He presents with AMS and worsening fatigue x 1 week. He has poor PO intake. He his dialysis adequacy has been low (fairly significantly). Interval History:  -No acute events overnight    HPI: Breathing comfortably. Asleep. Attempt of Pleurx catheter this AM was unsuccessful. ROS:  In bed. No fever. [address]:  medications reviewed. Physical Exam:  Blood pressure (!) 115/56, pulse 90, temperature 98.1 °F (36.7 °C), temperature source Oral, resp. rate 15, height 5' 11\" (1.803 m), weight 169 lb 15.6 oz (77.1 kg), SpO2 100 %. Gen: NAD, Frail appearing  HEENT: Sclera anicteric, MMM  Neck: Supple. No JVD  CV: RRR, S1/S2  Pulm: CTAB/L  Abd: Soft, NT, ND, (+) PD catheter in LMQ  Ext: 1+ pitting edema in B/L LE  Neuro: Awake/Alert, Answers questions appropriately  Skin: Warm. No significant visible rashes appreciated      Laboratory Studies:  Lab Results   Component Value Date/Time     05/09/2022 05:54 AM    K 3.5 05/09/2022 05:54 AM    K 4.3 05/06/2022 03:36 AM    CL 94 (L) 05/09/2022 05:54 AM    CO2 19 (L) 05/09/2022 05:54 AM    BUN 95 (HH) 05/09/2022 05:54 AM    CREATININE 11.2 (HH) 05/09/2022 05:54 AM    CALCIUM 6.6 (L) 05/09/2022 05:54 AM    PHOS 8.4 (H) 05/08/2022 06:23 AM    MG 2.40 05/09/2022 05:54 AM     Lab Results   Component Value Date/Time    WBC 7.7 05/09/2022 05:54 AM    HGB 7.5 (L) 05/09/2022 05:54 AM    HCT 22.2 (L) 05/09/2022 05:54 AM     05/09/2022 05:54 AM       Assessment/Plan:    1) ESRD on PD  -Dr. Kevin Salamanca discussed with PDU and there were concerns for inadequate PD (vs. poor collection)  -PD regimen adjusted by Dr. Kevin Salamanca  -BUN/Cr improving  -will do PD tonight, but since patient is going to hospice, we will plan to stop PD tomorrow.     2) AMS   - mildly confused with concerns for uremia from inadequate dialysis  -New PD Rx as above  -Management per Primary Team    3) metastatic lung cancer - per family, some details regarding specifics of diagnosis are unknown at this time - he has not met with [facility] in office yet  -failed PleurX catheter with loculated effusion  -Management per Primary Team    4) failure to thrive  -Management per Primary Team    5) FEN  - metabolic acidosis - improving with increase in sodium bicarb to 1300mg PO TID  - hyponatremia - improving with new PD regimen    6) Hypocalcemia:  -Vit D adequate  -Awaiting PTH level  -Corrected Ca++: 8.3  -Increase Phoslo as below    7) Hyperphosphatemia:  -Uncontrolled  -On Phoslo.  Increase to 3 capsules PO TID with meals    8) anemia  -Below goal and trending down  -Give Epo 20K units SQ x1 today  -s/p pRBC transfusion    9) ID  - on empiric flagyl ,cefepime, and vancomycin  - Management per Primary Team

## 2022-05-09 NOTE — CARE COORDINATION
Spoke to family, after patient/family discussion with pulmonologist. They would like to take patient home with hospice. Requested hospice consult from Dr Veronica Davey. Provided hospice options to daughter in law Jocelyn Pizarro, family would like a referral to [address] [place] [address]. Referral made. Electronically signed by Misha Ortega RN Case Management [phone] on 5/9/2022 at 2:21 PM    The Plan for Transition of Care is related to the following treatment goals: hospice    The patient was provided with a choice of provider and agrees   with the discharge plan. [x] Yes [] No    Freedom of choice list was provided with basic dialogue that supports the patient's individualized plan of care/goals, treatment preferences and shares the quality data associated with the providers.  [x] Yes [] No

## 2022-05-09 NOTE — PLAN OF CARE
infection at discharge  Outcome: Progressing  Flowsheets (Taken 5/8/2022 2345)  Absence of infection at discharge:   Assess and monitor for signs and symptoms of infection   Monitor lab/diagnostic results   Administer medications as ordered  Goal: Absence of infection during hospitalization  Outcome: Progressing  Flowsheets (Taken 5/8/2022 2345)  Absence of infection during hospitalization:   Assess and monitor for signs and symptoms of infection   Monitor lab/diagnostic results   Administer medications as ordered     Problem: Metabolic/Fluid and Electrolytes - Adult  Goal: Hemodynamic stability and optimal renal function maintained  Outcome: Progressing  Flowsheets (Taken 5/8/2022 2345)  Hemodynamic stability and optimal renal function maintained:   Monitor labs and assess for signs and symptoms of volume excess or deficit   Monitor intake, output and patient weight   Monitor response to interventions for patient's volume status, including labs, urine output, blood pressure (other measures as available)  Goal: Glucose maintained within prescribed range  Outcome: Progressing  Flowsheets (Taken 5/8/2022 2345)  Glucose maintained within prescribed range:   Monitor blood glucose as ordered   Assess for signs and symptoms of hyperglycemia and hypoglycemia   Administer ordered medications to maintain glucose within target range   Assess barriers to adequate nutritional intake and initiate nutrition consult as needed     Problem: Hematologic - Adult  Goal: Maintains hematologic stability  Outcome: Progressing  Flowsheets (Taken 5/8/2022 2345)  Maintains hematologic stability:   Assess for signs and symptoms of bleeding or hemorrhage   Monitor labs for bleeding or clotting disorders   Administer blood products/factors as ordered

## 2022-05-09 NOTE — PROGRESS NOTES
Physician Progress Note      PATIENT:               Sun Espinoza  CSN #:                  [id number]  :                       1940  ADMIT DATE:       2022 12:56 AM  DISCH DATE:  RESPONDING  PROVIDER #:        Bear Overton MD          QUERY TEXT:    Dear Dr. Juanita Caldera,  Patient admitted with Sepsis and pneumonia. Noted documentation of sepsis in H   and P and has no fever or elevated WBC. In order to support the diagnosis of   sepsis, please include additional clinical indicators in your documentation. Or please document if the diagnosis of sepsis has been ruled out after further   study    The medical record reflects the following:  Risk Factors: Hx ESRD on PD, A-flutter, DM, Lung cancer, current pneumonia  Clinical Indicators:  ED for fatigue and  extremity weakness, noted appears   altered, confused, Temp=97.5, EA=281, WBC=9. 0(WNL) Neutrophils=7.8, noted to   have multifocal pneumonia and abx started, H and P notes Sepsis  Treatment:  Vanc, Cefpime, Flagyl  Thank you,  Ronald Dutta RN, [email or web address] [email or web address]  Options provided:  -- Sepsis was ruled out after study  -- Sepsis present as evidenced by, Please document evidence. -- Other - I will add my own diagnosis  -- Disagree - Not applicable / Not valid  -- Disagree - Clinically unable to determine / Unknown  -- Refer to Clinical Documentation Reviewer    PROVIDER RESPONSE TEXT:    Sepsis was ruled out after study. Query created by: [facility] on 2022 10:59 AM      QUERY TEXT:    Dear Dr. Juanita Caldera,  Pt admitted with Pneumonia. Pt noted to have treatment with Vanc, Cefepime,   Flagyl . If possible, please document in the progress notes and discharge   summary if you are evaluating and/or treating any of the following:      Note: CAP and HCAP indicate where the pneumonia was acquired, not a specific   type.     The medical record reflects the following:  Risk Factors: Hx ESRD on PD, DM,  Lung cancer diagnosed a month ago and was consults  Thank you,  Joanie Linares RN, [email or web address]  Options provided:  -- Metabolic encephalopathy  -- Toxic encephalopathy  -- Other - I will add my own diagnosis  -- Disagree - Not applicable / Not valid  -- Disagree - Clinically unable to determine / Unknown  -- Refer to Clinical Documentation Reviewer    PROVIDER RESPONSE TEXT:    This patient has metabolic encephalopathy. Query created by:  [id number] on 5/6/2022 11:16 AM      Electronically signed by:  Arcelia De Los Santos MD 5/8/2022 9:19 PM

## 2022-05-09 NOTE — PROGRESS NOTES
Hospitalist Progress Note      PCP: Giorgi Platt MD    Date of Admission: 5/6/2022    Chief Complaint: anemia    Hospital Course:    Krish Jewell is a 78 y.o. male with a history of ESRD on peritoneal dialysis, hypertension, and was diagnosed with right upper lobe lung cancer. He had a pleural effusion that was drained with a chest tube 3 weeks ago. He is admitted for worsening altered mental status and fatigue for a week. He has no recollection of the recent admission but has been getting increasingly short of breath. Associated with chills and decreased appetite. He has been consistent and always doing his peritoneal dialysis. No chest pain, fever, chills, dizziness, syncope, dysuria, blood in urine/stool/sputum, rashes. Although patient seems to be more alert and oriented, difficult to trust him as a historian and no bedside family is available for further information or confirmation of story. Subjective:   Pt says he is feeling better . Denies chest pain . No nausea or vomiting . Denies fevers .        Medications:  Reviewed    Infusion Medications    sodium chloride      sodium chloride      sodium chloride      dextrose       Scheduled Medications    calcium acetate  3 capsule Oral TID WC    sodium bicarbonate  1,300 mg Oral TID    aspirin  325 mg Oral Daily    allopurinol  100 mg Oral Daily    doxepin  10 mg Oral Nightly    metoprolol tartrate  25 mg Oral BID    tamsulosin  0.4 mg Oral Nightly    sodium chloride flush  5-40 mL IntraVENous 2 times per day    [Held by provider] heparin (porcine)  5,000 Units SubCUTAneous 3 times per day    insulin lispro  0-6 Units SubCUTAneous TID WC    insulin lispro  0-3 Units SubCUTAneous Nightly    dianeal lo-chris 1.5%  6,000 mL IntraPERitoneal Nightly    dianeal lo-chris 2.5%  3,000 mL IntraPERitoneal Nightly    gentamicin   Topical Daily     PRN Meds: sodium chloride, sodium chloride, sodium chloride flush, sodium chloride, acetaminophen **OR** acetaminophen, glucose, dextrose, glucagon (rDNA), dextrose      Intake/Output Summary (Last 24 hours) at 5/9/2022 1406  Last data filed at 5/9/2022 0905  Gross per 24 hour   Intake 468 ml   Output 1201 ml   Net -733 ml       Exam:    BP (!) 108/57   Pulse 73   Temp 98.1 °F (36.7 °C) (Oral)   Resp 19   Ht 5' 11\" (1.803 m)   Wt 169 lb 15.6 oz (77.1 kg)   SpO2 100%   BMI 23.71 kg/m²     General appearance: No apparent distress, appears stated age and cooperative. Chronically ill looking. HEENT: Pupils equal, round, and reactive to light. Conjunctivae/corneas clear. Neck: Supple, with full range of motion. No jugular venous distention. Trachea midline. Respiratory:  Normal respiratory effort. Clear to auscultation, bilaterally without Rales/Wheezes/Rhonchi. Cardiovascular: Regular rate and rhythm with normal S1/S2 without murmurs, rubs or gallops. Abdomen: Soft, non-tender, non-distended with normal bowel sounds. Musculoskeletal: No clubbing, cyanosis or edema bilaterally. Full range of motion without deformity. Skin: Skin color, texture, turgor normal.  No rashes or lesions. Neurologic:  Neurovascularly intact without any focal sensory/motor deficits. Cranial nerves: II-XII intact, grossly non-focal.  Psychiatric: Alert and oriented, thought content appropriate, normal insight  Capillary Refill: Brisk,< 3 seconds   Peripheral Pulses: +2 palpable, equal bilaterally       Labs:   Recent Labs     05/07/22  0622 05/08/22  0623 05/09/22  0554   WBC 9.1 7.6 7.7   HGB 6.7* 6.2* 7.5*   HCT 20.0* 18.5* 22.2*    332 335     Recent Labs     05/07/22  0622 05/08/22  0623 05/09/22  0554    138 137   K 3.5 3.6 3.5   CL 95* 96* 94*   CO2 14* 17* 19*   * 98* 95*   CREATININE 11.8* 11.0* 11.2*   CALCIUM 7.4* 6.7* 6.6*   PHOS  --  8.4*  --      No results for input(s): AST, ALT, BILIDIR, BILITOT, ALKPHOS in the last 72 hours.   No results for input(s): INR in the last 72 hours. No results for input(s): Gilles Joseph in the last 72 hours. Urinalysis:    No results found for: Todd Roulette, BACTERIA, RBCUA, BLOODU, [address], [address]    Radiology:  XR CHEST PORTABLE   Final Result   Overall similar right-sided pleural effusion and airspace opacities. No   pneumothorax. IR GUIDED THORACENTESIS PLEURAL   Final Result   Successful ultrasound guided right thoracentesis. CT CHEST ABDOMEN PELVIS WO CONTRAST   Final Result   1. Large area of right upper lobe dense consolidation with subtle areas of   calcific density seen which may represent a central right upper lobe neoplasm   and postobstructive consolidation. 2. Similar appearing pathologic mediastinal lymphadenopathy with subtle   calcification noted as well. 3. Small right-sided pleural effusion status post partial evacuation from   recent chest tube placement with a small amount of gas remaining. Loculated   appearing pleural effusion remains. 4. Patchy areas of consolidation and reticulonodular change seen within the   right lung suspicious for multifocal pneumonia. 5. There is a 17 mm pulmonary nodule in the right lower lobe concerning for   possible metastatic disease. No left-sided pulmonary nodules are identified. 6. Prominent mucosal folds seen within a few loops of jejunum in the left   abdomen with some interloop fluid, which can be seen in the setting of   enteritis though the interloop fluid may be secondary to the indwelling   peritoneal dialysis catheter and small volume upper abdominal and pelvic   ascites. 7. Cholelithiasis without findings to suggest cholecystitis. 8. No definite metastatic disease seen in the abdomen or pelvis. CT HEAD WO CONTRAST   Final Result   No acute intracranial abnormality.                  Assessment/Plan:    Active Hospital Problems    Diagnosis Date Noted    Pneumonia [J18.9] 05/06/2022     Priority: Medium    Malignancy ([address] [C80.1] 05/06/2022     Priority: Medium    Sepsis [facility] [A41.9] 05/06/2022     Priority: Medium    Uremia [N19] 05/06/2022     Priority: Medium    ESRD (end stage renal disease) ([facility] [N18.6] 05/06/2022     Priority: Medium    DM2 (diabetes mellitus, type 2) ([facility] [E11.9] 05/06/2022     Priority: Medium    Severe malnutrition ([facility] [E43] 05/06/2022     Priority: Medium     AMS:  Wax and waining  - improvement due to improved hypoxia  - still could be from uremia   - nephro managing  - antibiotics were started empirically on admission but no signs of local infection --> no source of infection identified, d/c on 5/8    Recurrent right pleural Effusion  Acute hypoxemia  - last thoracentesis was in April  - Pleurx catheter to be placed on Monday  - wean oxygen as tolerated    Metastatic Lung Cancer: Outpatient ongoing surveillance. Patient and family agreed to hospice consult. Okay to be discharged with home hospice tomorrow morning. ESRD on PD    DVT Prophylaxis: heparin  Diet: ADULT DIET; Regular; 4 carb choices (60 gm/meal); Low Sodium (2 gm); Low Potassium (Less than 3000 mg/day);  Low Phosphorus (Less than 1000 mg); 1500 ml  ADULT ORAL NUTRITION SUPPLEMENT; Breakfast, Dinner; Diabetic Oral Supplement  Code Status: Full Code    PT/OT Eval Status: n/a    Dispo - PCU    [facility] MD LEN

## 2022-05-09 NOTE — PROGRESS NOTES
4 Eyes Skin Assessment     NAME:  Arva Scheuermann  YOB: 1940  MEDICAL RECORD NUMBER:  [id number]    The patient is being assess for  Shift Handoff    I agree that 2 RN's have performed a thorough Head to Toe Skin Assessment on the patient. ALL assessment sites listed below have been assessed. Areas assessed by both nurses:    Head, Face, Ears, Shoulders, Back, Chest, Arms, Elbows, Hands, Sacrum. Buttock, Coccyx, Ischium and Legs. Feet and Heels        Does the Patient have a Wound?  No noted wound(s)       [place] Prevention initiated:  Yes   Wound Care Orders initiated:  No    Pressure Injury (Stage 3,4, Unstageable, DTI, NWPT, and Complex wounds) if present place consult order under [de-identified] No    New and Established Ostomies if present place consult order under : No      Nurse 1 eSignature: Electronically signed by Michel Prado RN on 5/9/22 at 6:31 AM EDT    **SHARE this note so that the co-signing nurse is able to place an eSignature**    Nurse 2 eSignature: Electronically signed by Inez Landa RN on 5/9/22 at 6:31 AM EDT

## 2022-05-09 NOTE — PROGRESS NOTES
Pulmonary Progress Note    Date of Admission: 5/6/2022   LOS: 3 days       CC:  Pleural effusion    Subjective: Attempted Pleurx catheter today      ROS:   Drowsy. Woke briefly but not able to give review of systems    Assessment:     End-stage renal disease on peritoneal dialysis  Atrial flutter    Plan: This note may have been transcribed using [id number] [employer]. Please disregard any translational errors. Hospital Day: 3     Stage IV lung cancer with recurrent right pleural effusion  *Attempted to complete Pleurx catheter today. Unfortunately, this was significantly loculated. After discussion with interventional radiology, a thoracentesis was completed with only 200 mL removed. Family was in the room and discussed this. To correct this loculation it would require a chest tube versus surgery. Given his malignancy, tPA dornase would be concerning that it would lead to significant bleeding therefore chest tube without these medications would not likely yield a significantly benefit. Since the Pleurx catheter was mostly palliative, surgery would not achieve the palliative goal.  *Therefore, discussing with family, I advised hospice and oxygen rather than any further procedures for this. They expressed understanding and agreed with this. *Thoracentesis fluid sent for culture.     Acute hypoxemic  * Wean O2 to sat >90%   * weaned to 3.5 L       Future Appointments   Date Time Provider Mateo Argelia   5/16/2022 10:20 AM Emi Ba MD [facility]   5/25/2022  1:30 PM Charley Collado MD [facility]          Data:        PHYSICAL EXAM:   Blood pressure 109/70, pulse 88, temperature 97.8 °F (36.6 °C), temperature source Oral, resp. rate 18, height 5' 11\" (1.803 m), weight 169 lb 15.6 oz (77.1 kg), SpO2 92 %.'  Body mass index is 23.71 kg/m². Gen: No distress. ENT:   Resp: No accessory muscle use. No crackles. No wheezes. No rhonchi. CV: Regular rate. Regular rhythm.  No murmur or rub. No edema. Skin: Warm, dry, normal texture and turgor. No nodule on exposed extremities. M/S: No cyanosis. No clubbing. No joint deformity. Psych: Wakes briefly but sleeping. Medications:    Scheduled Meds:   calcium acetate  3 capsule Oral TID WC    sodium bicarbonate  1,300 mg Oral TID    aspirin  325 mg Oral Daily    allopurinol  100 mg Oral Daily    doxepin  10 mg Oral Nightly    metoprolol tartrate  25 mg Oral BID    tamsulosin  0.4 mg Oral Nightly    sodium chloride flush  5-40 mL IntraVENous 2 times per day    [Held by provider] heparin (porcine)  5,000 Units SubCUTAneous 3 times per day    insulin lispro  0-6 Units SubCUTAneous TID WC    insulin lispro  0-3 Units SubCUTAneous Nightly    dianeal lo-chris 1.5%  6,000 mL IntraPERitoneal Nightly    dianeal lo-chris 2.5%  3,000 mL IntraPERitoneal Nightly    gentamicin   Topical Daily       Continuous Infusions:   sodium chloride      sodium chloride      sodium chloride      dextrose         PRN Meds:  sodium chloride, sodium chloride, sodium chloride flush, sodium chloride, acetaminophen **OR** acetaminophen, glucose, dextrose, glucagon (rDNA), dextrose    Labs reviewed:  CBC:   Recent Labs     05/07/22  0622 05/08/22  0623 05/09/22  0554   WBC 9.1 7.6 7.7   HGB 6.7* 6.2* 7.5*   HCT 20.0* 18.5* 22.2*   .8* 105.2* 102.7*    332 335     BMP:   Recent Labs     05/07/22  0622 05/08/22  0623 05/09/22  0554    138 137   K 3.5 3.6 3.5   CL 95* 96* 94*   CO2 14* 17* 19*   PHOS  --  8.4*  --    * 98* 95*   CREATININE 11.8* 11.0* 11.2*     LIVER PROFILE:   No results for input(s): AST, ALT, LIPASE, BILIDIR, BILITOT, ALKPHOS in the last 72 hours. Invalid input(s): AMYLASE,  ALB  PT/INR: No results for input(s): PROTIME, INR in the last 72 hours. APTT: No results for input(s): APTT in the last 72 hours.   UA:No results for input(s): NITRITE, COLORU, PHUR, LABCAST, WBCUA, RBCUA, MUCUS, TRICHOMONAS, YEAST, BACTERIA, CLARITYU, SPECGRAV, LEUKOCYTESUR, UROBILINOGEN, BILIRUBINUR, BLOODU, GLUCOSEU, AMORPHOUS in the last 72 hours. Invalid input(s): Urbano Overton  No results for input(s): PH, PCO2, PO2 in the last 72 hours. Cx:      Films: This note was transcribed using [id number] [employer]. Please disregard any translational errors.       [facility] [phone]

## 2022-05-09 NOTE — BRIEF OP NOTE
Brief Postoperative Note    Yvonne Smith  YOB: 1940  [id number]    Pre-operative Diagnosis: right pleural effusion    Post-operative Diagnosis: Same    Procedure: US-guided right thoracentesis    Anesthesia: Local    Surgeons: Katty Tsai MD    Estimated Blood Loss: Less than 5 mL    Complications: None    Specimens: Was Obtained: pleural fluid drained and sent for labs    Findings: Successful US-guided right thoracentesis.     Electronically signed by Katty Tsai MD on 5/9/2022 at 8:59 AM

## 2022-05-10 LAB
ANION GAP SERPL CALCULATED.3IONS-SCNC: 25 MMOL/L (ref 3–16)
BASOPHILS ABSOLUTE: 0.1 K/UL (ref 0–0.2)
BASOPHILS RELATIVE PERCENT: 0.7 %
BODY FLUID CULTURE, STERILE: NORMAL
BUN BLDV-MCNC: 81 MG/DL (ref 7–20)
CALCIUM SERPL-MCNC: 7.4 MG/DL (ref 8.3–10.6)
CHLORIDE BLD-SCNC: 94 MMOL/L (ref 99–110)
CO2: 20 MMOL/L (ref 21–32)
CREAT SERPL-MCNC: 11.7 MG/DL (ref 0.8–1.3)
EOSINOPHILS ABSOLUTE: 0.1 K/UL (ref 0–0.6)
EOSINOPHILS RELATIVE PERCENT: 1 %
GFR AFRICAN AMERICAN: 5
GFR NON-AFRICAN AMERICAN: 4
GLUCOSE BLD-MCNC: 132 MG/DL (ref 70–99)
GLUCOSE BLD-MCNC: 141 MG/DL (ref 70–99)
GLUCOSE BLD-MCNC: 142 MG/DL (ref 70–99)
GLUCOSE BLD-MCNC: 152 MG/DL (ref 70–99)
GLUCOSE BLD-MCNC: 180 MG/DL (ref 70–99)
GRAM STAIN RESULT: NORMAL
HCT VFR BLD CALC: 22.8 % (ref 40.5–52.5)
HEMOGLOBIN: 7.6 G/DL (ref 13.5–17.5)
LYMPHOCYTES ABSOLUTE: 0.6 K/UL (ref 1–5.1)
LYMPHOCYTES RELATIVE PERCENT: 6.1 %
MAGNESIUM: 2.4 MG/DL (ref 1.8–2.4)
MCH RBC QN AUTO: 34.2 PG (ref 26–34)
MCHC RBC AUTO-ENTMCNC: 33.3 G/DL (ref 31–36)
MCV RBC AUTO: 102.6 FL (ref 80–100)
MONOCYTES ABSOLUTE: 1.2 K/UL (ref 0–1.3)
MONOCYTES RELATIVE PERCENT: 12.6 %
NEUTROPHILS ABSOLUTE: 7.4 K/UL (ref 1.7–7.7)
NEUTROPHILS RELATIVE PERCENT: 79.6 %
PDW BLD-RTO: 20.1 % (ref 12.4–15.4)
PERFORMED ON: ABNORMAL
PLATELET # BLD: 345 K/UL (ref 135–450)
PMV BLD AUTO: 7.7 FL (ref 5–10.5)
POTASSIUM SERPL-SCNC: 3.6 MMOL/L (ref 3.5–5.1)
RBC # BLD: 2.22 M/UL (ref 4.2–5.9)
SODIUM BLD-SCNC: 139 MMOL/L (ref 136–145)
WBC # BLD: 9.3 K/UL (ref 4–11)

## 2022-05-10 PROCEDURE — 2060000000 HC ICU INTERMEDIATE R&B

## 2022-05-10 PROCEDURE — 6370000000 HC RX 637 (ALT 250 FOR IP): Performed by: STUDENT IN AN ORGANIZED HEALTH CARE EDUCATION/TRAINING PROGRAM

## 2022-05-10 PROCEDURE — 80048 BASIC METABOLIC PNL TOTAL CA: CPT

## 2022-05-10 PROCEDURE — 83735 ASSAY OF MAGNESIUM: CPT

## 2022-05-10 PROCEDURE — 2500000003 HC RX 250 WO HCPCS: Performed by: INTERNAL MEDICINE

## 2022-05-10 PROCEDURE — 90945 DIALYSIS ONE EVALUATION: CPT

## 2022-05-10 PROCEDURE — 94761 N-INVAS EAR/PLS OXIMETRY MLT: CPT

## 2022-05-10 PROCEDURE — 2700000000 HC OXYGEN THERAPY PER DAY

## 2022-05-10 PROCEDURE — 2580000003 HC RX 258: Performed by: STUDENT IN AN ORGANIZED HEALTH CARE EDUCATION/TRAINING PROGRAM

## 2022-05-10 PROCEDURE — 6370000000 HC RX 637 (ALT 250 FOR IP): Performed by: INTERNAL MEDICINE

## 2022-05-10 PROCEDURE — 85025 COMPLETE CBC W/AUTO DIFF WBC: CPT

## 2022-05-10 PROCEDURE — 36415 COLL VENOUS BLD VENIPUNCTURE: CPT

## 2022-05-10 RX ORDER — LORAZEPAM 0.5 MG/1
0.5 TABLET ORAL EVERY 4 HOURS PRN
Qty: 40 TABLET | Refills: 0 | Status: SHIPPED | OUTPATIENT
Start: 2022-05-10 | End: 2022-06-09

## 2022-05-10 RX ORDER — MORPHINE SULFATE 10 MG/.5ML
2.5 SOLUTION ORAL EVERY 4 HOURS PRN
Qty: 30 ML | Refills: 0 | Status: SHIPPED | OUTPATIENT
Start: 2022-05-10 | End: 2022-05-13

## 2022-05-10 RX ADMIN — CALCIUM ACETATE 2001 MG: 667 CAPSULE ORAL at 08:34

## 2022-05-10 RX ADMIN — METOPROLOL TARTRATE 25 MG: 25 TABLET, FILM COATED ORAL at 23:38

## 2022-05-10 RX ADMIN — DOXEPIN HYDROCHLORIDE 10 MG: 10 CAPSULE ORAL at 20:44

## 2022-05-10 RX ADMIN — Medication 10 ML: at 08:35

## 2022-05-10 RX ADMIN — INSULIN LISPRO 1 UNITS: 100 INJECTION, SOLUTION INTRAVENOUS; SUBCUTANEOUS at 08:36

## 2022-05-10 RX ADMIN — INSULIN LISPRO 1 UNITS: 100 INJECTION, SOLUTION INTRAVENOUS; SUBCUTANEOUS at 20:45

## 2022-05-10 RX ADMIN — TAMSULOSIN HYDROCHLORIDE 0.4 MG: 0.4 CAPSULE ORAL at 20:44

## 2022-05-10 RX ADMIN — Medication 10 ML: at 20:45

## 2022-05-10 RX ADMIN — ACETAMINOPHEN 650 MG: 325 TABLET ORAL at 14:10

## 2022-05-10 RX ADMIN — SODIUM BICARBONATE 1300 MG: 650 TABLET ORAL at 12:57

## 2022-05-10 RX ADMIN — SODIUM BICARBONATE 1300 MG: 650 TABLET ORAL at 08:34

## 2022-05-10 RX ADMIN — SODIUM BICARBONATE 1300 MG: 650 TABLET ORAL at 20:44

## 2022-05-10 RX ADMIN — ALLOPURINOL 100 MG: 100 TABLET ORAL at 08:34

## 2022-05-10 RX ADMIN — GENTAMICIN SULFATE: 1 CREAM TOPICAL at 08:34

## 2022-05-10 RX ADMIN — ASPIRIN 325 MG: 325 TABLET, COATED ORAL at 08:34

## 2022-05-10 ASSESSMENT — ENCOUNTER SYMPTOMS
GASTROINTESTINAL NEGATIVE: 1
SHORTNESS OF BREATH: 1
EYES NEGATIVE: 1

## 2022-05-10 ASSESSMENT — PAIN DESCRIPTION - LOCATION: LOCATION: FOOT

## 2022-05-10 ASSESSMENT — PAIN DESCRIPTION - ORIENTATION: ORIENTATION: OTHER (COMMENT)

## 2022-05-10 ASSESSMENT — PAIN SCALES - GENERAL: PAINLEVEL_OUTOF10: 3

## 2022-05-10 ASSESSMENT — PAIN DESCRIPTION - DESCRIPTORS: DESCRIPTORS: THROBBING

## 2022-05-10 NOTE — PROGRESS NOTES
PALLIATIVE MEDICINE PROGRESS NOTE     Patient name:Choco Choi    VZ DVQ:[id number]  Room/Bed:C7S-1848/5268-01    LOS: 4 days        ASSESSMENT/RECOMMENDATIONS     78 y.o. male with hypoxia and debility.         Symptom Management:  1. Hypoxia: Patient continues to be on 4 liters of oxygen via a nasal cannula for relief. 2. Debility: Patient was again lethargic today. Patient continues to require oxygen via a nasal cannula for dyspnea relief. Patient exhibited some generalized weakness today. Patient continues to require assistance with his ADL's.   3. Goals of Care:  Again met the patient at his bedside. Patient's daughter-in-law Alessandra Sor was also present for the visit today. Patient was lethargic and oriented x 4 today. Alessandra Sor indicated that the patient is pursing hospice services at this time. Hospice consult has been placed by Dr. Lige Cogan. Code status remains DNR-CC.      Patient/Family Goals of Care :    5/10 - Again met the patient at his bedside. Patient's daughter-in-law [employer] Sor was also present for the visit today. Patient was lethargic and oriented x 4 today. [employer] Sor indicated that the patient is pursing hospice services at this time. Hospice consult has been placed by Dr. Lige Cogan. Code status remains DNR-CC.  - Met patient at his bedside. Patient was lethargic but oriented x 4 today. Patient indicated that his primary medical contact should be his daughter-in-law Alessandra Spring who is  to his son Soy Garcia (the patient indicated Jihanravin Garcia is his POA but Alessandra Spring should be contacted first in order get a hold of Jeramy). I was able to speak with Alessandra Clementine and she indicated that she and Soy Garcia would work together on decision making for the patient when required. Alessandra Spring also indicated that she would be the first person to contact in order to get a hold of Jeramy for decision making.   Reviewed patients current health status, goals of care and code status with the patient and his daughter-in-law Eun Zhou (I speaking to Eun Zhou by phone during my visit today). The patient would like to continue with his current level aggressive therapy with hopes of being able to return home to be with his spouse Jace Schaumann. Eun Zhou indicated that the patient is scheduled to meet with [facility] this coming Monday to discuss treatment options/plan. Code status was reviewed and the patient would like to remain a Full Code at this time.       Disposition/Discharge Plan:   Pursuing hospice services.      Advance Directives:  · Surrogate Decision Maker: Per patient at previous visit, Magaly Brambila his son is POA but the first person to contact is Eun Andersonito (Jeramy's spouse) for decision making. Eun Zhou indicated it can be tough to reach Jeramy at times and that Eun Zhou would be able to assist in reaching Jeramy for decision making purposes if required. · Code status:  DNR-CC.     Case discussed with: patient, Eun Zhou (daughter-in-law)  Thank you for allowing us to participate in the care of this patient. SUBJECTIVE     Chief Complaint: Hypoxia. Last 24 hours:   Patient is pursuing hospice services at this time. Code status is DNR-CC.     ROS:    Review of Systems   Constitutional: Positive for fatigue. HENT: Negative. Eyes: Negative. Respiratory: Positive for shortness of breath. Cardiovascular: Negative. Gastrointestinal: Negative. Musculoskeletal: Negative. Skin: Negative. Neurological: Positive for weakness. Psychiatric/Behavioral: Negative for agitation, behavioral problems and confusion. All other systems reviewed and are negative. A complete 10 count ROS was obtained. Pertinent positives mentioned above in HPI/ROS. All others if not mentioned are negative. OBJECTIVE   BP (!) 93/57   Pulse 97   Temp 97.8 °F (36.6 °C) (Oral)   Resp 16   Ht 5' 11\" (1.803 m)   Wt 170 lb 13.7 oz (77.5 kg)   SpO2 94%   BMI 23.83 kg/m²   I/O last 3 completed shifts:   In: 450 [P.O.:450]  Out: 1201 [Other:200]  I/O this shift:  In: 180 [P.O.:180]  Out: 126       Physical Exam  Vitals reviewed. Constitutional:       Appearance: He is ill-appearing. Interventions: Nasal cannula in place. HENT:      Head: Normocephalic. Nose: Nose normal.   Eyes:      Pupils: Pupils are equal, round, and reactive to light. Cardiovascular:      Rate and Rhythm: Normal rate. Pulses: Normal pulses. Pulmonary:      Comments: Breath sounds again diminished bilaterally  Abdominal:      General: Bowel sounds are normal.      Palpations: Abdomen is soft. Musculoskeletal:      Cervical back: Neck supple. Right lower leg: Edema (trace) present. Left lower leg: Edema (trace) present. Skin:     General: Skin is warm and dry.    Neurological:      Comments: Oriented x4   Psychiatric:      Comments: lethargic        Total time: 36 minutes  >50% of time spent counseling patient at bedside or POA/family member if applicable , reviewing information and discussing care, coordinating with care team       Signed By: Electronically signed by YAZ Cahney CNP on 5/10/2022 at 1:12 PM   Palliative Medicine   [phone]    May 10, 2022

## 2022-05-10 NOTE — DISCHARGE INSTR - COC
Continuity of Care Form    Patient Name: Allyssa Hurd   :  1940  MRN:  [id number]    Admit date:  2022  Discharge date:      Code Status Order: [facility]   Advance Directives:   [place] Directive Type of Healthcare Directive Copy in [address] Agent's Name Healthcare Agent's Phone Number    [phone] No, patient does not have an advance directive for healthcare treatment -- -- -- -- --            Admitting Physician:  Rubi Rae DO  PCP: Morgan Mueller MD    Discharging Nurse: [facility] Unit/Room#: F8Z-2385/0645-19  Discharging Unit Phone Number: [phone]    Emergency Contact:   Extended Emergency Contact Information  Primary Emergency Contact: Brennan Lemos  Address:  to [place], family requests a call to Eun Winnie  in order to contact patient's son Mann for decision making. Mobile Phone: [phone]  Relation: Daughter-in-Law  Secondary Emergency Contact: Hari Rodriguez  Mobile Phone: [phone]  Relation: Child    Past Surgical History:  Past Surgical History:   Procedure Laterality Date    COLONOSCOPY      CT GUIDED CHEST TUBE  2022    CT GUIDED CHEST TUBE 2022 [facility] CT    JOINT REPLACEMENT         Immunization History: There is no immunization history for the selected administration types on file for this patient.     Active Problems:  Patient Active Problem List   Diagnosis Code    Anemia in stage 4 chronic kidney disease (HCC) N18.4, D63.1    Chronic kidney disease, stage IV (severe) (HCC) N18.4    Acute hypoxemic respiratory failure (HCC) J96.01    Pneumonia J18.9    Malignancy (HCC) C80.1    Sepsis (HCC) A41.9    Uremia N19    ESRD (end stage renal disease) ([facility] [address] N18.6    DM2 (diabetes mellitus, type 2) (HCC) E11.9    Severe malnutrition (HCC) E43       Isolation/Infection:   Isolation            No Isolation          Patient Infection Status Infection Onset Added Last Indicated Last Indicated By Review Planned Expiration Resolved Resolved By    None active    Resolved    COVID-19 (Rule Out) 05/06/22 05/06/22 05/06/22 COVID-19, Rapid (Ordered)   05/06/22 Rule-Out Test Resulted            Nurse Assessment:  Last Vital Signs: BP (!) 93/57   Pulse 97   Temp 97.8 °F (36.6 °C) (Oral)   Resp 16   Ht 5' 11\" (1.803 m)   Wt 170 lb 13.7 oz (77.5 kg)   SpO2 94%   BMI 23.83 kg/m²     Last documented pain score (0-10 scale): Pain Level: 3  Last Weight:   Wt Readings from Last 1 Encounters:   05/10/22 170 lb 13.7 oz (77.5 kg)     Mental Status:  disoriented, alert, and thought processes intact    IV Access:  - None    Nursing Mobility/ADLs:  Walking   Dependent  Transfer  Dependent  Bathing  Dependent  Dressing  Dependent  Toileting  Dependent  Feeding  Assisted  Med Admin  Dependent  Med Delivery   whole    Wound Care Documentation and Therapy:        Elimination:  Continence: Bowel: No  Bladder: No  Urinary Catheter: None   Colostomy/Ileostomy/Ileal Conduit: No       Date of Last BM: 5/9    Intake/Output Summary (Last 24 hours) at 5/10/2022 1435  Last data filed at 5/10/2022 1230  Gross per 24 hour   Intake 330 ml   Output 126 ml   Net 204 ml     I/O last 3 completed shifts: In: 450 [P.O.:450]  Out: 1201 [Other:200]    Safety Concerns: At Risk for Falls and Aspiration Risk    Impairments/Disabilities:      None    Nutrition Therapy:  Current Nutrition Therapy:   - Oral Diet:  General    Routes of Feeding: Oral  Liquids: Thin Liquids  Daily Fluid Restriction: no  Last Modified Barium Swallow with Video (Video Swallowing Test): not done    Treatments at the Time of Hospital Discharge:   Respiratory Treatments:   Oxygen Therapy:  is on oxygen at 4 L/min per nasal cannula. Ventilator:    - No ventilator support    Rehab Therapies:   Weight Bearing Status/Restrictions: Other Medical Equipment (for information only, NOT a DME order):     Other Treatments: Patient's personal belongings (please select all that are sent with patient):  None    RN SIGNATURE:  Electronically signed by Jose L Cruz RN on 5/10/22 at 6:37 PM EDT    CASE MANAGEMENT/SOCIAL WORK SECTION    Inpatient Status Date: 5/6/2022    Readmission Risk Assessment Score:  Readmission Risk              Risk of Unplanned Readmission:  24           Discharging to Facility/ Agency   Name: [facility]  Address:  Phone:[phone]  Fax:    Dialysis Facility (if applicable)   Name:  Address:  Dialysis Schedule:  Phone:  Fax:    / signature: Electronically signed by Le Butler RN Case Management on 5/10/2022 at 2:38 PM      PHYSICIAN SECTION    Prognosis: {Prognosis:3496926460}    Condition at Discharge: [address] [place] [address] Patient Condition:[id number]}    Rehab Potential (if transferring to Rehab): {Prognosis:2866307107}    Recommended Labs or Other Treatments After Discharge: ***    Physician Certification: I certify the above information and transfer of Phillip Ann  is necessary for the continuing treatment of the diagnosis listed and that he requires {Admit to Appropriate Level of Care:32498} for {GREATER/LESS:083721262} 30 days.      Update Admission H&P: {CHP DME Changes in IYWXE:704337685}    PHYSICIAN SIGNATURE:  {Esignature:[id number]}

## 2022-05-10 NOTE — PLAN OF CARE
Problem: Gastrointestinal - Adult  Goal: Maintains adequate nutritional intake  Outcome: Not Progressing     Problem: Discharge Planning  Goal: Discharge to home or other facility with appropriate resources  Outcome: Progressing  Flowsheets (Taken 5/9/2022 1954)  Discharge to home or other facility with appropriate resources:   Identify barriers to discharge with patient and caregiver   Arrange for needed discharge resources and transportation as appropriate   Identify discharge learning needs (meds, wound care, etc)     Problem: Skin/Tissue Integrity  Goal: Absence of new skin breakdown  Outcome: Progressing     Problem: Nutrition Deficit:  Goal: Optimize nutritional status  Outcome: Progressing  Flowsheets (Taken 5/10/2022 0218)  Nutrient intake appropriate for improving, restoring, or maintaining nutritional needs:   Assess nutritional status and recommend course of action   Monitor oral intake, labs, and treatment plans     Problem: Skin/Tissue Integrity - Adult  Goal: Skin integrity remains intact  Outcome: Progressing  Flowsheets (Taken 5/9/2022 1901 by Donnell Eaton RN)  Skin Integrity Remains Intact: Monitor for areas of redness and/or skin breakdown     Problem: Musculoskeletal - Adult  Goal: Return mobility to safest level of function  Outcome: Progressing  Flowsheets (Taken 5/9/2022 1954)  Return Mobility to Safest Level of Function:   Assess patient stability and activity tolerance for standing, transferring and ambulating with or without assistive devices   Assist with transfers and ambulation using safe patient handling equipment as needed     Problem: Musculoskeletal - Adult  Goal: Return ADL status to a safe level of function  Outcome: Progressing  Flowsheets (Taken 5/9/2022 1954)  Return ADL Status to a Safe Level of Function:   Administer medication as ordered   Assess activities of daily living deficits and provide assistive devices as needed   Assist and instruct patient to increase activity and self care as tolerated     Problem: Infection - Adult  Goal: Absence of infection at discharge  Outcome: Progressing  Flowsheets (Taken 5/9/2022 1954)  Absence of infection at discharge:   Assess and monitor for signs and symptoms of infection   Monitor lab/diagnostic results   Monitor all insertion sites i.e., indwelling lines, tubes and drains     Problem: Infection - Adult  Goal: Absence of infection during hospitalization  Outcome: Progressing  Flowsheets (Taken 5/9/2022 1954)  Absence of infection during hospitalization:   Assess and monitor for signs and symptoms of infection   Monitor lab/diagnostic results   Monitor all insertion sites i.e., indwelling lines, tubes and drains     Problem: Infection - Adult  Goal: Absence of infection during hospitalization  Outcome: Progressing  Flowsheets (Taken 5/9/2022 1954)  Absence of infection during hospitalization:   Assess and monitor for signs and symptoms of infection   Monitor lab/diagnostic results   Monitor all insertion sites i.e., indwelling lines, tubes and drains     Problem: Metabolic/Fluid and Electrolytes - Adult  Goal: Hemodynamic stability and optimal renal function maintained  Outcome: Progressing  Flowsheets (Taken 5/9/2022 1954)  Hemodynamic stability and optimal renal function maintained:   Monitor labs and assess for signs and symptoms of volume excess or deficit   Monitor intake, output and patient weight   Monitor response to interventions for patient's volume status, including labs, urine output, blood pressure (other measures as available)   Encourage oral intake as appropriate     Problem: Safety - Adult  Goal: Free from fall injury  Outcome: Adequate for Discharge  Flowsheets (Taken 5/9/2022 1859 by Michaela Adrian RN)  Free From Fall Injury: Instruct family/caregiver on patient safety     Problem: ABCDS Injury Assessment  Goal: Absence of physical injury  Outcome: Adequate for Discharge     Problem: Pain  Goal: Verbalizes/displays adequate comfort level or baseline comfort level  Outcome: Adequate for Discharge  Flowsheets (Taken 5/9/2022 1954)  Verbalizes/displays adequate comfort level or baseline comfort level:   Encourage patient to monitor pain and request assistance   Assess pain using appropriate pain scale     Problem: Respiratory - Adult  Goal: Achieves optimal ventilation and oxygenation  Outcome: Adequate for Discharge     Problem: Metabolic/Fluid and Electrolytes - Adult  Goal: Glucose maintained within prescribed range  Outcome: Adequate for Discharge  Flowsheets (Taken 5/9/2022 1954)  Glucose maintained within prescribed range:   Monitor blood glucose as ordered   Assess for signs and symptoms of hyperglycemia and hypoglycemia   Administer ordered medications to maintain glucose within target range   Assess barriers to adequate nutritional intake and initiate nutrition consult as needed

## 2022-05-10 NOTE — PROGRESS NOTES
Nutrition Note    NUTRITION THERAPY ASSESSMENT    Pt pursing hospice services,  patient will be followed at low nutrition risk. Dietitian will sign off. If nutrition intervention is required, please submit a dietary consult.     Electronically signed by Efrain Dunne RD, HELADIO on 5/10/22 at 1:30 PM EDT    Contact: [phone]

## 2022-05-10 NOTE — PROGRESS NOTES
[facility]  Phone: [phone]  Fax: [phone]  [email or web address]      Phoenix Burden is a 78 y.o. male whom we were asked to see for ESRD management. He was diagnosed with RUL lung cancer. He presents with AMS and worsening fatigue x 1 week. He has poor PO intake. He his dialysis adequacy has been low (fairly significantly). Interval History:  -No acute events overnight    HPI: Breathing comfortably. Asleep. Plan for hospice today. ROS:  In bed. No fever. [address]:  medications reviewed. Physical Exam:  Blood pressure (!) 93/57, pulse 97, temperature 97.8 °F (36.6 °C), temperature source Oral, resp. rate 16, height 5' 11\" (1.803 m), weight 170 lb 13.7 oz (77.5 kg), SpO2 94 %. Gen: NAD, Frail appearing  HEENT: Sclera anicteric, MMM  Neck: Supple. No JVD  CV: RRR, S1/S2  Pulm: CTAB/L  Abd: Soft, NT, ND, (+) PD catheter in LMQ  Ext: 1+ pitting edema in B/L LE  Neuro: Awake/Alert, Answers questions appropriately  Skin: Warm.  No significant visible rashes appreciated      Laboratory Studies:  Lab Results   Component Value Date/Time     05/10/2022 05:31 AM    K 3.6 05/10/2022 05:31 AM    K 4.3 05/06/2022 03:36 AM    CL 94 (L) 05/10/2022 05:31 AM    CO2 20 (L) 05/10/2022 05:31 AM    BUN 81 (HH) 05/10/2022 05:31 AM    CREATININE 11.7 (HH) 05/10/2022 05:31 AM    CALCIUM 7.4 (L) 05/10/2022 05:31 AM    PHOS 8.4 (H) 05/08/2022 06:23 AM    MG 2.40 05/10/2022 05:31 AM     Lab Results   Component Value Date/Time    WBC 9.3 05/10/2022 05:31 AM    HGB 7.6 (L) 05/10/2022 05:31 AM    HCT 22.8 (L) 05/10/2022 05:31 AM     05/10/2022 05:31 AM       Assessment/Plan:    1) ESRD on PD  -Dr. Abhinav Zambrano discussed with PDU and there were concerns for inadequate PD (vs. poor collection)  -PD regimen adjusted by Dr. Abhinav Zambrano  -BUN/Cr improving  -d/w patient's son and niece, and since he is going to hospice, we will be stopping dialysis, and they are in agreement (I just wanted to make this situation clear as I did not see that it was specifically addressed.)    2) AMS   - mildly confused with concerns for uremia from inadequate dialysis  -New PD Rx as above  -Management per Primary Team    3) metastatic lung cancer - per family, some details regarding specifics of diagnosis are unknown at this time - he has not met with [facility] in office yet  -failed PleurX catheter with loculated effusion  -Management per Primary Team    4) failure to thrive  -Management per Primary Team    5) FEN  - metabolic acidosis - improving with increase in sodium bicarb to 1300mg PO TID  - hyponatremia - improving with new PD regimen    6) Hypocalcemia:  -Vit D adequate  -Awaiting PTH level  -Corrected Ca++: 8.3  -Increase Phoslo as below    7) Hyperphosphatemia:  -Uncontrolled  -On Phoslo.  Increase to 3 capsules PO TID with meals    8) anemia  -Below goal and trending down  -Give Epo 20K units SQ x1 today  -s/p pRBC transfusion    9) ID  - on empiric flagyl ,cefepime, and vancomycin  - Management per Primary Team    [facility]

## 2022-05-10 NOTE — DISCHARGE SUMMARY
Hospital Medicine Discharge Summary    Patient: Jennifer Clement     Gender: male  : 1940   Age: 78 y.o. MRN: [id number]    Admitting Physician: Hazel Jaimes, DO  Discharge Physician: Peggy Warner MD     Code Status: DNR-CC     Admit Date: 2022   Discharge Date:   5/10/22    Disposition:  Home hospice    Discharge Diagnoses: Active Hospital Problems    Diagnosis Date Noted    Pneumonia [J18.9] 2022     Priority: Medium    Malignancy ([address] [C80.1] 2022     Priority: Medium    Sepsis ([address] [A41.9] 2022     Priority: Medium    Uremia [N19] 2022     Priority: Medium    ESRD (end stage renal disease) ([facility] [N18.6] 2022     Priority: Medium    DM2 (diabetes mellitus, type 2) ([facility] [E11.9] 2022     Priority: Medium    Severe malnutrition ([facility] [E43] 2022     Priority: Medium     AMS:  Wax and waining could be from uremia/ uremia    Recurrent right pleural Effusion  Acute hypoxemia  Metastatic Lung Cancer: Outpatient ongoing surveillance. Patient and family agreed to hospice consult. Okay to be discharged with home hospice tomorrow morning.     ESRD on PD     Follow-up appointments: None    Outpatient to do list: None    Condition at Discharge:  Terminal    Hospital Course:   Gennaro Martinez is a 78 y. o. male with a history of ESRD on peritoneal dialysis, hypertension, and was diagnosed with right upper lobe lung cancer. He had a pleural effusion that was drained with a chest tube 3 weeks ago. He is admitted for worsening altered mental status and fatigue for a week. He has no recollection of the recent admission but has been getting increasingly short of breath. Associated with chills and decreased appetite.  He has been consistent and always doing his peritoneal dialysis.  No chest pain, fever, chills, dizziness, syncope, dysuria, blood in urine/stool/sputum, rashes.  Patient had episodes of hypoxia and pleural effusions-repeated procedures were considered and pulmonology discussed with family who opted for quality of life approach and approved of discharge home with home hospice to keep the patient comfortable. Discharge Medications:   Current Discharge Medication List      START taking these medications    Details   LORazepam (ATIVAN) 0.5 MG tablet Take 1 tablet by mouth every 4 hours as needed for Anxiety for up to 30 days. Qty: 40 tablet, Refills: 0    Associated Diagnoses: Malignancy ([address]      Morphine Sulfate (MORPHINE 20MG/ML) 10 MG/0.5ML SOLN conventrated solution Take 0.125 mLs by mouth every 4 hours as needed for Pain for up to 3 days.   Qty: 30 mL, Refills: 0    Comments: Reduce doses taken as pain becomes manageable  Associated Diagnoses: Malignancy [facility])           Current Discharge Medication List        Current Discharge Medication List        Current Discharge Medication List      STOP taking these medications       loratadine (CLARITIN) 10 MG tablet Comments:   Reason for Stopping:         traMADol (ULTRAM) 50 MG tablet Comments:   Reason for Stopping:         metoprolol tartrate (LOPRESSOR) 25 MG tablet Comments:   Reason for Stopping:         calcium acetate 667 MG TABS Comments:   Reason for Stopping:         b complex-C-folic acid (NEPHROCAPS) 1 MG capsule Comments:   Reason for Stopping:         vitamin D (CHOLECALCIFEROL) 25 MCG (1000 UT) TABS tablet Comments:   Reason for Stopping:         doxepin (SINEQUAN) 10 MG capsule Comments:   Reason for Stopping:         aspirin 325 MG EC tablet Comments:   Reason for Stopping:         furosemide (LASIX) 40 MG tablet Comments:   Reason for Stopping:         tamsulosin (FLOMAX) 0.4 MG capsule Comments:   Reason for Stopping:         allopurinol (ZYLOPRIM) 100 MG tablet Comments:   Reason for Stopping:         linagliptin (TRADJENTA) 5 MG tablet Comments:   Reason for Stopping:               Discharge ROS:  A complete review of systems was asked and negative except for above    Discharge Exam:    BP (!) 93/57   Pulse 97   Temp 97.8 °F (36.6 °C) (Oral)   Resp 18   Ht 5' 11\" (1.803 m)   Wt 170 lb 13.7 oz (77.5 kg)   SpO2 97%   BMI 23.83 kg/m²   General appearance: No apparent distress, appears stated age and cooperative. Chronically ill looking. HEENT: Pupils equal, round, and reactive to light. Conjunctivae/corneas clear. Neck: Supple, with full range of motion. No jugular venous distention. Trachea midline. Respiratory:  Normal respiratory effort. Clear to auscultation, bilaterally without Rales/Wheezes/Rhonchi. Cardiovascular: Regular rate and rhythm with normal S1/S2 without murmurs, rubs or gallops. Abdomen: Soft, non-tender, non-distended with normal bowel sounds. Musculoskeletal: No clubbing, cyanosis or edema bilaterally. Full range of motion without deformity. Skin: Skin color, texture, turgor normal.  No rashes or lesions. Neurologic:  Neurovascularly intact without any focal sensory/motor deficits. Cranial nerves: II-XII intact, grossly non-focal.  Psychiatric: Alert and oriented, thought content appropriate, normal insight  Capillary Refill: Brisk,< 3 seconds   Peripheral Pulses: +2 palpable, equal bilaterally   Labs:  For convenience and continuity at follow-up the following most recent labs are provided:    Lab Results   Component Value Date    WBC 9.3 05/10/2022    HGB 7.6 05/10/2022    HCT 22.8 05/10/2022    .6 05/10/2022     05/10/2022     05/10/2022    K 3.6 05/10/2022    K 4.3 05/06/2022    CL 94 05/10/2022    CO2 20 05/10/2022    BUN 81 05/10/2022    CREATININE 11.7 05/10/2022    CALCIUM 7.4 05/10/2022    PHOS 8.4 05/08/2022    ALKPHOS 101 05/06/2022    ALT 10 05/06/2022    AST 27 05/06/2022    BILITOT <0.2 05/06/2022    BILIDIR <0.2 05/06/2022    LABALBU 2.1 05/06/2022     Lab Results   Component Value Date    INR 0.97 04/19/2022       Radiology:  Echo Complete    Result Date: 4/19/2022  Transthoracic Echocardiography Report (TTE)  Demographics   Patient Name       Adelbert Nissen   Date of Study      04/19/2022         Gender               Male   Patient Number     [id number]         Date of Birth        1940   Visit Number       [id number]          Age                  78 year(s)   Accession Number   [id number]         Room Number          9784   Corporate ID       [id number]           Mallory Lees   Ordering Physician Jocelyn Sams M.D. Physician            Tom Cortez MD  Procedure Type of Study   TTE procedure:ECHOCARDIOGRAM COMPLETE 2D W DOPPLER W COLOR. Procedure Date Date: 04/19/2022 Start: 02:02 PM Study Location: [facility] Echo Lab Technical Quality: Adequate visualization Indications:Atrial fibrillation. Additional Indications:HTN,DM,. Patient Status: Routine Height: 71 inches Weight: 162 pounds BSA: 1.93 m2 BMI: 22.59 kg/m2 HR: 101 bpm BP: 100/59 mmHg  Conclusions   Summary  Tachycardia present  Left ventricular cavity size is normal.  Ejection fraction is visually estimated to be 50-55%. Normal LVEF  Indeterminate diastolic function. Normal Aortic valve gradients  Mitral valve leaflets appear mildly thickened. Mild mitral annular calcification. RV is normal   Signature   ------------------------------------------------------------------  Electronically signed by Tom Cortez MD (Interpreting  physician) on 04/19/2022 at 04:32 PM  ------------------------------------------------------------------   Findings   Left Ventricle  Left ventricular cavity size is normal.  Ejection fraction is visually estimated to be 50-55%. There is increased left ventricular wall thickness. Unable to accurately assess wall motion due to arrythmia. Indeterminate diastolic function. Mitral Valve  Trivial mitral regurgitation. No evidence of mitral stenosis. Mitral valve leaflets appear mildly thickened.   Mild mitral annular calcification. Left Atrium  The left atrium is normal in size. Aortic Valve  The aortic valve appears normal in structure. The aortic valve is thickened/calcified but opens well with normal  gradients. Trivial aortic regurgitation. No evidence of aortic valve stenosis. Aorta  The aortic root is normal in size  Ascending aorta measures upper limits of normal   Right Ventricle  The right ventricle is normal in size and function. Tricuspid Valve  Tricuspid valve is structurally normal.  Trivial-mild tricuspid regurgitation. No evidence of tricuspid stenosis. Right Atrium  The right atrial size is normal.   Pulmonic Valve  The pulmonic valve is structurally normal.  No evidence of pulmonic valve stenosis. No evidence of pulmonic valve regurgitation. Pericardial Effusion  No pericardial effusion noted. Pleural Effusion  No pleural effusion. Miscellaneous  IVC size is normal (<2.1cm) and collapses > 50% with respiration consistent  with normal RA pressure (3mmHg). Estimated pulmonary artery systolic pressure is normal at 35 mmHg assuming a  right atrial pressure of 3 mmHg.   M-Mode/2D Measurements (cm)   LV Diastolic Dimension: 3.15 cm LV Systolic Dimension: 7.38 cm  LV Septum Diastolic: 6.48 cm  LV PW Diastolic: 1.99 cm        AO Root Dimension: 3.6 cm                                   LA Area: 14.6 cm2  LVOT: 2.2 cm                    LA volume/Index: 46 ml /24 ml/m2  Doppler Measurements   AV Peak Velocity: 113 cm/s     MV Peak E-Wave: 69.9 cm/s  AV Peak Gradient: 5.11 mmHg    MV Peak A-Wave: 29.8 cm/s  LVOT Peak Velocity: 82.5 cm/s  MV E/A Ratio: 2.35                                 MV Mean Gradient: 1 mmHg  TR Velocity:285 cm/s           MV Max P mmHg  TR Gradient:32.49 mmHg         MV Vmax:63.8 cm/s  Estimated RAP:3 mmHg           MV VTI:17.5 cm/s  Estimated RVSP: 35 mmHg  E' Septal Velocity: 7.72 cm/s  MV Area (continuity): 2.97 cm2  E' Lateral Velocity: 6.85 cm/s MV Deceleration Time: 187 msec  PV Peak Velocity: 96.5 cm/s  PV Peak Gradient: 3.72 mmHg   Aortic Valve   Peak Velocity: 113 cm/s  Peak Gradient: 5.11 mmHg  Aorta   Aortic Root: 3.6 cm  Ascending Aorta: 3.7 cm  LVOT Diameter: 2.2 cm      XR CHEST (2 VW)    Result Date: 4/11/2022  EXAMINATION: TWO XRAY VIEWS OF THE CHEST 4/11/2022 1:01 pm COMPARISON: 09/01/2021 HISTORY: ORDERING SYSTEM PROVIDED HISTORY: Dyspnea on exertion TECHNOLOGIST PROVIDED HISTORY: Reason for Exam: Dyspnea on exertion FINDINGS: There has been interval development of soft tissue fullness in the right hilum along with a small right pleural effusion. Segmental airspace disease is present within the right lower and middle lobes. The left lung is clear. Heart size and vascularity are stable. Right hilar soft tissue fullness with increasing right parapneumonic effusion. CT of the chest recommended to evaluate for underlying mass. CT HEAD WO CONTRAST    Result Date: 5/6/2022  EXAMINATION: CT OF THE HEAD WITHOUT CONTRAST  5/6/2022 1:09 am TECHNIQUE: CT of the head was performed without the administration of intravenous contrast. Dose modulation, iterative reconstruction, and/or weight based adjustment of the mA/kV was utilized to reduce the radiation dose to as low as reasonably achievable. COMPARISON: None. HISTORY: ORDERING SYSTEM PROVIDED HISTORY: AMS TECHNOLOGIST PROVIDED HISTORY: Has a \"code stroke\" or \"stroke alert\" been called? ->No Reason for exam:->AMS Decision Support Exception - unselect if not a suspected or confirmed emergency medical condition->Emergency Medical Condition (MA) Reason for Exam: Fatigue; Extremity Weakness FINDINGS: BRAIN/VENTRICLES: There is no acute intracranial hemorrhage, mass effect or midline shift. No abnormal extra-axial fluid collection. The gray-white differentiation is maintained without evidence of an acute infarct. There is prominence of the ventricles and sulci due to global parenchymal volume loss.  There are nonspecific areas of hypoattenuation within the periventricular and subcortical white matter, which likely represent chronic microvascular ischemic change. ORBITS: The visualized portion of the orbits demonstrate no acute abnormality. SINUSES: The visualized paranasal sinuses and mastoid air cells demonstrate no acute abnormality. SOFT TISSUES/SKULL: No acute abnormality of the visualized skull or soft tissues. No acute intracranial abnormality. CT CHEST WO CONTRAST    Result Date: 4/18/2022  EXAMINATION: CT OF THE CHEST WITHOUT CONTRAST 4/18/2022 12:12 pm TECHNIQUE: CT of the chest was performed without the administration of intravenous contrast. Multiplanar reformatted images are provided for review. Dose modulation, iterative reconstruction, and/or weight based adjustment of the mA/kV was utilized to reduce the radiation dose to as low as reasonably achievable. COMPARISON: Recent x-ray HISTORY: ORDERING SYSTEM PROVIDED HISTORY: pleural effusion, new right infiltrate vs mass? TECHNOLOGIST PROVIDED HISTORY: Reason for exam:->pleural effusion, new right infiltrate vs mass? Decision Support Exception - unselect if not a suspected or confirmed emergency medical condition->Emergency Medical Condition (MA) Reason for Exam: pleural effusion, new right infiltrate vs mass FINDINGS: Mediastinum: Thyroid gland is unremarkable. There is abnormal mediastinal-hilar adenopathy. An index right lower paratracheal-subcarinal mass measures 3.5 cm x 3.3 cm. Some of the lymph nodes contain calcification internally. Atherosclerotic change seen in aorta. Aortic annulus calcification is seen. Severe coronary artery calcification is seen. Lungs/pleura: No focal consolidation is seen on the left. No obstructing endobronchial lesions are seen on the left On the right, moderate to large right-sided pleural effusion is seen. There is adjacent consolidation at the right lung base.   There is subtle nodularity suggested along the pleura posteriorly on the right. Heterogeneous masslike consolidation is seen in the superomedial right upper lobe, as well as the right middle lobe. A few air bronchograms are also seen. Upper Abdomen: Right adrenal gland is normal.  Left adrenal gland is normal. Punctate vascular calcifications are seen in the left kidney Soft Tissues/Bones: Spurring is seen in the spine. Spurring is seen in the shoulder joints. Heterogeneous consolidative change is seen in the right upper lobe and right middle lobe, which could be due to underlying lung carcinoma given the marked abnormal mediastinal adenopathy. Heterogeneous areas of nodularity along the pleura posteriorly on the right raise the question of concomitant pleural implants. RECOMMENDATIONS: Unavailable     CT GUIDED NEEDLE PLACEMENT    Result Date: 4/19/2022  PROCEDURE: CT GUIDED CHEST TUBE PLACEMENT MODERATE CONSCIOUS SEDATION 4/19/2022 HISTORY: ORDERING SYSTEM PROVIDED HISTORY: pleural effusion TECHNOLOGIST PROVIDED HISTORY: Reason for exam:->pleural effusion Which side should the procedure be performed?->Right Reason for Exam: pleural effusion Pneumothorax SEDATION: No sedation was used. TECHNIQUE: Informed consent was obtained after a detailed explanation of the procedure including risks, benefits, and alternatives. Universal protocol was followed. A suitable skin site was prepped and draped in sterile fashion following CT localization. An 18 gauge needle was advanced into the right pleural space and a 0.035 guidewire was used to place a 10 Nepalese chest tube after the fascial tract was dilated. The catheter was sutured to the skin and the patient tolerated the procedure well. The catheter was attached to Pleurevac drainage. Estimated blood loss: Less than 5 cc Dose modulation, iterative reconstruction, and/or weight based adjustment of the mA/kV was utilized to reduce the radiation dose to as low as reasonably achievable.  DLP: 317.06 mGy-cm FINDINGS: Post procedure images demonstrate the chest tube in good position     Successful CT guided placement of a 10 Mongolian right chest tube     XR CHEST PORTABLE    Result Date: 5/9/2022  EXAMINATION: ONE XRAY VIEW OF THE CHEST 5/9/2022 9:07 am COMPARISON: 05/06/2022 HISTORY: ORDERING SYSTEM PROVIDED HISTORY: post thoracentesis TECHNOLOGIST PROVIDED HISTORY: Reason for exam:->post thoracentesis Reason for Exam: post thora FINDINGS: Overall similar right-sided pleural effusion and airspace opacities. No pneumothorax. The left lung is clear. No gross bony abnormality. Cardiomediastinal silhouette is stable. Overall similar right-sided pleural effusion and airspace opacities. No pneumothorax. XR CHEST PORTABLE    Result Date: 4/21/2022  EXAMINATION: ONE XRAY VIEW OF THE CHEST 4/21/2022 10:42 am COMPARISON: 04/20/2022 HISTORY: ORDERING SYSTEM PROVIDED HISTORY: pleural effusion TECHNOLOGIST PROVIDED HISTORY: Reason for exam:->pleural effusion Reason for Exam: SOB FINDINGS: Heart size is normal.  Mediastinal contours are normal.  Pulmonary vascularity is normal.  Atherosclerotic change seen in aorta. Small right apical pneumothorax is seen, similar to prior. Distance from chest wall to pleural margin measures 9 mm Focal mass right upper lobe appears similar. Right-sided chest tube no longer noted. There is increased right-sided pleural effusion No new findings noted on the left. Skin folds are seen inferiorly in the left chest wall     Increased right-sided pleural effusion. Right-sided chest tube no longer noted. A right apical pneumothorax is unchanged.   No change in right upper lobe mass     XR CHEST PORTABLE    Result Date: 4/20/2022  EXAMINATION: ONE XRAY VIEW OF THE CHEST 4/20/2022 2:36 pm COMPARISON: Radiograph taken earlier today HISTORY: ORDERING SYSTEM PROVIDED HISTORY: hypoxemia TECHNOLOGIST PROVIDED HISTORY: Reason for exam:->hypoxemia Reason for Exam: on room air FINDINGS: Unchanged right apical pneumothorax measuring 13 mm. Right pleural catheter noted inferiorly. Large right perihilar masslike consolidation is unchanged. .  Left hilar prominence is unchanged. Ground-glass haziness in the right lung. Normal cardiac size. Significant osteopenic changes and degenerative changes noted in the bony structures. No significant change. Small right apical pneumothorax. Large right perihilar masslike consolidation is unchanged. Ground-glass haziness right lung is unchanged     XR CHEST PORTABLE    Result Date: 4/20/2022  EXAMINATION: ONE XRAY VIEW OF THE CHEST 4/20/2022 10:01 am COMPARISON: CTA PA, 04/18/2022 HISTORY: ORDERING SYSTEM PROVIDED HISTORY: efffusion TECHNOLOGIST PROVIDED HISTORY: Reason for exam:->efffusion Reason for Exam: right chest tube FINDINGS: There is a right basilar chest drain. The cardiac silhouette is stable. Mass-like consolidation in the right perihilar region is again noted. There is additional peripheral airspace disease in ground-glass opacity in the right lung. There is a small right apical pneumothorax, measuring 13 mm. Left lung is clear. Right basilar chest drain is noted. New, small right pneumothorax. Right pleural effusion is decreased. Right perihilar masslike consolidation, neoplasm versus round pneumonia. Additional alveolar and ground-glass opacities in the right lung, pulmonary edema and atelectasis favored over pneumonia. XR CHEST PORTABLE    Result Date: 4/18/2022  EXAMINATION: ONE XRAY VIEW OF THE CHEST 4/18/2022 11:17 am COMPARISON: 04/11/2022. HISTORY: ORDERING SYSTEM PROVIDED HISTORY: orthopnea TECHNOLOGIST PROVIDED HISTORY: Reason for exam:->orthopnea Reason for Exam: orthopnea FINDINGS: Moderate to large right pleural effusion, increased compared to the previous study. There is associated right basilar volume loss. Increased right perihilar opacity. The left lung is clear with no left pleural effusion. The cardiac silhouette is grossly stable. Dense aortic vascular calcification. Increased moderate to large right pleural effusion with basilar volume loss. Increased right perihilar opacification, likely infiltrate although underlying mass is not excluded. Close imaging surveillance with consideration of follow-up chest CT is recommended. CT CHEST PULMONARY EMBOLISM W CONTRAST    Result Date: 4/18/2022  EXAMINATION: CTA OF THE CHEST 4/18/2022 2:04 pm TECHNIQUE: CTA of the chest was performed after the administration of intravenous contrast.  Multiplanar reformatted images are provided for review. MIP images are provided for review. Dose modulation, iterative reconstruction, and/or weight based adjustment of the mA/kV was utilized to reduce the radiation dose to as low as reasonably achievable. COMPARISON: CT chest earlier today. HISTORY: ORDERING SYSTEM PROVIDED HISTORY: ESRD on dialysis, new orthopnea, probable lung cancer on ct wo, r/o pe TECHNOLOGIST PROVIDED HISTORY: Reason for exam:->ESRD on dialysis, new orthopnea, probable lung cancer on ct wo, r/o pe Decision Support Exception - unselect if not a suspected or confirmed emergency medical condition->Emergency Medical Condition (MA) Reason for Exam: ESRD on dialysis, new orthopnea, probable lung cancer on ct wo, r/o pe FINDINGS: Pulmonary Arteries: Pulmonary arteries are adequately opacified for evaluation. No evidence of intraluminal filling defect to suggest pulmonary embolism. Enlarged main pulmonary artery at 3.7 cm. Mediastinum: The thoracic aorta is normal in caliber with calcified atherosclerotic plaque. The heart is mildly enlarged with coronary vascular calcification and no pericardial effusion. The esophagus is unremarkable. There is redemonstration of mediastinal adenopathy as described earlier. The largest right paratracheal/precarinal node measures 2.6 x 3.9 cm. Lungs/pleura:  Moderate to large right pleural effusion with extensive atelectasis in the basal portions of the right lower lobe and right middle lobe. The effusion is mildly complex in attenuation with questionable soft tissue attenuation particularly in the upper portions of the fluid. Consolidative opacity in the right upper lobe anteriorly is again noted. There is patchy interstitial changes throughout the aerated right lung. The left lung is clear. No left pleural effusion. The central airways are patent. Upper Abdomen: Small quantity of upper abdominal ascites. The visualized upper abdominal viscera are unremarkable. Soft Tissues/Bones: No acute osseous or soft tissue abnormality. Prominent multilevel degenerative changes in the lower cervical spine with mild degenerative change throughout the thoracic spine. 1. No evidence of pulmonary embolic disease. 2. Redemonstration of dense right upper lobe consolidation anteriorly. Pathologic mediastinal adenopathy. The findings are concerning for underlying malignancy. Consider pulmonary consultation with possible sampling. 3. Complex right pleural effusion concerning for malignant effusion. Consider sampling. CT GUIDED CHEST TUBE    Result Date: 4/19/2022  PROCEDURE: CT GUIDED CHEST TUBE PLACEMENT MODERATE CONSCIOUS SEDATION 4/19/2022 HISTORY: ORDERING SYSTEM PROVIDED HISTORY: pleural effusion TECHNOLOGIST PROVIDED HISTORY: Reason for exam:->pleural effusion Which side should the procedure be performed?->Right Reason for Exam: pleural effusion Pneumothorax SEDATION: No sedation was used. TECHNIQUE: Informed consent was obtained after a detailed explanation of the procedure including risks, benefits, and alternatives. Universal protocol was followed. A suitable skin site was prepped and draped in sterile fashion following CT localization. An 18 gauge needle was advanced into the right pleural space and a 0.035 guidewire was used to place a 10 Icelandic chest tube after the fascial tract was dilated.   The catheter was sutured to the skin and the patient tolerated the procedure well. The catheter was attached to Pleurevac drainage. Estimated blood loss: Less than 5 cc Dose modulation, iterative reconstruction, and/or weight based adjustment of the mA/kV was utilized to reduce the radiation dose to as low as reasonably achievable. DLP: 881.23 mGy-cm FINDINGS: Post procedure images demonstrate the chest tube in good position     Successful CT guided placement of a 10 Italian right chest tube     US THORACENTESIS Which side should the procedure be performed? Right    Result Date: 4/19/2022  PROCEDURE: ULTRASOUNDGUIDED RIGHT THORACENTESIS 4/19/2022 HISTORY: ORDERING SYSTEM PROVIDED HISTORY: pleural effusion TECHNOLOGIST PROVIDED HISTORY: Reason for exam:->pleural effusion Which side should the procedure be performed?->Right TECHNIQUE: Informed consent was obtained after a detailed explanation of the procedure including risks, benefits, and alternatives. Universal protocol was performed. The right chest was prepped and draped in sterile fashion and local anesthesia was achieved with lidocaine. A 5 Italian needle sheath was advanced under ultrasound guidance into pleural effusion and thoracentesis was performed. The patient tolerated the procedure Estimated blood loss: Less than 5 cc FINDINGS: A total of 800 cc dark red fluid was removed and sent for analysis. Successful ultrasound guided right thoracentesis. CT CHEST ABDOMEN PELVIS WO CONTRAST    Result Date: 5/7/2022  EXAMINATION: CT OF THE CHEST, ABDOMEN, AND PELVIS WITHOUT CONTRAST 5/6/2022 1:09 am TECHNIQUE: CT of the chest, abdomen and pelvis was performed without the administration of intravenous contrast. Multiplanar reformatted images are provided for review. Dose modulation, iterative reconstruction, and/or weight based adjustment of the mA/kV was utilized to reduce the radiation dose to as low as reasonably achievable.  COMPARISON: CT chest on 04/18/2022, abdomen and pelvis on 04/13/2016 HISTORY: [address] PROVIDED HISTORY: Weakness TECHNOLOGIST PROVIDED HISTORY: Reason for exam:->Weakness Additional Contrast?->None Decision Support Exception - unselect if not a suspected or confirmed emergency medical condition->Emergency Medical Condition (MA) Reason for Exam: Fatigue; Extremity Weakness FINDINGS: Chest: Mediastinum: Aortic atherosclerotic disease is identified. No thoracic aortic aneurysm. Coronary artery atherosclerosis. No significant pericardial effusion. No focal esophageal thickening. Similar appearing pathologically enlarged mediastinal lymph nodes are identified as demonstrated on a recent comparison CT. This includes a 19 mm short axis lymph node which is right paratracheal, axial image 22. There is a larger pretracheal lymph node measuring 19 x 25 mm, also unchanged. Main pulmonary artery enlargement suggestive of pulmonary hypertension, measuring approximately 4 cm. Lungs/pleura: There is a loculated appearing right pleural effusion, which has decreased in size since the previous examination. Again identified is the dense masslike consolidation seen in the right upper lobe, with subtle area of calcific density seen on and around axial image 42, with subtle calcifications seen in the metastatic appearing lymph nodes as well suggesting underlying right upper lobe neoplasm. There is a probable metastatic pulmonary nodule seen within the right lower lobe measuring 17 mm in size. Patchy reticulonodular markings seen within the right lung, with areas of patchy consolidation. Small foci of gas noted within the right-sided pleural effusion which was likely related to recent chest tube. Soft Tissues/Bones: No axillary lymphadenopathy is identified. No supraclavicular lymphadenopathy is identified. The thyroid gland appears unremarkable. Diffuse osteopenia. Multilevel degenerative changes are seen within the spine. No fracture or dislocation is identified.   No osseous destructive process is identified. Abdomen/Pelvis: Organs: No hypodense mass identified within the liver. Splenic calcified granulomas. No adrenal mass. Cholelithiasis is identified with prominent gallbladder wall likely related to gallbladder contraction. Renal calculi seen bilaterally, which may represent vascular calcifications. No obstruction is identified. No obstructive ureteral calculi. GI/Bowel: There are some jejunal loops seen in the left upper quadrant with mild prominence of the mucosal folds, with some adjacent interloop fluid. Pelvis: No pelvic mass. The bladder appears unremarkable. No significant prostate enlargement. No bulky pelvic lymphadenopathy is identified. A peritoneal dialysis catheter is seen with small volume ascites. Peritoneum/Retroperitoneum: No aortic aneurysm. Diffuse atherosclerosis. Small volume upper abdominal ascites. No bulky retroperitoneal periaortic lymphadenopathy. No gastrohepatic or significant periportal lymphadenopathy. Bones/Soft Tissues: No acute subcutaneous soft tissue abnormality. No acute osseous fracture or osseous destructive process. Multilevel degenerative changes are seen in the spine. No acute lumbar spine fracture is identified. Spinal canal narrowing seen secondary to disc bulges at L2-L3 and L4-L5. 1. Large area of right upper lobe dense consolidation with subtle areas of calcific density seen which may represent a central right upper lobe neoplasm and postobstructive consolidation. 2. Similar appearing pathologic mediastinal lymphadenopathy with subtle calcification noted as well. 3. Small right-sided pleural effusion status post partial evacuation from recent chest tube placement with a small amount of gas remaining. Loculated appearing pleural effusion remains. 4. Patchy areas of consolidation and reticulonodular change seen within the right lung suspicious for multifocal pneumonia.  5. There is a 17 mm pulmonary nodule in the right lower lobe concerning for possible metastatic disease. No left-sided pulmonary nodules are identified. 6. Prominent mucosal folds seen within a few loops of jejunum in the left abdomen with some interloop fluid, which can be seen in the setting of enteritis though the interloop fluid may be secondary to the indwelling peritoneal dialysis catheter and small volume upper abdominal and pelvic ascites. 7. Cholelithiasis without findings to suggest cholecystitis. 8. No definite metastatic disease seen in the abdomen or pelvis. IR GUIDED THORACENTESIS PLEURAL    Result Date: 5/9/2022  PROCEDURE: ULTRASOUNDGUIDED RIGHT THORACENTESIS 5/9/2022 HISTORY: ORDERING SYSTEM PROVIDED HISTORY: pleur-x cath TECHNOLOGIST PROVIDED HISTORY: Reason for exam:->pleur-x cath Which side should the procedure be performed?->Right TECHNIQUE: Informed consent was obtained after a detailed explanation of the procedure including risks, benefits, and alternatives. Universal protocol was performed. The right chest was prepped and draped in sterile fashion and local anesthesia was achieved with lidocaine. A 5 [place] needle sheath was advanced under ultrasound guidance into pleural effusion and thoracentesis was performed. The patient tolerated the procedure Estimated blood loss: Less than 5 cc FINDINGS: A total of 200 cc bloody fluid was removed. Successful ultrasound guided right thoracentesis. EKG     Rhythm: normal sinus   Rate: normal  Clinical Impression: no acute changes        The patient was seen and examined on day of discharge and this discharge summary is in conjunction with any daily progress note from day of discharge. Time Spent on discharge is 30 minutes  in the examination, evaluation, counseling and review of medications and discharge plan.       Note that more than 30 minutes was spent in preparing discharge papers, discussing discharge with patient, medication review, etc.       Signed:    Kirk Mcclellan MD   5/10/2022      Thank you Beatriz Campbell MD for the opportunity to be involved in this patient's care.  If you have any questions or concerns please feel free to contact me at [facility]

## 2022-05-10 NOTE — CARE COORDINATION
Plan for discharge home with [facility] today. Await plans to be finalized.   Electronically signed by Le Butler RN Case Management [phone] on 5/10/2022 at 2:34 PM

## 2022-05-10 NOTE — PLAN OF CARE
Problem: Discharge Planning  Goal: Discharge to home or other facility with appropriate resources  5/10/2022 0739 by Sarah Ruby RN  Outcome: Progressing     Problem: Skin/Tissue Integrity  Goal: Absence of new skin breakdown  Description: 1. Monitor for areas of redness and/or skin breakdown  2. Assess vascular access sites hourly  3. Every 4-6 hours minimum:  Change oxygen saturation probe site  4. Every 4-6 hours:  If on nasal continuous positive airway pressure, respiratory therapy assess nares and determine need for appliance change or resting period.   5/10/2022 0739 by Sarah Ruby RN  Outcome: Progressing     Problem: Safety - Adult  Goal: Free from fall injury  5/10/2022 0739 by Sarah Ruby RN  Outcome: Progressing     Problem: ABCDS Injury Assessment  Goal: Absence of physical injury  5/10/2022 0739 by Sarah Ruby RN  Outcome: Progressing     Problem: Pain  Goal: Verbalizes/displays adequate comfort level or baseline comfort level  5/10/2022 0739 by Sarah Ruby RN  Outcome: Progressing     Problem: Nutrition Deficit:  Goal: Optimize nutritional status  5/10/2022 0739 by Sarah Ruby RN  Outcome: Progressing     Problem: Respiratory - Adult  Goal: Achieves optimal ventilation and oxygenation  5/10/2022 0739 by Sarah Ruby RN  Outcome: Progressing     Problem: Cardiovascular - Adult  Goal: Maintains optimal cardiac output and hemodynamic stability  5/10/2022 0739 by Sarah Ruby RN  Outcome: Progressing     Problem: Skin/Tissue Integrity - Adult  Goal: Skin integrity remains intact  5/10/2022 0739 by Sarah Ruby RN  Outcome: Progressing

## 2022-05-11 VITALS
WEIGHT: 167.55 LBS | OXYGEN SATURATION: 94 % | RESPIRATION RATE: 22 BRPM | HEART RATE: 115 BPM | SYSTOLIC BLOOD PRESSURE: 113 MMHG | BODY MASS INDEX: 23.46 KG/M2 | TEMPERATURE: 98 F | DIASTOLIC BLOOD PRESSURE: 64 MMHG | HEIGHT: 71 IN

## 2022-05-11 LAB
ANION GAP SERPL CALCULATED.3IONS-SCNC: 23 MMOL/L (ref 3–16)
BASOPHILS ABSOLUTE: 0 K/UL (ref 0–0.2)
BASOPHILS RELATIVE PERCENT: 0.4 %
BUN BLDV-MCNC: 107 MG/DL (ref 7–20)
CALCIUM SERPL-MCNC: 7.5 MG/DL (ref 8.3–10.6)
CHLORIDE BLD-SCNC: 93 MMOL/L (ref 99–110)
CO2: 21 MMOL/L (ref 21–32)
CREAT SERPL-MCNC: 13.2 MG/DL (ref 0.8–1.3)
EOSINOPHILS ABSOLUTE: 0.1 K/UL (ref 0–0.6)
EOSINOPHILS RELATIVE PERCENT: 0.9 %
GFR AFRICAN AMERICAN: 4
GFR NON-AFRICAN AMERICAN: 4
GLUCOSE BLD-MCNC: 158 MG/DL (ref 70–99)
GLUCOSE BLD-MCNC: 170 MG/DL (ref 70–99)
HCT VFR BLD CALC: 21.3 % (ref 40.5–52.5)
HEMOGLOBIN: 6.9 G/DL (ref 13.5–17.5)
LYMPHOCYTES ABSOLUTE: 0.5 K/UL (ref 1–5.1)
LYMPHOCYTES RELATIVE PERCENT: 5.4 %
MAGNESIUM: 2.3 MG/DL (ref 1.8–2.4)
MCH RBC QN AUTO: 33.3 PG (ref 26–34)
MCHC RBC AUTO-ENTMCNC: 32.6 G/DL (ref 31–36)
MCV RBC AUTO: 102.4 FL (ref 80–100)
MONOCYTES ABSOLUTE: 0.9 K/UL (ref 0–1.3)
MONOCYTES RELATIVE PERCENT: 10.9 %
NEUTROPHILS ABSOLUTE: 7.1 K/UL (ref 1.7–7.7)
NEUTROPHILS RELATIVE PERCENT: 82.4 %
PDW BLD-RTO: 19.9 % (ref 12.4–15.4)
PERFORMED ON: ABNORMAL
PLATELET # BLD: 322 K/UL (ref 135–450)
PMV BLD AUTO: 7.6 FL (ref 5–10.5)
POTASSIUM SERPL-SCNC: 3.6 MMOL/L (ref 3.5–5.1)
RBC # BLD: 2.08 M/UL (ref 4.2–5.9)
SODIUM BLD-SCNC: 137 MMOL/L (ref 136–145)
WBC # BLD: 8.6 K/UL (ref 4–11)

## 2022-05-11 PROCEDURE — 94760 N-INVAS EAR/PLS OXIMETRY 1: CPT

## 2022-05-11 PROCEDURE — 6370000000 HC RX 637 (ALT 250 FOR IP): Performed by: STUDENT IN AN ORGANIZED HEALTH CARE EDUCATION/TRAINING PROGRAM

## 2022-05-11 PROCEDURE — 2580000003 HC RX 258: Performed by: STUDENT IN AN ORGANIZED HEALTH CARE EDUCATION/TRAINING PROGRAM

## 2022-05-11 PROCEDURE — 36415 COLL VENOUS BLD VENIPUNCTURE: CPT

## 2022-05-11 PROCEDURE — 83735 ASSAY OF MAGNESIUM: CPT

## 2022-05-11 PROCEDURE — 80048 BASIC METABOLIC PNL TOTAL CA: CPT

## 2022-05-11 PROCEDURE — 85025 COMPLETE CBC W/AUTO DIFF WBC: CPT

## 2022-05-11 PROCEDURE — 2700000000 HC OXYGEN THERAPY PER DAY

## 2022-05-11 RX ADMIN — INSULIN LISPRO 1 UNITS: 100 INJECTION, SOLUTION INTRAVENOUS; SUBCUTANEOUS at 07:55

## 2022-05-11 RX ADMIN — GENTAMICIN SULFATE: 1 CREAM TOPICAL at 07:55

## 2022-05-11 RX ADMIN — Medication 10 ML: at 07:55

## 2022-05-11 NOTE — CARE COORDINATION
CASE MANAGEMENT DISCHARGE SUMMARY:    DISCHARGE DATE: 5/11/22    DISCHARGED TO: Home with [facility]     TRANSPORTATION: Medical Transport              TIME: 9:00 AM     KISHAN Solis, DAMIANW, Social Work/Case Management   [phone]  Electronically signed by KISHAN Solis, DAMIANW on 5/11/2022 at 9:13 AM

## 2022-05-11 NOTE — PROGRESS NOTES
Discharge orders acknowledged by RN . Discharge teaching completed with family. AVS reviewed and all questions answered. Medication regimen reviewed and family understands schedule. Resources given for discharge. Family sent with paper scripts to be filled and understands schedule. IV removed. Bedside monitor removed from pt. Required core measures completed. Pt vitals WDL. Pt discharged with all belongings to home with [facility] transport. Pt transported off of unit via wheelchair. No complications.      Electronically signed by Mitesh Collier RN on 5/11/2022 at 9:37 AM

## 2022-05-12 LAB
BODY FLUID CULTURE, STERILE: NORMAL
GRAM STAIN RESULT: NORMAL